# Patient Record
Sex: MALE | Race: WHITE | Employment: FULL TIME | URBAN - METROPOLITAN AREA
[De-identification: names, ages, dates, MRNs, and addresses within clinical notes are randomized per-mention and may not be internally consistent; named-entity substitution may affect disease eponyms.]

---

## 2017-05-05 ENCOUNTER — EMPLOYEE WELLNESS (OUTPATIENT)
Dept: OTHER | Age: 47
End: 2017-05-05

## 2017-05-05 LAB
CHOLESTEROL, TOTAL: 152 MG/DL (ref 0–199)
GLUCOSE BLD-MCNC: 94 MG/DL (ref 74–109)
HDLC SERPL-MCNC: 37 MG/DL (ref 40–59)
LDL CHOLESTEROL CALCULATED: 86 MG/DL (ref 0–129)
TRIGL SERPL-MCNC: 144 MG/DL (ref 0–200)

## 2017-10-16 RX ORDER — LEVOTHYROXINE SODIUM 0.15 MG/1
150 TABLET ORAL DAILY
Qty: 30 TABLET | Refills: 1 | Status: SHIPPED | OUTPATIENT
Start: 2017-10-16 | End: 2017-12-18 | Stop reason: SDUPTHER

## 2017-10-16 NOTE — TELEPHONE ENCOUNTER
Pt is calling because he needs to request a rx refill for his Levothyroxine 150 mcg (pending).  He isn't due for his physical until December but in the meantime, needs his Levothyroxine filled    Preferred pharmacy, [facility]

## 2017-12-18 NOTE — TELEPHONE ENCOUNTER
From: Ted Golden  Sent: 12/18/2017 11:53 AM EST  Subject: Medication Renewal Request    Ted Golden would like a refill of the following medications:  levothyroxine (LEVOTHROID) 150 MCG tablet Alice Tavarez MD]    Preferred pharmacy: [address] [place] [address] [phone] - F [phone]    Comment:  I need a refill until I can schedule my physical later this month.

## 2017-12-20 ENCOUNTER — OFFICE VISIT (OUTPATIENT)
Dept: FAMILY MEDICINE CLINIC | Age: 47
End: 2017-12-20

## 2017-12-20 VITALS
DIASTOLIC BLOOD PRESSURE: 82 MMHG | SYSTOLIC BLOOD PRESSURE: 124 MMHG | TEMPERATURE: 97.3 F | WEIGHT: 195 LBS | HEIGHT: 73 IN | HEART RATE: 73 BPM | BODY MASS INDEX: 25.84 KG/M2 | RESPIRATION RATE: 20 BRPM

## 2017-12-20 DIAGNOSIS — Z00.00 ANNUAL PHYSICAL EXAM: Primary | ICD-10-CM

## 2017-12-20 DIAGNOSIS — Z12.11 COLON CANCER SCREENING: ICD-10-CM

## 2017-12-20 DIAGNOSIS — N52.9 ERECTILE DYSFUNCTION, UNSPECIFIED ERECTILE DYSFUNCTION TYPE: ICD-10-CM

## 2017-12-20 DIAGNOSIS — E03.9 ACQUIRED HYPOTHYROIDISM: ICD-10-CM

## 2017-12-20 DIAGNOSIS — Z23 NEED FOR TDAP VACCINATION: ICD-10-CM

## 2017-12-20 DIAGNOSIS — N40.0 BENIGN NON-NODULAR PROSTATIC HYPERPLASIA WITHOUT LOWER URINARY TRACT SYMPTOMS: ICD-10-CM

## 2017-12-20 DIAGNOSIS — R10.12 ABDOMINAL PAIN, LEFT UPPER QUADRANT: ICD-10-CM

## 2017-12-20 DIAGNOSIS — Z00.00 ANNUAL PHYSICAL EXAM: ICD-10-CM

## 2017-12-20 DIAGNOSIS — R53.81 MALAISE AND FATIGUE: ICD-10-CM

## 2017-12-20 DIAGNOSIS — R53.83 MALAISE AND FATIGUE: ICD-10-CM

## 2017-12-20 LAB
ALBUMIN SERPL-MCNC: 4.5 G/DL (ref 3.9–4.9)
ALP BLD-CCNC: 55 U/L (ref 35–104)
ALT SERPL-CCNC: 19 U/L (ref 0–41)
ANION GAP SERPL CALCULATED.3IONS-SCNC: 16 MEQ/L (ref 7–13)
AST SERPL-CCNC: 21 U/L (ref 0–40)
BASOPHILS ABSOLUTE: 0 K/UL (ref 0–0.2)
BASOPHILS RELATIVE PERCENT: 0.5 %
BILIRUB SERPL-MCNC: 0.4 MG/DL (ref 0–1.2)
BUN BLDV-MCNC: 13 MG/DL (ref 6–20)
CALCIUM SERPL-MCNC: 9.2 MG/DL (ref 8.6–10.2)
CHLORIDE BLD-SCNC: 105 MEQ/L (ref 98–107)
CHOLESTEROL, TOTAL: 156 MG/DL (ref 0–199)
CO2: 25 MEQ/L (ref 22–29)
CREAT SERPL-MCNC: 0.94 MG/DL (ref 0.7–1.2)
EOSINOPHILS ABSOLUTE: 0.2 K/UL (ref 0–0.7)
EOSINOPHILS RELATIVE PERCENT: 3.3 %
GFR AFRICAN AMERICAN: >60
GFR NON-AFRICAN AMERICAN: >60
GLOBULIN: 2.5 G/DL (ref 2.3–3.5)
GLUCOSE BLD-MCNC: 92 MG/DL (ref 74–109)
HCT VFR BLD CALC: 42.9 % (ref 42–52)
HDLC SERPL-MCNC: 40 MG/DL (ref 40–59)
HEMOGLOBIN: 14.6 G/DL (ref 14–18)
LDL CHOLESTEROL CALCULATED: 90 MG/DL (ref 0–129)
LYMPHOCYTES ABSOLUTE: 1.2 K/UL (ref 1–4.8)
LYMPHOCYTES RELATIVE PERCENT: 19.8 %
MCH RBC QN AUTO: 31.4 PG (ref 27–31.3)
MCHC RBC AUTO-ENTMCNC: 34 % (ref 33–37)
MCV RBC AUTO: 92.3 FL (ref 80–100)
MONOCYTES ABSOLUTE: 0.6 K/UL (ref 0.2–0.8)
MONOCYTES RELATIVE PERCENT: 9.9 %
NEUTROPHILS ABSOLUTE: 3.9 K/UL (ref 1.4–6.5)
NEUTROPHILS RELATIVE PERCENT: 66.5 %
PDW BLD-RTO: 14.3 % (ref 11.5–14.5)
PLATELET # BLD: 214 K/UL (ref 130–400)
POTASSIUM SERPL-SCNC: 4.3 MEQ/L (ref 3.5–5.1)
RBC # BLD: 4.65 M/UL (ref 4.7–6.1)
SODIUM BLD-SCNC: 146 MEQ/L (ref 132–144)
T4 FREE: 1.19 NG/DL (ref 0.93–1.7)
TOTAL PROTEIN: 7 G/DL (ref 6.4–8.1)
TRIGL SERPL-MCNC: 132 MG/DL (ref 0–200)
WBC # BLD: 5.9 K/UL (ref 4.8–10.8)

## 2017-12-20 PROCEDURE — 90715 TDAP VACCINE 7 YRS/> IM: CPT | Performed by: FAMILY MEDICINE

## 2017-12-20 PROCEDURE — 90471 IMMUNIZATION ADMIN: CPT | Performed by: FAMILY MEDICINE

## 2017-12-20 PROCEDURE — 99396 PREV VISIT EST AGE 40-64: CPT | Performed by: FAMILY MEDICINE

## 2017-12-20 ASSESSMENT — ENCOUNTER SYMPTOMS
SINUS PRESSURE: 0
EYE ITCHING: 0
CONSTIPATION: 0
EYE DISCHARGE: 0
DIARRHEA: 0
SORE THROAT: 0
ABDOMINAL PAIN: 0
COUGH: 0
SHORTNESS OF BREATH: 0

## 2017-12-20 ASSESSMENT — PATIENT HEALTH QUESTIONNAIRE - PHQ9
SUM OF ALL RESPONSES TO PHQ9 QUESTIONS 1 & 2: 0
2. FEELING DOWN, DEPRESSED OR HOPELESS: 0
SUM OF ALL RESPONSES TO PHQ QUESTIONS 1-9: 0
1. LITTLE INTEREST OR PLEASURE IN DOING THINGS: 0

## 2017-12-20 NOTE — PROGRESS NOTES
Subjective  Manuel Dust, 50 y.o. male presents today with:  Chief Complaint   Patient presents with    Annual Exam           HPI    Patient in for annual exam.  So complaints of the left lower quadrant pain. No other questions and or concerns for today's visit      Review of Systems   Constitutional: Negative for appetite change, fatigue and fever. HENT: Negative for congestion, ear pain, sinus pressure and sore throat. Eyes: Negative for discharge and itching. Respiratory: Negative for cough and shortness of breath. Cardiovascular: Negative for chest pain and palpitations. Gastrointestinal: Negative for abdominal pain, constipation and diarrhea. Endocrine: Negative for polydipsia. Genitourinary: Negative for difficulty urinating. Musculoskeletal: Negative for gait problem. Skin: Negative. Neurological: Negative for dizziness. Hematological: Negative. Psychiatric/Behavioral: Negative.           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     Social History Main Topics    Smoking status: Former Smoker     Packs/day: 0.30     Years: 5.00     Types: Cigarettes     Quit date: 1/1/2003    Smokeless tobacco: Never Used    Alcohol use No    Drug use: No    Sexual activity: Yes     Partners: Female     Other Topics Concern    Not on file     Social History Narrative    No narrative on file     Family History   Problem Relation Age of Onset    Diabetes Mother     Cancer Mother       cervical cancer    Anemia Mother     High Blood Pressure Mother     High Blood Pressure Father     Heart Disease Mother     Kidney Disease Mother     Elevated Lipids Mother     Arthritis Mother     Asthma Mother     Diabetes Father     Liver Disease Father      Cirrhosis/ Hep C    Mental Retardation Maternal Uncle      Allergies   Allergen Reactions    Codeine Nausea And Vomiting     Current Outpatient Prescriptions   Medication Sig Dispense Refill    levothyroxine (LEVOTHROID) 150 MCG tablet Take 1 tablet by mouth daily 30 tablet 1    acetaminophen (TYLENOL) 325 MG tablet Take 325 mg by mouth every 6 hours as needed. OTC          No current facility-administered medications for this visit. PMH, Surgical Hx, Family Hx, and Social Hx reviewed and updated. Health Maintenance reviewed. Objective    Vitals:    12/20/17 0840   BP: 124/82   Pulse: 73   Resp: 20   Temp: 97.3 °F (36.3 °C)   TempSrc: Temporal   Weight: 195 lb (88.5 kg)   Height: 6' 1\" (1.854 m)       Physical Exam   Constitutional: He is oriented to person, place, and time. He appears well-developed and well-nourished. HENT:   Head: Normocephalic and atraumatic. Right Ear: External ear normal.   Left Ear: External ear normal.   Mouth/Throat: Oropharynx is clear and moist.   Eyes: Conjunctivae and EOM are normal. Pupils are equal, round, and reactive to light. Neck: Normal range of motion. Neck supple. No JVD present. No thyromegaly present. Cardiovascular: Normal rate, regular rhythm, normal heart sounds and intact distal pulses. Exam reveals no gallop and no friction rub. No murmur heard. Pulmonary/Chest: Effort normal and breath sounds normal. No respiratory distress. Abdominal: Bowel sounds are normal. He exhibits no mass. There is no tenderness. Musculoskeletal: Normal range of motion. Neurological: He is alert and oriented to person, place, and time. Skin: Skin is warm and dry. Psychiatric: He has a normal mood and affect.  His behavior is normal.     History infrarenal exam left lower quadrant pain that comes and goes times it's extremely painful. No back pain he has not had a colonoscopy at recommend that we do that CAT scan was normal dietary changes have not really made a difference will refer him to Dr. Norm Carias. HEENT exam is benign no thyromegaly carotid bruits she is in some difficulty was up close reading recommend eye exam May need bifocals. Neck is supple lungs are clear cardiaccracklesorretractions. Cardiacexamregularrateandrhythm. Abdomenissoftissomemilddiscomfortleftlowerquadrantondeeppalpationnopalpablemass. UKXigtgqecuu[id number]v.Neurologicallyhe'sintactwithoutfocaldeficitsnoanxietyordepression. Assessment & Plan   1. Annual physical exam  CBC Auto Differential    Comprehensive Metabolic Panel    Lipid Panel   2. Colon cancer screening  Ambulatory referral to Gastroenterology   3. Benign non-nodular prostatic hyperplasia without lower urinary tract symptoms     4. Acquired hypothyroidism  T4, Free   5. Need for Tdap vaccination  Tdap (age 10y-63y) IM (Adacel)   10. Erectile dysfunction, unspecified erectile dysfunction type     7. Abdominal pain, left upper quadrant     8.  Malaise and fatigue  Testosterone male     Orders Placed This Encounter   Procedures    Tdap (age 10y-63y) IM (Adacel)    CBC Auto Differential     Standing Status:   Future     Number of Occurrences:   1     Standing Expiration Date:   12/20/2018    Comprehensive Metabolic Panel     Standing Status:   Future     Number of Occurrences:   1     Standing Expiration Date:   12/20/2018    T4, Free     Standing Status:   Future     Number of Occurrences:   1     Standing Expiration Date:   12/20/2018    Lipid Panel     Standing Status:   Future     Number of Occurrences:   1     Standing Expiration Date:   12/20/2018     Order Specific Question:   Is Patient Fasting?/# of Hours     Answer:   n/a   [facility] Testosterone male

## 2017-12-22 LAB — TESTOSTERONE TOTAL-MALE: 598 NG/DL (ref 300–890)

## 2017-12-27 RX ORDER — LEVOTHYROXINE SODIUM 0.15 MG/1
150 TABLET ORAL DAILY
Qty: 30 TABLET | Refills: 1 | Status: SHIPPED | OUTPATIENT
Start: 2017-12-27 | End: 2018-01-04 | Stop reason: SDUPTHER

## 2018-01-04 DIAGNOSIS — E03.9 ACQUIRED HYPOTHYROIDISM: Primary | ICD-10-CM

## 2018-01-04 RX ORDER — LEVOTHYROXINE SODIUM 0.15 MG/1
150 TABLET ORAL DAILY
Qty: 30 TABLET | Refills: 1 | Status: CANCELLED | OUTPATIENT
Start: 2018-01-04

## 2018-01-04 RX ORDER — LEVOTHYROXINE SODIUM 0.15 MG/1
150 TABLET ORAL DAILY
Qty: 90 TABLET | Refills: 2 | Status: SHIPPED | OUTPATIENT
Start: 2018-01-04 | End: 2018-09-09 | Stop reason: SDUPTHER

## 2018-01-04 NOTE — TELEPHONE ENCOUNTER
PATIENT IS COMPLETELY OUT OF HIS MEDICATION. THIS WAS SENT TO [facility] INSTEAD OF HIS [facility]. CAN YOU PLEASE SEND TO HIS MAIL AWAY.

## 2018-02-09 ENCOUNTER — OFFICE VISIT (OUTPATIENT)
Dept: FAMILY MEDICINE CLINIC | Age: 48
End: 2018-02-09
Payer: COMMERCIAL

## 2018-02-09 VITALS
DIASTOLIC BLOOD PRESSURE: 78 MMHG | SYSTOLIC BLOOD PRESSURE: 122 MMHG | BODY MASS INDEX: 24.94 KG/M2 | OXYGEN SATURATION: 98 % | WEIGHT: 189 LBS | TEMPERATURE: 98.2 F | HEART RATE: 75 BPM

## 2018-02-09 DIAGNOSIS — J01.00 ACUTE MAXILLARY SINUSITIS, RECURRENCE NOT SPECIFIED: Primary | ICD-10-CM

## 2018-02-09 PROCEDURE — 99213 OFFICE O/P EST LOW 20 MIN: CPT | Performed by: NURSE PRACTITIONER

## 2018-02-09 RX ORDER — AMOXICILLIN AND CLAVULANATE POTASSIUM 875; 125 MG/1; MG/1
1 TABLET, FILM COATED ORAL 2 TIMES DAILY
Qty: 14 TABLET | Refills: 0 | Status: SHIPPED | OUTPATIENT
Start: 2018-02-09 | End: 2018-02-16

## 2018-02-09 ASSESSMENT — ENCOUNTER SYMPTOMS
CONSTIPATION: 0
VOMITING: 0
SINUS PRESSURE: 1
EYE DISCHARGE: 0
RHINORRHEA: 1
DIARRHEA: 0
SINUS PAIN: 1
SINUS COMPLAINT: 1
NAUSEA: 0
SHORTNESS OF BREATH: 0
COUGH: 1
SORE THROAT: 1

## 2018-02-09 NOTE — PROGRESS NOTES
two hours in between. Rest  Cool mist humidifier  Increase fluids especially water. Warm salt water gargles: 1 tsp salt to 1 cup warm water   Warm or cool beverages/soups to help soothe throat: brothy soups, warm decaffeinated tea with honey, popsicles, sherbet  Tylenol or Ibuprofen for pain/fever  Saline nasal spray for rinses may use up to 3 x per day  Gently expel nasal secretions frequently  Take Mucinex plain with plenty of water. Cough up and expel mucus. Frequent hand washing    Return in about 2 weeks (around 2/23/2018) for follow up with PCP. Reviewed with the patient: current clinical status, medications, activities and diet. Side effects, adverse effects of the medication prescribed today, as well as treatment plan/ rationale and result expectations have been discussed with the patient who expresses understanding and desires to proceed. Close follow up to evaluate treatment results and for coordination of care. I have reviewed the patient's medical history in detail and updated the computerized patient record.     Dwayne Arellano, CNP

## 2018-02-09 NOTE — PATIENT INSTRUCTIONS
Take antibiotic as ordered  May take probiotic or eat yogurt while on antibiotic and for 3 days after completion of antibiotic. Do not take antibiotic and probiotic (or yogurt) together wait at least two hours in between. Rest  Cool mist humidifier  Increase fluids especially water. Warm salt water gargles: 1 tsp salt to 1 cup warm water   Warm or cool beverages/soups to help soothe throat: brothy soups, warm decaffeinated tea with honey, popsicles, sherbet  Tylenol or Ibuprofen for pain/fever  Saline nasal spray for rinses may use up to 3 x per day  Gently expel nasal secretions frequently  Take Mucinex plain with plenty of water. Cough up and expel mucus. Frequent hand washing      Patient Education        Sinusitis: Care Instructions  Your Care Instructions    Sinusitis is an infection of the lining of the sinus cavities in your head. Sinusitis often follows a cold. It causes pain and pressure in your head and face. In most cases, sinusitis gets better on its own in 1 to 2 weeks. But some mild symptoms may last for several weeks. Sometimes antibiotics are needed. Follow-up care is a key part of your treatment and safety. Be sure to make and go to all appointments, and call your doctor if you are having problems. It's also a good idea to know your test results and keep a list of the medicines you take. How can you care for yourself at home? · Take an over-the-counter pain medicine, such as acetaminophen (Tylenol), ibuprofen (Advil, Motrin), or naproxen (Aleve). Read and follow all instructions on the label. · If the doctor prescribed antibiotics, take them as directed. Do not stop taking them just because you feel better. You need to take the full course of antibiotics. · Be careful when taking over-the-counter cold or flu medicines and Tylenol at the same time. Many of these medicines have acetaminophen, which is Tylenol.  Read the labels to make sure that you are not taking more than the recommended

## 2018-02-11 ASSESSMENT — ENCOUNTER SYMPTOMS: WHEEZING: 0

## 2018-03-20 VITALS — WEIGHT: 203 LBS | BODY MASS INDEX: 27.53 KG/M2

## 2018-06-20 ENCOUNTER — OUTSIDE SERVICES (OUTPATIENT)
Dept: GASTROENTEROLOGY | Age: 48
End: 2018-06-20
Payer: COMMERCIAL

## 2018-06-20 DIAGNOSIS — Z12.11 ENCOUNTER FOR SCREENING COLONOSCOPY: Primary | ICD-10-CM

## 2018-06-20 PROCEDURE — 45378 DIAGNOSTIC COLONOSCOPY: CPT | Performed by: INTERNAL MEDICINE

## 2018-09-10 RX ORDER — LEVOTHYROXINE SODIUM 150 MCG
150 TABLET ORAL DAILY
Qty: 90 TABLET | Refills: 0 | Status: SHIPPED | OUTPATIENT
Start: 2018-09-10 | End: 2019-03-29

## 2019-03-29 ENCOUNTER — OFFICE VISIT (OUTPATIENT)
Dept: FAMILY MEDICINE CLINIC | Age: 49
End: 2019-03-29
Payer: COMMERCIAL

## 2019-03-29 VITALS
DIASTOLIC BLOOD PRESSURE: 78 MMHG | HEART RATE: 70 BPM | RESPIRATION RATE: 12 BRPM | TEMPERATURE: 97.7 F | SYSTOLIC BLOOD PRESSURE: 122 MMHG | OXYGEN SATURATION: 98 %

## 2019-03-29 DIAGNOSIS — E03.9 ACQUIRED HYPOTHYROIDISM: Primary | Chronic | ICD-10-CM

## 2019-03-29 DIAGNOSIS — Z13.220 SCREENING CHOLESTEROL LEVEL: ICD-10-CM

## 2019-03-29 DIAGNOSIS — N40.0 BENIGN NON-NODULAR PROSTATIC HYPERPLASIA WITHOUT LOWER URINARY TRACT SYMPTOMS: Chronic | ICD-10-CM

## 2019-03-29 DIAGNOSIS — Z00.00 WELL ADULT EXAM: ICD-10-CM

## 2019-03-29 PROBLEM — M54.50 CHRONIC BILATERAL LOW BACK PAIN: Chronic | Status: ACTIVE | Noted: 2019-03-29

## 2019-03-29 PROBLEM — G89.29 CHRONIC BILATERAL LOW BACK PAIN: Chronic | Status: ACTIVE | Noted: 2019-03-29

## 2019-03-29 PROCEDURE — 99203 OFFICE O/P NEW LOW 30 MIN: CPT | Performed by: FAMILY MEDICINE

## 2019-03-29 RX ORDER — LEVOTHYROXINE SODIUM 137 UG/1
137 TABLET ORAL DAILY
Qty: 14 TABLET | Refills: 0 | Status: SHIPPED | OUTPATIENT
Start: 2019-03-29 | End: 2019-04-01 | Stop reason: SDUPTHER

## 2019-03-29 RX ORDER — LEVOTHYROXINE SODIUM 0.15 MG/1
150 TABLET ORAL DAILY
Qty: 90 TABLET | Refills: 3 | Status: CANCELLED | OUTPATIENT
Start: 2019-03-29

## 2019-03-29 RX ORDER — LEVOTHYROXINE SODIUM 0.15 MG/1
150 TABLET ORAL DAILY
Qty: 30 TABLET | Refills: 0 | Status: CANCELLED | OUTPATIENT
Start: 2019-03-29

## 2019-03-29 SDOH — HEALTH STABILITY: MENTAL HEALTH: HOW OFTEN DO YOU HAVE A DRINK CONTAINING ALCOHOL?: MONTHLY OR LESS

## 2019-03-29 SDOH — HEALTH STABILITY: MENTAL HEALTH: HOW MANY STANDARD DRINKS CONTAINING ALCOHOL DO YOU HAVE ON A TYPICAL DAY?: 1 OR 2

## 2019-03-29 ASSESSMENT — ENCOUNTER SYMPTOMS
SHORTNESS OF BREATH: 0
DIARRHEA: 0
BACK PAIN: 1
VOMITING: 0
CONSTIPATION: 0
COUGH: 0
NAUSEA: 0
ABDOMINAL PAIN: 1
WHEEZING: 0
ANAL BLEEDING: 0
CHEST TIGHTNESS: 0
BLOOD IN STOOL: 0

## 2019-03-29 ASSESSMENT — PATIENT HEALTH QUESTIONNAIRE - PHQ9
SUM OF ALL RESPONSES TO PHQ QUESTIONS 1-9: 0
2. FEELING DOWN, DEPRESSED OR HOPELESS: 0
SUM OF ALL RESPONSES TO PHQ9 QUESTIONS 1 & 2: 0
1. LITTLE INTEREST OR PLEASURE IN DOING THINGS: 0
SUM OF ALL RESPONSES TO PHQ QUESTIONS 1-9: 0

## 2019-04-01 ENCOUNTER — HOSPITAL ENCOUNTER (OUTPATIENT)
Dept: LAB | Age: 49
Discharge: HOME | End: 2019-04-01
Payer: COMMERCIAL

## 2019-04-01 DIAGNOSIS — Z13.220 SCREENING CHOLESTEROL LEVEL: ICD-10-CM

## 2019-04-01 DIAGNOSIS — E03.9 ACQUIRED HYPOTHYROIDISM: Chronic | ICD-10-CM

## 2019-04-01 DIAGNOSIS — Z00.00 WELL ADULT EXAM: ICD-10-CM

## 2019-04-01 LAB
ALBUMIN SERPL-MCNC: 4.5 G/DL (ref 3.5–4.6)
ALP BLD-CCNC: 54 U/L (ref 35–104)
ALT SERPL-CCNC: 15 U/L (ref 0–41)
ANION GAP SERPL CALCULATED.3IONS-SCNC: 13 MEQ/L (ref 9–15)
AST SERPL-CCNC: 18 U/L (ref 0–40)
BASOPHILS ABSOLUTE: 0 K/UL (ref 0–0.2)
BASOPHILS RELATIVE PERCENT: 0.7 %
BILIRUB SERPL-MCNC: 0.5 MG/DL (ref 0.2–0.7)
BUN BLDV-MCNC: 15 MG/DL (ref 6–20)
CALCIUM SERPL-MCNC: 9.1 MG/DL (ref 8.5–9.9)
CHLORIDE BLD-SCNC: 107 MEQ/L (ref 95–107)
CHOLESTEROL, TOTAL: 143 MG/DL (ref 0–199)
CO2: 25 MEQ/L (ref 20–31)
CREAT SERPL-MCNC: 0.94 MG/DL (ref 0.7–1.2)
EOSINOPHILS ABSOLUTE: 0.2 K/UL (ref 0–0.7)
EOSINOPHILS RELATIVE PERCENT: 3.3 %
GFR AFRICAN AMERICAN: >60
GFR NON-AFRICAN AMERICAN: >60
GLOBULIN: 2.8 G/DL (ref 2.3–3.5)
GLUCOSE BLD-MCNC: 95 MG/DL (ref 70–99)
HCT VFR BLD CALC: 41.5 % (ref 42–52)
HDLC SERPL-MCNC: 35 MG/DL (ref 40–59)
HEMOGLOBIN: 14.4 G/DL (ref 14–18)
LDL CHOLESTEROL CALCULATED: 87 MG/DL (ref 0–129)
LYMPHOCYTES ABSOLUTE: 1.2 K/UL (ref 1–4.8)
LYMPHOCYTES RELATIVE PERCENT: 22.8 %
MCH RBC QN AUTO: 31.1 PG (ref 27–31.3)
MCHC RBC AUTO-ENTMCNC: 34.7 % (ref 33–37)
MCV RBC AUTO: 89.7 FL (ref 80–100)
MONOCYTES ABSOLUTE: 0.6 K/UL (ref 0.2–0.8)
MONOCYTES RELATIVE PERCENT: 11.5 %
NEUTROPHILS ABSOLUTE: 3.3 K/UL (ref 1.4–6.5)
NEUTROPHILS RELATIVE PERCENT: 61.7 %
PDW BLD-RTO: 13.7 % (ref 11.5–14.5)
PLATELET # BLD: 219 K/UL (ref 130–400)
POTASSIUM SERPL-SCNC: 4.3 MEQ/L (ref 3.4–4.9)
RBC # BLD: 4.63 M/UL (ref 4.7–6.1)
SODIUM BLD-SCNC: 145 MEQ/L (ref 135–144)
T4 FREE: 1.43 NG/DL (ref 0.84–1.68)
TOTAL PROTEIN: 7.3 G/DL (ref 6.3–8)
TRIGL SERPL-MCNC: 107 MG/DL (ref 0–150)
TSH SERPL DL<=0.05 MIU/L-ACNC: 1.45 UIU/ML (ref 0.44–3.86)
WBC # BLD: 5.3 K/UL (ref 4.8–10.8)

## 2019-04-01 PROCEDURE — 80053 COMPREHEN METABOLIC PANEL: CPT

## 2019-04-01 PROCEDURE — 80061 LIPID PANEL: CPT

## 2019-04-01 PROCEDURE — 84439 ASSAY OF FREE THYROXINE: CPT

## 2019-04-01 PROCEDURE — 85025 COMPLETE CBC W/AUTO DIFF WBC: CPT

## 2019-04-01 PROCEDURE — 84443 ASSAY THYROID STIM HORMONE: CPT

## 2019-04-01 PROCEDURE — 36415 COLL VENOUS BLD VENIPUNCTURE: CPT

## 2019-04-01 RX ORDER — LEVOTHYROXINE SODIUM 137 UG/1
137 TABLET ORAL DAILY
Qty: 90 TABLET | Refills: 3 | Status: SHIPPED | OUTPATIENT
Start: 2019-04-01 | End: 2020-04-08 | Stop reason: SDUPTHER

## 2019-05-10 ENCOUNTER — OFFICE VISIT (OUTPATIENT)
Dept: FAMILY MEDICINE CLINIC | Age: 49
End: 2019-05-10
Payer: COMMERCIAL

## 2019-05-10 VITALS
TEMPERATURE: 97.1 F | WEIGHT: 192.4 LBS | HEIGHT: 71 IN | RESPIRATION RATE: 14 BRPM | HEART RATE: 64 BPM | SYSTOLIC BLOOD PRESSURE: 120 MMHG | BODY MASS INDEX: 26.94 KG/M2 | DIASTOLIC BLOOD PRESSURE: 70 MMHG

## 2019-05-10 DIAGNOSIS — Z00.00 WELL ADULT EXAM: Primary | ICD-10-CM

## 2019-05-10 DIAGNOSIS — Z23 NEED FOR VACCINATION: ICD-10-CM

## 2019-05-10 PROCEDURE — 99396 PREV VISIT EST AGE 40-64: CPT | Performed by: FAMILY MEDICINE

## 2019-05-10 ASSESSMENT — ENCOUNTER SYMPTOMS
VOMITING: 0
CHEST TIGHTNESS: 0
SORE THROAT: 0
PHOTOPHOBIA: 0
SHORTNESS OF BREATH: 0
ABDOMINAL DISTENTION: 0
SINUS PRESSURE: 0
ABDOMINAL PAIN: 0
APNEA: 0
COUGH: 0
BLOOD IN STOOL: 0
CONSTIPATION: 0
DIARRHEA: 0
WHEEZING: 0
NAUSEA: 0
CHOKING: 0
ANAL BLEEDING: 0
EYE DISCHARGE: 0
RHINORRHEA: 0
COLOR CHANGE: 0
EYE PAIN: 0

## 2019-05-10 NOTE — PROGRESS NOTES
Subjective:      Patient ID: Iris Doan is a 44 y.o. male who presents today for:     Chief Complaint   Patient presents with    Annual Exam       HPI     Patient presents for routine well visit.   He denies any acute concerns or complaints    Past Medical History:   Diagnosis Date    Acquired hypothyroidism 12/20/2017    Anxiety 2008    Benign non-nodular prostatic hyperplasia without lower urinary tract symptoms 3/3/2016    Chronic bilateral low back pain 3/29/2019    Leukopenia 7/24/2012     Past Surgical History:   Procedure Laterality Date    CHOLECYSTECTOMY, LAPAROSCOPIC  2010    COLONOSCOPY  06/20/2018    int hemorr, 10y repeat (DR SCHERER)    UPPER GASTROINTESTINAL ENDOSCOPY  2009    gastritis    VASECTOMY  2007     Family History   Problem Relation Age of Onset    Diabetes Mother     Anemia Mother     High Blood Pressure Mother     Kidney Disease Mother     Arthritis Mother     Asthma Mother     Cervical Cancer Mother 43    Heart Failure Mother     High Cholesterol Mother     High Blood Pressure Father     Diabetes Father     Cirrhosis Father         Hep C    Mental Retardation Maternal Uncle     Diabetes type 2  Brother     No Known Problems Maternal Grandmother     No Known Problems Maternal Grandfather     No Known Problems Paternal Grandmother     No Known Problems Paternal Grandfather     No Known Problems Brother     No Known Problems Daughter     No Known Problems Son      Social History     Socioeconomic History    Marital status:      Spouse name: Adele    Number of children: 2    Years of education: Not on file    Highest education level: Not on file   Occupational History    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   Social Needs    Financial resource strain: Not on file    Food insecurity:     Worry: Not on file     Inability: Not on file    Transportation needs:     Medical: Not on file Non-medical: Not on file   Tobacco Use    Smoking status: Former Smoker     Packs/day: 0.30     Years: 5.00     Pack years: 1.50     Types: Cigarettes     Start date:      Last attempt to quit: 2003     Years since quittin.3    Smokeless tobacco: Never Used   Substance and Sexual Activity    Alcohol use: Yes     Frequency: Monthly or less     Drinks per session: 1 or 2     Binge frequency: Never    Drug use: No    Sexual activity: Yes     Partners: Female     Comment: monogamous   Lifestyle    Physical activity:     Days per week: Not on file     Minutes per session: Not on file    Stress: Not on file   Relationships    Social connections:     Talks on phone: Not on file     Gets together: Not on file     Attends Mandaeism service: Not on file     Active member of club or organization: Not on file     Attends meetings of clubs or organizations: Not on file     Relationship status: Not on file    Intimate partner violence:     Fear of current or ex partner: Not on file     Emotionally abused: Not on file     Physically abused: Not on file     Forced sexual activity: Not on file   Other Topics Concern    Not on file   Social History Narrative    Lives with wife and children        1 dog        Hobbies: reading, outdoor work     Current Outpatient Medications on File Prior to Visit   Medication Sig Dispense Refill    levothyroxine (SYNTHROID) 137 MCG tablet Take 1 tablet by mouth daily 90 tablet 3    acetaminophen (TYLENOL) 325 MG tablet Take 325 mg by mouth every 6 hours as needed. OTC          No current facility-administered medications on file prior to visit. Allergies:  Codeine    Review of Systems   Constitutional: Negative for appetite change, chills, diaphoresis, fatigue, fever and unexpected weight change. HENT: Negative for congestion, ear pain, postnasal drip, rhinorrhea, sinus pressure and sore throat.     Eyes: Negative for photophobia, pain, discharge and visual disturbance. Respiratory: Negative for apnea, cough, choking, chest tightness, shortness of breath and wheezing. Cardiovascular: Negative for chest pain, palpitations and leg swelling. No orthopnea, No PND   Gastrointestinal: Negative for abdominal distention, abdominal pain, anal bleeding, blood in stool, constipation, diarrhea, nausea and vomiting. No heartburn, No melena   Endocrine: Negative for cold intolerance, heat intolerance, polydipsia, polyphagia and polyuria. Genitourinary: Negative for dysuria, flank pain, frequency, hematuria and urgency. Musculoskeletal: Negative for gait problem and myalgias. Skin: Negative for color change and rash. Neurological: Negative for dizziness, tremors, syncope, weakness, light-headedness, numbness and headaches. Hematological: Negative for adenopathy. Psychiatric/Behavioral: Negative for dysphoric mood and sleep disturbance. The patient is not nervous/anxious. Objective:     /70 (Site: Left Upper Arm, Position: Sitting, Cuff Size: Small Adult)   Pulse 64   Temp 97.1 °F (36.2 °C) (Temporal)   Resp 14   Ht 5' 11.25\" (1.81 m) Comment: checked at OV today  Wt 192 lb 6.4 oz (87.3 kg)   BMI 26.65 kg/m²     Physical Exam   Constitutional: He is oriented to person, place, and time. He appears well-developed and well-nourished. HENT:   Head: Normocephalic and atraumatic. Right Ear: Tympanic membrane, external ear and ear canal normal.   Left Ear: Tympanic membrane, external ear and ear canal normal.   Nose: Nose normal.   Mouth/Throat: Oropharynx is clear and moist and mucous membranes are normal. No oropharyngeal exudate. Eyes: Pupils are equal, round, and reactive to light. Conjunctivae and EOM are normal. Right eye exhibits no discharge. Left eye exhibits no discharge. Neck: Normal range of motion. Neck supple. Carotid bruit is not present. No thyromegaly present.    Cardiovascular: Normal rate, regular rhythm, S1 normal, S2 normal, normal heart sounds and intact distal pulses. Exam reveals no gallop and no friction rub. No murmur heard. Pulmonary/Chest: Effort normal and breath sounds normal. No accessory muscle usage. No respiratory distress. He has no wheezes. He has no rhonchi. He has no rales. He exhibits no tenderness. Abdominal: Soft. Bowel sounds are normal. He exhibits no distension and no mass. There is no tenderness. There is no rebound and no guarding. Musculoskeletal: Normal range of motion. He exhibits no edema, tenderness or deformity. Lymphadenopathy:     He has no cervical adenopathy. Right: No supraclavicular adenopathy present. Left: No supraclavicular adenopathy present. Neurological: He is alert and oriented to person, place, and time. He has normal strength. No cranial nerve deficit or sensory deficit. He exhibits normal muscle tone. Skin: Skin is warm. No rash noted. He is not diaphoretic. No cyanosis. Nails show no clubbing. Psychiatric: He has a normal mood and affect. His behavior is normal.        Ortho Exam (If Applicable)      Assessment & Plan:      1. Well adult exam  49-year-old male with exam as listed above    2. Need for vaccination    - zoster recombinant adjuvanted vaccine [facility]) 50 MCG/0.5ML SUSR injection; Inject 0.5 mLs into the muscle once for 1 dose - dose #2 indicated 2-6 months after dose #1  Dispense: 0.5 mL; Refill: 1      Modified Medications    No medications on file          New Prescriptions    ZOSTER RECOMBINANT ADJUVANTED VACCINE (SHINGRIX) 50 MCG/0.5ML SUSR INJECTION    Inject 0.5 mLs into the muscle once for 1 dose - dose #2 indicated 2-6 months after dose #1        There are no discontinued medications.     Return in about 1 year (around 5/10/2020) for Annual Melvin Lucero MD

## 2019-10-10 ENCOUNTER — OFFICE VISIT (OUTPATIENT)
Dept: FAMILY MEDICINE CLINIC | Age: 49
End: 2019-10-10
Payer: COMMERCIAL

## 2019-10-10 VITALS
TEMPERATURE: 97.1 F | SYSTOLIC BLOOD PRESSURE: 100 MMHG | HEART RATE: 86 BPM | DIASTOLIC BLOOD PRESSURE: 66 MMHG | RESPIRATION RATE: 18 BRPM | HEIGHT: 70 IN | OXYGEN SATURATION: 99 % | WEIGHT: 198.6 LBS | BODY MASS INDEX: 28.43 KG/M2

## 2019-10-10 DIAGNOSIS — R19.7 DIARRHEA, UNSPECIFIED TYPE: ICD-10-CM

## 2019-10-10 DIAGNOSIS — R19.7 DIARRHEA, UNSPECIFIED TYPE: Primary | ICD-10-CM

## 2019-10-10 PROCEDURE — 99213 OFFICE O/P EST LOW 20 MIN: CPT | Performed by: NURSE PRACTITIONER

## 2019-10-10 RX ORDER — METRONIDAZOLE 500 MG/1
500 TABLET ORAL 3 TIMES DAILY
Qty: 30 TABLET | Refills: 0 | Status: SHIPPED | OUTPATIENT
Start: 2019-10-10 | End: 2019-10-20

## 2019-10-10 ASSESSMENT — ENCOUNTER SYMPTOMS
CONSTIPATION: 0
DIARRHEA: 1
ABDOMINAL PAIN: 1
BELCHING: 0
FLATUS: 0
NAUSEA: 1
VOMITING: 1

## 2019-10-11 LAB
C DIFF TOXIN/ANTIGEN: NORMAL
GI BACTERIAL PATHOGENS BY PCR: NORMAL

## 2019-10-29 ENCOUNTER — TELEPHONE (OUTPATIENT)
Dept: FAMILY MEDICINE CLINIC | Age: 49
End: 2019-10-29

## 2019-10-29 ENCOUNTER — HOSPITAL ENCOUNTER (OUTPATIENT)
Dept: LAB | Age: 49
Discharge: HOME | End: 2019-10-29
Payer: COMMERCIAL

## 2019-10-29 DIAGNOSIS — Z01.84 IMMUNITY STATUS TESTING: ICD-10-CM

## 2019-10-29 PROCEDURE — 86787 VARICELLA-ZOSTER ANTIBODY: CPT

## 2019-11-01 LAB — VZV IGG SER QL IA: 1693 IV

## 2019-11-18 ENCOUNTER — HOSPITAL ENCOUNTER (OUTPATIENT)
Dept: LAB | Age: 49
Discharge: HOME | End: 2019-11-18
Payer: COMMERCIAL

## 2019-11-18 DIAGNOSIS — Z01.84 IMMUNITY STATUS TESTING: ICD-10-CM

## 2019-11-18 LAB — RUBELLA ANTIBODY IGG: 363.5 IU/ML

## 2019-11-18 PROCEDURE — 36415 COLL VENOUS BLD VENIPUNCTURE: CPT

## 2019-11-18 PROCEDURE — 86762 RUBELLA ANTIBODY: CPT

## 2019-11-18 PROCEDURE — 86765 RUBEOLA ANTIBODY: CPT

## 2019-11-18 PROCEDURE — 86735 MUMPS ANTIBODY: CPT

## 2019-11-19 LAB
MUV IGG SER QL: 39.1 AU/ML
RUBEOLA (MEASLES) AB IGG: 116 AU/ML

## 2020-04-08 RX ORDER — LEVOTHYROXINE SODIUM 137 UG/1
137 TABLET ORAL DAILY
Qty: 90 TABLET | Refills: 0 | Status: SHIPPED | OUTPATIENT
Start: 2020-04-08 | End: 2020-07-20 | Stop reason: SDUPTHER

## 2020-07-17 ENCOUNTER — HOSPITAL ENCOUNTER (OUTPATIENT)
Dept: LAB | Age: 50
Discharge: HOME | End: 2020-07-17
Payer: COMMERCIAL

## 2020-07-17 LAB
T4 FREE: 1.57 NG/DL (ref 0.84–1.68)
TSH SERPL DL<=0.05 MIU/L-ACNC: 3.02 UIU/ML (ref 0.44–3.86)

## 2020-07-17 PROCEDURE — 84439 ASSAY OF FREE THYROXINE: CPT

## 2020-07-17 PROCEDURE — 84443 ASSAY THYROID STIM HORMONE: CPT

## 2020-07-17 PROCEDURE — 36415 COLL VENOUS BLD VENIPUNCTURE: CPT

## 2020-07-20 RX ORDER — LEVOTHYROXINE SODIUM 137 UG/1
137 TABLET ORAL DAILY
Qty: 90 TABLET | Refills: 1 | Status: SHIPPED | OUTPATIENT
Start: 2020-07-20 | End: 2021-01-21 | Stop reason: SDUPTHER

## 2020-07-31 ENCOUNTER — EMPLOYEE WELLNESS (OUTPATIENT)
Dept: OTHER | Age: 50
End: 2020-07-31

## 2020-07-31 LAB
CHOLESTEROL, TOTAL: 147 MG/DL (ref 0–199)
GLUCOSE BLD-MCNC: 82 MG/DL (ref 70–99)
HDLC SERPL-MCNC: 34 MG/DL (ref 40–59)
LDL CHOLESTEROL CALCULATED: 83 MG/DL (ref 0–129)
TRIGL SERPL-MCNC: 152 MG/DL (ref 0–150)

## 2020-08-25 ENCOUNTER — OFFICE VISIT (OUTPATIENT)
Dept: FAMILY MEDICINE CLINIC | Age: 50
End: 2020-08-25
Payer: COMMERCIAL

## 2020-08-25 ENCOUNTER — HOSPITAL ENCOUNTER (OUTPATIENT)
Dept: LAB | Age: 50
Discharge: HOME | End: 2020-08-25
Payer: COMMERCIAL

## 2020-08-25 VITALS
OXYGEN SATURATION: 98 % | TEMPERATURE: 98 F | HEART RATE: 78 BPM | DIASTOLIC BLOOD PRESSURE: 84 MMHG | HEIGHT: 70 IN | WEIGHT: 203.4 LBS | SYSTOLIC BLOOD PRESSURE: 132 MMHG | BODY MASS INDEX: 29.12 KG/M2

## 2020-08-25 PROBLEM — E78.1 HYPERTRIGLYCERIDEMIA: Status: ACTIVE | Noted: 2020-08-25

## 2020-08-25 LAB
ALBUMIN SERPL-MCNC: 4.3 G/DL (ref 3.5–4.6)
ALP BLD-CCNC: 61 U/L (ref 35–104)
ALT SERPL-CCNC: 15 U/L (ref 0–41)
ANION GAP SERPL CALCULATED.3IONS-SCNC: 11 MEQ/L (ref 9–15)
AST SERPL-CCNC: 20 U/L (ref 0–40)
BASOPHILS ABSOLUTE: 0 K/UL (ref 0–0.2)
BASOPHILS RELATIVE PERCENT: 0.4 %
BILIRUB SERPL-MCNC: 0.7 MG/DL (ref 0.2–0.7)
BUN BLDV-MCNC: 14 MG/DL (ref 6–20)
CALCIUM SERPL-MCNC: 8.6 MG/DL (ref 8.5–9.9)
CHLORIDE BLD-SCNC: 105 MEQ/L (ref 95–107)
CO2: 26 MEQ/L (ref 20–31)
CREAT SERPL-MCNC: 0.99 MG/DL (ref 0.7–1.2)
EOSINOPHILS ABSOLUTE: 0.3 K/UL (ref 0–0.7)
EOSINOPHILS RELATIVE PERCENT: 4.8 %
GFR AFRICAN AMERICAN: >60
GFR NON-AFRICAN AMERICAN: >60
GLOBULIN: 2.8 G/DL (ref 2.3–3.5)
GLUCOSE BLD-MCNC: 91 MG/DL (ref 70–99)
HCT VFR BLD CALC: 41.8 % (ref 42–52)
HEMOGLOBIN: 14.2 G/DL (ref 14–18)
LYMPHOCYTES ABSOLUTE: 1.1 K/UL (ref 1–4.8)
LYMPHOCYTES RELATIVE PERCENT: 18.4 %
MCH RBC QN AUTO: 31 PG (ref 27–31.3)
MCHC RBC AUTO-ENTMCNC: 34 % (ref 33–37)
MCV RBC AUTO: 91.3 FL (ref 80–100)
MONOCYTES ABSOLUTE: 0.7 K/UL (ref 0.2–0.8)
MONOCYTES RELATIVE PERCENT: 11.6 %
NEUTROPHILS ABSOLUTE: 3.9 K/UL (ref 1.4–6.5)
NEUTROPHILS RELATIVE PERCENT: 64.8 %
PDW BLD-RTO: 13.8 % (ref 11.5–14.5)
PLATELET # BLD: 243 K/UL (ref 130–400)
POTASSIUM SERPL-SCNC: 4.4 MEQ/L (ref 3.4–4.9)
RBC # BLD: 4.58 M/UL (ref 4.7–6.1)
SODIUM BLD-SCNC: 142 MEQ/L (ref 135–144)
TOTAL PROTEIN: 7.1 G/DL (ref 6.3–8)
WBC # BLD: 5.9 K/UL (ref 4.8–10.8)

## 2020-08-25 PROCEDURE — 85025 COMPLETE CBC W/AUTO DIFF WBC: CPT

## 2020-08-25 PROCEDURE — 36415 COLL VENOUS BLD VENIPUNCTURE: CPT

## 2020-08-25 PROCEDURE — 99396 PREV VISIT EST AGE 40-64: CPT | Performed by: FAMILY MEDICINE

## 2020-08-25 PROCEDURE — 80053 COMPREHEN METABOLIC PANEL: CPT

## 2020-08-25 ASSESSMENT — ENCOUNTER SYMPTOMS
ANAL BLEEDING: 0
DIARRHEA: 0
APNEA: 0
CHOKING: 0
COLOR CHANGE: 0
SORE THROAT: 0
ABDOMINAL PAIN: 0
SINUS PRESSURE: 0
RHINORRHEA: 0
EYE DISCHARGE: 0
ABDOMINAL DISTENTION: 0
EYE PAIN: 0
WHEEZING: 0
SHORTNESS OF BREATH: 0
BLOOD IN STOOL: 0
PHOTOPHOBIA: 0
NAUSEA: 0
CONSTIPATION: 0
COUGH: 0
CHEST TIGHTNESS: 0
VOMITING: 0

## 2020-08-25 ASSESSMENT — PATIENT HEALTH QUESTIONNAIRE - PHQ9
1. LITTLE INTEREST OR PLEASURE IN DOING THINGS: 0
2. FEELING DOWN, DEPRESSED OR HOPELESS: 0
SUM OF ALL RESPONSES TO PHQ QUESTIONS 1-9: 0
SUM OF ALL RESPONSES TO PHQ QUESTIONS 1-9: 0
SUM OF ALL RESPONSES TO PHQ9 QUESTIONS 1 & 2: 0

## 2020-08-25 NOTE — PROGRESS NOTES
Subjective:      Patient ID: Elena Colvin is a 44 y.o. male who presents today for:     Chief Complaint   Patient presents with    Annual Exam       HPI     Patient presents for routine well visit    Past Medical History:   Diagnosis Date    Acquired hypothyroidism 12/20/2017    Anxiety 2008    Benign non-nodular prostatic hyperplasia without lower urinary tract symptoms 3/3/2016    Chronic bilateral low back pain 3/29/2019    Hypertriglyceridemia 8/25/2020    Leukopenia 7/24/2012     Past Surgical History:   Procedure Laterality Date    CHOLECYSTECTOMY, LAPAROSCOPIC  2010    COLONOSCOPY  06/20/2018    int hemorr, 10y repeat (DR Philip Arce)    UPPER GASTROINTESTINAL ENDOSCOPY  2009    gastritis    VASECTOMY  2007     Family History   Problem Relation Age of Onset    Diabetes Mother     Anemia Mother     High Blood Pressure Mother     Kidney Disease Mother     Arthritis Mother     Asthma Mother     Cervical Cancer Mother 43    Heart Failure Mother     High Cholesterol Mother     High Blood Pressure Father     Diabetes Father     Cirrhosis Father         Hep C    Mental Retardation Maternal Uncle     Diabetes type 2  Brother     No Known Problems Maternal Grandmother     No Known Problems Maternal Grandfather     No Known Problems Paternal Grandmother     No Known Problems Paternal Grandfather     No Known Problems Brother     No Known Problems Daughter     No Known Problems Son      Social History     Socioeconomic History    Marital status:      Spouse name: Adele    Number of children: 2    Years of education: Not on file    Highest education level: Not on file   Occupational History    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   Social Needs    Financial resource strain: Not on file    Food insecurity     Worry: Not on file     Inability: Not on file    Transportation needs     Medical: Not on file     Non-medical: Not on file   Tobacco Use    Smoking status: Former Smoker     Packs/day: 0.30     Years: 5.00     Pack years: 1.50     Types: Cigarettes     Start date:      Last attempt to quit: 2003     Years since quittin.6    Smokeless tobacco: Never Used   Substance and Sexual Activity    Alcohol use: Yes     Frequency: Monthly or less     Drinks per session: 1 or 2     Binge frequency: Never    Drug use: No    Sexual activity: Yes     Partners: Female     Comment: monogamous   Lifestyle    Physical activity     Days per week: Not on file     Minutes per session: Not on file    Stress: Not on file   Relationships    Social connections     Talks on phone: Not on file     Gets together: Not on file     Attends Catholic service: Not on file     Active member of club or organization: Not on file     Attends meetings of clubs or organizations: Not on file     Relationship status: Not on file    Intimate partner violence     Fear of current or ex partner: Not on file     Emotionally abused: Not on file     Physically abused: Not on file     Forced sexual activity: Not on file   Other Topics Concern    Not on file   Social History Narrative    Lives with wife and children        1 dog        Hobbies: reading, outdoor work     Current Outpatient Medications on File Prior to Visit   Medication Sig Dispense Refill    levothyroxine (SYNTHROID) 137 MCG tablet Take 1 tablet by mouth daily 90 tablet 1    acetaminophen (TYLENOL) 325 MG tablet Take 325 mg by mouth every 6 hours as needed. OTC          No current facility-administered medications on file prior to visit. Allergies:  Codeine    Review of Systems   Constitutional: Negative for appetite change, chills, diaphoresis, fatigue, fever and unexpected weight change. HENT: Negative for congestion, ear pain, postnasal drip, rhinorrhea, sinus pressure and sore throat. Eyes: Negative for photophobia, pain, discharge and visual disturbance.    Respiratory: Negative for apnea, cough, choking, chest tightness, shortness of breath and wheezing. Cardiovascular: Negative for chest pain, palpitations and leg swelling. No orthopnea, No PND   Gastrointestinal: Negative for abdominal distention, abdominal pain, anal bleeding, blood in stool, constipation, diarrhea, nausea and vomiting. No heartburn, No melena   Endocrine: Negative for cold intolerance, heat intolerance, polydipsia, polyphagia and polyuria. Genitourinary: Negative for dysuria, flank pain, frequency, hematuria and urgency. Musculoskeletal: Negative for gait problem and myalgias. Skin: Negative for color change and rash. Neurological: Negative for syncope, weakness, numbness and headaches. Hematological: Negative for adenopathy. Psychiatric/Behavioral: Negative for dysphoric mood and sleep disturbance. The patient is not nervous/anxious. Objective:     /84 (Site: Right Upper Arm, Position: Sitting, Cuff Size: Medium Adult)   Pulse 78   Temp 98 °F (36.7 °C) (Temporal)   Ht 5' 10\" (1.778 m)   Wt 203 lb 6.4 oz (92.3 kg)   SpO2 98%   BMI 29.18 kg/m²     Physical Exam  Vitals signs reviewed. Constitutional:       Appearance: He is well-developed. He is not diaphoretic. HENT:      Head: Normocephalic and atraumatic. Right Ear: Tympanic membrane, ear canal and external ear normal.      Left Ear: Tympanic membrane, ear canal and external ear normal.      Nose: Nose normal.      Mouth/Throat:      Pharynx: No oropharyngeal exudate. Eyes:      General:         Right eye: No discharge. Left eye: No discharge. Conjunctiva/sclera: Conjunctivae normal.      Pupils: Pupils are equal, round, and reactive to light. Neck:      Musculoskeletal: Normal range of motion and neck supple. Thyroid: No thyromegaly. Vascular: No carotid bruit. Cardiovascular:      Rate and Rhythm: Normal rate and regular rhythm.       Heart sounds: Normal heart sounds, S1 MCG/0.5ML SUSR injection REORDER       Return in about 1 year (around 8/25/2021).     Sugey Domingo MD

## 2020-10-19 VITALS — BODY MASS INDEX: 28.55 KG/M2 | WEIGHT: 199 LBS

## 2021-01-13 ENCOUNTER — VIRTUAL VISIT (OUTPATIENT)
Dept: FAMILY MEDICINE CLINIC | Age: 51
End: 2021-01-13
Payer: COMMERCIAL

## 2021-01-13 DIAGNOSIS — J98.8 RESPIRATORY INFECTION: Primary | ICD-10-CM

## 2021-01-13 PROCEDURE — 99213 OFFICE O/P EST LOW 20 MIN: CPT | Performed by: FAMILY MEDICINE

## 2021-01-13 RX ORDER — AZITHROMYCIN 250 MG/1
250 TABLET, FILM COATED ORAL SEE ADMIN INSTRUCTIONS
Qty: 6 TABLET | Refills: 0 | Status: SHIPPED | OUTPATIENT
Start: 2021-01-13 | End: 2021-01-18

## 2021-01-13 RX ORDER — AMOXICILLIN AND CLAVULANATE POTASSIUM 875; 125 MG/1; MG/1
1 TABLET, FILM COATED ORAL 2 TIMES DAILY
Qty: 14 TABLET | Refills: 0 | Status: SHIPPED | OUTPATIENT
Start: 2021-01-13 | End: 2021-01-13

## 2021-01-13 ASSESSMENT — ENCOUNTER SYMPTOMS
SHORTNESS OF BREATH: 0
SORE THROAT: 0
ABDOMINAL PAIN: 0
WHEEZING: 0
COUGH: 1
CHEST TIGHTNESS: 1
DIARRHEA: 0
RHINORRHEA: 0
CONSTIPATION: 0

## 2021-01-13 NOTE — PROGRESS NOTES
2021    TELEHEALTH EVALUATION -- Audio/Visual (During QFVEV-09 public health emergency)    Due to [place] 19 outbreak, patient's office visit was converted to a virtual visit. Patient was contacted and agreed to proceed with a virtual visit via [email or web address] me  The risks and benefits of converting to a virtual visit were discussed in light of the current infectious disease epidemic. Patient also understood that insurance coverage and co-pays are up to their individual insurance plans. Chief Complaint   Patient presents with    Positive For Covid-19     2021, congestion and chest tightness is getting worse. HPI:    Allanrey Ehsan (:  1970) has requested an audio/video evaluation for the following concern(s):        COVID: works in nursing at [employer]; vaccinated for [place]  recently; a few days later having fatigue, sweats, rigors, fever (102), HA, chest congestion, loss of taste/smell. He talked to employee health and tested for COVID as pos  (symptoms on the ). Still getting occassional fevers controlled with tylenol. Feels in some way getting worse with cough and chest congestion. Review of Systems   Constitutional: Positive for chills, diaphoresis, fatigue and fever. HENT: Positive for congestion. Negative for rhinorrhea and sore throat. Respiratory: Positive for cough and chest tightness. Negative for shortness of breath and wheezing. Gastrointestinal: Negative for abdominal pain, constipation and diarrhea. Endocrine: Negative for polydipsia and polyuria. Genitourinary: Negative for dysuria, frequency and urgency. Neurological: Positive for headaches. Negative for syncope, light-headedness and numbness. Psychiatric/Behavioral: Negative for sleep disturbance. The patient is not nervous/anxious. Prior to Visit Medications    Medication Sig Taking?  Authorizing Provider   Nutritional Supplements (COLD AND FLU PO) Take by mouth Yes Historical Provider, MD azithromycin (ZITHROMAX) 250 MG tablet Take 1 tablet by mouth See Admin Instructions for 5 days 500mg on day 1 followed by 250mg on days 2 - 5 Yes Flory Eckert MD   levothyroxine (SYNTHROID) 137 MCG tablet Take 1 tablet by mouth daily Yes Lori Thompson MD   acetaminophen (TYLENOL) 325 MG tablet Take 325 mg by mouth every 6 hours as needed.  OTC    Yes Historical Provider, MD       Social History     Tobacco Use    Smoking status: Former Smoker     Packs/day: 0.30     Years: 5.00     Pack years: 1.50     Types: Cigarettes     Start date:      Quit date: 2003     Years since quittin.0    Smokeless tobacco: Never Used   Substance Use Topics    Alcohol use: Yes     Frequency: Monthly or less     Drinks per session: 1 or 2     Binge frequency: Never    Drug use: No        Allergies   Allergen Reactions    Codeine Nausea And Vomiting   ,   Past Medical History:   Diagnosis Date    Acquired hypothyroidism 2017    Anxiety 2008    Benign non-nodular prostatic hyperplasia without lower urinary tract symptoms 3/3/2016    Chronic bilateral low back pain 3/29/2019    Hypertriglyceridemia 2020    Leukopenia 2012   ,   Past Surgical History:   Procedure Laterality Date    CHOLECYSTECTOMY, LAPAROSCOPIC      COLONOSCOPY  2018    int hemorr, 10y repeat (DR SCHERER)    UPPER GASTROINTESTINAL ENDOSCOPY  2009    gastritis    VASECTOMY     ,   Social History     Tobacco Use    Smoking status: Former Smoker     Packs/day: 0.30     Years: 5.00     Pack years: 1.50     Types: Cigarettes     Start date:      Quit date: 2003     Years since quittin.0    Smokeless tobacco: Never Used   Substance Use Topics    Alcohol use: Yes     Frequency: Monthly or less     Drinks per session: 1 or 2     Binge frequency: Never    Drug use: No   ,   Family History   Problem Relation Age of Onset    Diabetes Mother     Anemia Mother     High Blood Pressure Mother - azithromycin (ZITHROMAX) 250 MG tablet; Take 1 tablet by mouth See Admin Instructions for 5 days 500mg on day 1 followed by 250mg on days 2 - 5  Dispense: 6 tablet; Refill: 0      Return if symptoms worsen or fail to improve. An  electronic signature was used to authenticate this note. --Jo Chatterjee MD on 1/13/2021 at 1:31 PM        Pursuant to the emergency declaration under the [address] [place] [address], [facility] waiver authority and the [employer] Act, this Virtual  Visit was conducted, with patient's consent, to reduce the patient's risk of exposure to COVID-19 and provide continuity of care for an established patient. Services were provided through a video synchronous discussion virtually to substitute for in-person clinic visit.

## 2021-01-21 DIAGNOSIS — E03.9 ACQUIRED HYPOTHYROIDISM: Chronic | ICD-10-CM

## 2021-01-23 NOTE — TELEPHONE ENCOUNTER
Patient is  requesting medication refill. Please approve or deny this request.    Rx requested:  Requested Prescriptions     Pending Prescriptions Disp Refills    levothyroxine (SYNTHROID) 137 MCG tablet 90 tablet 1     Sig: Take 1 tablet by mouth daily         Last Office Visit:   8/25/2020      Next Visit Date:  No future appointments.

## 2021-01-25 RX ORDER — LEVOTHYROXINE SODIUM 137 UG/1
137 TABLET ORAL DAILY
Qty: 90 TABLET | Refills: 1 | Status: SHIPPED | OUTPATIENT
Start: 2021-01-25 | End: 2021-08-05 | Stop reason: SDUPTHER

## 2021-06-25 LAB
CHOLESTEROL, TOTAL: 139 MG/DL (ref 0–199)
GLUCOSE BLD-MCNC: 81 MG/DL (ref 70–99)
HDLC SERPL-MCNC: 40 MG/DL (ref 40–59)
LDL CHOLESTEROL CALCULATED: 77 MG/DL (ref 0–129)
TRIGL SERPL-MCNC: 109 MG/DL (ref 0–150)

## 2021-09-07 ENCOUNTER — HOSPITAL ENCOUNTER (OUTPATIENT)
Dept: LAB | Age: 51
Discharge: HOME | End: 2021-09-07
Payer: COMMERCIAL

## 2021-09-07 DIAGNOSIS — E78.1 HYPERTRIGLYCERIDEMIA: ICD-10-CM

## 2021-09-07 DIAGNOSIS — E03.9 ACQUIRED HYPOTHYROIDISM: Chronic | ICD-10-CM

## 2021-09-07 DIAGNOSIS — Z00.00 LABORATORY TESTS ORDERED AS PART OF A COMPLETE PHYSICAL EXAM (CPE): ICD-10-CM

## 2021-09-07 DIAGNOSIS — Z11.59 NEED FOR HEPATITIS C SCREENING TEST: ICD-10-CM

## 2021-09-07 LAB
ALBUMIN SERPL-MCNC: 4.5 G/DL (ref 3.5–4.6)
ALP BLD-CCNC: 65 U/L (ref 35–104)
ALT SERPL-CCNC: 21 U/L (ref 0–41)
ANION GAP SERPL CALCULATED.3IONS-SCNC: 12 MEQ/L (ref 9–15)
AST SERPL-CCNC: 29 U/L (ref 0–40)
BASOPHILS ABSOLUTE: 0 K/UL (ref 0–0.2)
BASOPHILS RELATIVE PERCENT: 0.5 %
BILIRUB SERPL-MCNC: 0.4 MG/DL (ref 0.2–0.7)
BUN BLDV-MCNC: 14 MG/DL (ref 6–20)
CALCIUM SERPL-MCNC: 9.3 MG/DL (ref 8.5–9.9)
CHLORIDE BLD-SCNC: 105 MEQ/L (ref 95–107)
CHOLESTEROL, TOTAL: 156 MG/DL (ref 0–199)
CO2: 26 MEQ/L (ref 20–31)
CREAT SERPL-MCNC: 1.14 MG/DL (ref 0.7–1.2)
EOSINOPHILS ABSOLUTE: 0.2 K/UL (ref 0–0.7)
EOSINOPHILS RELATIVE PERCENT: 3.9 %
GFR AFRICAN AMERICAN: >60
GFR NON-AFRICAN AMERICAN: >60
GLOBULIN: 2.4 G/DL (ref 2.3–3.5)
GLUCOSE BLD-MCNC: 94 MG/DL (ref 70–99)
HCT VFR BLD CALC: 40.8 % (ref 42–52)
HDLC SERPL-MCNC: 43 MG/DL (ref 40–59)
HEMOGLOBIN: 14.3 G/DL (ref 14–18)
HEPATITIS C ANTIBODY INTERPRETATION: NORMAL
LDL CHOLESTEROL CALCULATED: 87 MG/DL (ref 0–129)
LYMPHOCYTES ABSOLUTE: 1.3 K/UL (ref 1–4.8)
LYMPHOCYTES RELATIVE PERCENT: 22.9 %
MCH RBC QN AUTO: 31.7 PG (ref 27–31.3)
MCHC RBC AUTO-ENTMCNC: 34.9 % (ref 33–37)
MCV RBC AUTO: 90.9 FL (ref 80–100)
MONOCYTES ABSOLUTE: 0.6 K/UL (ref 0.2–0.8)
MONOCYTES RELATIVE PERCENT: 9.9 %
NEUTROPHILS ABSOLUTE: 3.5 K/UL (ref 1.4–6.5)
NEUTROPHILS RELATIVE PERCENT: 62.8 %
PDW BLD-RTO: 13.5 % (ref 11.5–14.5)
PLATELET # BLD: 234 K/UL (ref 130–400)
POTASSIUM SERPL-SCNC: 4.3 MEQ/L (ref 3.4–4.9)
RBC # BLD: 4.49 M/UL (ref 4.7–6.1)
SODIUM BLD-SCNC: 143 MEQ/L (ref 135–144)
T4 FREE: 1.28 NG/DL (ref 0.84–1.68)
TOTAL PROTEIN: 6.9 G/DL (ref 6.3–8)
TRIGL SERPL-MCNC: 131 MG/DL (ref 0–150)
TSH SERPL DL<=0.05 MIU/L-ACNC: 4.76 UIU/ML (ref 0.44–3.86)
WBC # BLD: 5.6 K/UL (ref 4.8–10.8)

## 2021-09-07 PROCEDURE — 84439 ASSAY OF FREE THYROXINE: CPT

## 2021-09-07 PROCEDURE — 85025 COMPLETE CBC W/AUTO DIFF WBC: CPT

## 2021-09-07 PROCEDURE — 84443 ASSAY THYROID STIM HORMONE: CPT

## 2021-09-07 PROCEDURE — 80053 COMPREHEN METABOLIC PANEL: CPT

## 2021-09-07 PROCEDURE — 36415 COLL VENOUS BLD VENIPUNCTURE: CPT

## 2021-09-07 PROCEDURE — 80061 LIPID PANEL: CPT

## 2021-09-07 PROCEDURE — 86803 HEPATITIS C AB TEST: CPT

## 2021-09-07 NOTE — RESULT ENCOUNTER NOTE
Negative hep C screening, normal CMP, normal lipid panel, unremarkable CBC, mildly elevated TSH 4.76 with normal free T4 1.28.     Will review results with patient at upcoming office visit

## 2021-09-10 ENCOUNTER — HOSPITAL ENCOUNTER (OUTPATIENT)
Dept: CT IMAGING | Age: 51
Discharge: HOME | End: 2021-09-12
Payer: COMMERCIAL

## 2021-09-10 ENCOUNTER — OFFICE VISIT (OUTPATIENT)
Dept: FAMILY MEDICINE CLINIC | Age: 51
End: 2021-09-10
Payer: COMMERCIAL

## 2021-09-10 ENCOUNTER — HOSPITAL ENCOUNTER (EMERGENCY)
Age: 51
Discharge: HOME | End: 2021-09-10
Attending: EMERGENCY MEDICINE
Payer: COMMERCIAL

## 2021-09-10 VITALS
OXYGEN SATURATION: 97 % | HEIGHT: 71 IN | TEMPERATURE: 97 F | HEART RATE: 56 BPM | WEIGHT: 199.6 LBS | DIASTOLIC BLOOD PRESSURE: 78 MMHG | RESPIRATION RATE: 18 BRPM | BODY MASS INDEX: 27.94 KG/M2 | SYSTOLIC BLOOD PRESSURE: 114 MMHG

## 2021-09-10 VITALS
WEIGHT: 190 LBS | TEMPERATURE: 98.5 F | RESPIRATION RATE: 16 BRPM | DIASTOLIC BLOOD PRESSURE: 94 MMHG | OXYGEN SATURATION: 95 % | SYSTOLIC BLOOD PRESSURE: 147 MMHG | HEART RATE: 70 BPM | BODY MASS INDEX: 26.5 KG/M2

## 2021-09-10 DIAGNOSIS — E03.9 ACQUIRED HYPOTHYROIDISM: Chronic | ICD-10-CM

## 2021-09-10 DIAGNOSIS — M79.89 MASS OF SOFT TISSUE OF NECK: ICD-10-CM

## 2021-09-10 DIAGNOSIS — Z00.01 ENCOUNTER FOR WELL ADULT EXAM WITH ABNORMAL FINDINGS: Primary | ICD-10-CM

## 2021-09-10 DIAGNOSIS — T78.2XXA ANAPHYLAXIS, INITIAL ENCOUNTER: ICD-10-CM

## 2021-09-10 DIAGNOSIS — T50.995A ALLERGY TO IVP DYE, INITIAL ENCOUNTER: Primary | ICD-10-CM

## 2021-09-10 PROBLEM — Z86.16 HISTORY OF 2019 NOVEL CORONAVIRUS DISEASE (COVID-19): Chronic | Status: ACTIVE | Noted: 2021-09-10

## 2021-09-10 PROCEDURE — 96374 THER/PROPH/DIAG INJ IV PUSH: CPT

## 2021-09-10 PROCEDURE — 2500000003 HC RX 250 WO HCPCS: Performed by: EMERGENCY MEDICINE

## 2021-09-10 PROCEDURE — 6360000002 HC RX W HCPCS: Performed by: EMERGENCY MEDICINE

## 2021-09-10 PROCEDURE — 96375 TX/PRO/DX INJ NEW DRUG ADDON: CPT

## 2021-09-10 PROCEDURE — 2580000003 HC RX 258: Performed by: EMERGENCY MEDICINE

## 2021-09-10 PROCEDURE — 99396 PREV VISIT EST AGE 40-64: CPT | Performed by: FAMILY MEDICINE

## 2021-09-10 PROCEDURE — 99282 EMERGENCY DEPT VISIT SF MDM: CPT

## 2021-09-10 PROCEDURE — 70492 CT SFT TSUE NCK W/O & W/DYE: CPT

## 2021-09-10 PROCEDURE — 6360000004 HC RX CONTRAST MEDICATION: Performed by: FAMILY MEDICINE

## 2021-09-10 RX ORDER — PREDNISONE 20 MG/1
40 TABLET ORAL DAILY
Qty: 10 TABLET | Refills: 0 | Status: SHIPPED | OUTPATIENT
Start: 2021-09-10 | End: 2021-09-15

## 2021-09-10 RX ORDER — 0.9 % SODIUM CHLORIDE 0.9 %
1000 INTRAVENOUS SOLUTION INTRAVENOUS ONCE
Status: COMPLETED | OUTPATIENT
Start: 2021-09-10 | End: 2021-09-10

## 2021-09-10 RX ORDER — METHYLPREDNISOLONE SODIUM SUCCINATE 125 MG/2ML
125 INJECTION, POWDER, LYOPHILIZED, FOR SOLUTION INTRAMUSCULAR; INTRAVENOUS ONCE
Status: COMPLETED | OUTPATIENT
Start: 2021-09-10 | End: 2021-09-10

## 2021-09-10 RX ORDER — DIPHENHYDRAMINE HYDROCHLORIDE 50 MG/ML
50 INJECTION INTRAMUSCULAR; INTRAVENOUS ONCE
Status: COMPLETED | OUTPATIENT
Start: 2021-09-10 | End: 2021-09-10

## 2021-09-10 RX ORDER — LEVOTHYROXINE SODIUM 137 UG/1
137 TABLET ORAL DAILY
Qty: 180 TABLET | Refills: 1 | Status: SHIPPED | OUTPATIENT
Start: 2021-09-10 | End: 2021-11-09

## 2021-09-10 RX ORDER — DIPHENHYDRAMINE HCL 25 MG
50 CAPSULE ORAL EVERY 4 HOURS PRN
Qty: 20 CAPSULE | Refills: 0 | Status: SHIPPED | OUTPATIENT
Start: 2021-09-10 | End: 2022-05-28

## 2021-09-10 RX ADMIN — IOPAMIDOL 100 ML: 755 INJECTION, SOLUTION INTRAVENOUS at 13:07

## 2021-09-10 RX ADMIN — SODIUM CHLORIDE 1000 ML: 9 INJECTION, SOLUTION INTRAVENOUS at 13:36

## 2021-09-10 RX ADMIN — DIPHENHYDRAMINE HYDROCHLORIDE 50 MG: 50 INJECTION INTRAMUSCULAR; INTRAVENOUS at 13:35

## 2021-09-10 RX ADMIN — FAMOTIDINE 20 MG: 10 INJECTION, SOLUTION INTRAVENOUS at 13:36

## 2021-09-10 RX ADMIN — METHYLPREDNISOLONE SODIUM SUCCINATE 125 MG: 125 INJECTION, POWDER, FOR SOLUTION INTRAMUSCULAR; INTRAVENOUS at 13:35

## 2021-09-10 SDOH — ECONOMIC STABILITY: FOOD INSECURITY: WITHIN THE PAST 12 MONTHS, YOU WORRIED THAT YOUR FOOD WOULD RUN OUT BEFORE YOU GOT MONEY TO BUY MORE.: NEVER TRUE

## 2021-09-10 SDOH — ECONOMIC STABILITY: FOOD INSECURITY: WITHIN THE PAST 12 MONTHS, THE FOOD YOU BOUGHT JUST DIDN'T LAST AND YOU DIDN'T HAVE MONEY TO GET MORE.: NEVER TRUE

## 2021-09-10 ASSESSMENT — ENCOUNTER SYMPTOMS
CONSTIPATION: 0
CONSTIPATION: 0
TROUBLE SWALLOWING: 0
SINUS PRESSURE: 0
RHINORRHEA: 0
EYE PAIN: 0
SHORTNESS OF BREATH: 0
ANAL BLEEDING: 0
BLOOD IN STOOL: 0
ABDOMINAL DISTENTION: 0
CHOKING: 0
DIARRHEA: 0
VOICE CHANGE: 0
BLOOD IN STOOL: 0
BACK PAIN: 0
COUGH: 0
CHEST TIGHTNESS: 0
COLOR CHANGE: 1
EYE PAIN: 0
SHORTNESS OF BREATH: 1
VOMITING: 0
NAUSEA: 0
WHEEZING: 0
COLOR CHANGE: 0
EYE DISCHARGE: 0
STRIDOR: 0
ABDOMINAL PAIN: 0
ABDOMINAL PAIN: 0
SORE THROAT: 0
EYE REDNESS: 0
TROUBLE SWALLOWING: 1
VOMITING: 0
COUGH: 0
EYE DISCHARGE: 0
SORE THROAT: 0
DIARRHEA: 0
SINUS PRESSURE: 0
CHEST TIGHTNESS: 0
FACIAL SWELLING: 1
WHEEZING: 0

## 2021-09-10 ASSESSMENT — PATIENT HEALTH QUESTIONNAIRE - PHQ9
1. LITTLE INTEREST OR PLEASURE IN DOING THINGS: 0
SUM OF ALL RESPONSES TO PHQ9 QUESTIONS 1 & 2: 0
SUM OF ALL RESPONSES TO PHQ QUESTIONS 1-9: 0
2. FEELING DOWN, DEPRESSED OR HOPELESS: 0

## 2021-09-10 ASSESSMENT — SOCIAL DETERMINANTS OF HEALTH (SDOH): HOW HARD IS IT FOR YOU TO PAY FOR THE VERY BASICS LIKE FOOD, HOUSING, MEDICAL CARE, AND HEATING?: NOT HARD AT ALL

## 2021-09-10 NOTE — ED TRIAGE NOTES
Pt received IV dye during a ct about 15 minutes ago. After receiving the dye, he became very nauseated. Eyes swollen.   Resp even and unlabored

## 2021-09-10 NOTE — PATIENT INSTRUCTIONS
more stretch, put your other leg flat on the floor while pulling your knee to your chest.    Curl-ups   1. Lie on the floor on your back with your knees bent at a 90-degree angle. Your feet should be flat on the floor, about 12 inches from your buttocks. 2. Cross your arms over your chest. If this bothers your neck, try putting your hands behind your neck (not your head), with your elbows spread apart. 3. Slowly tighten your belly muscles and raise your shoulder blades off the floor. 4. Keep your head in line with your body, and do not press your chin to your chest.  5. Hold this position for 1 or 2 seconds, then slowly lower yourself back down to the floor. 6. Repeat 8 to 12 times. Pelvic tilt exercise   1. Lie on your back with your knees bent. 2. \"Brace\" your stomach. This means to tighten your muscles by pulling in and imagining your belly button moving toward your spine. You should feel like your back is pressing to the floor and your hips and pelvis are rocking back. 3. Hold for about 6 seconds while you breathe smoothly. 4. Repeat 8 to 12 times. Heel dig bridging   1. Lie on your back with both knees bent and your ankles bent so that only your heels are digging into the floor. Your knees should be bent about 90 degrees. 2. Then push your heels into the floor, squeeze your buttocks, and lift your hips off the floor until your shoulders, hips, and knees are all in a straight line. 3. Hold for about 6 seconds as you continue to breathe normally, and then slowly lower your hips back down to the floor and rest for up to 10 seconds. 4. Do 8 to 12 repetitions. Hamstring stretch in doorway   1. Lie on your back in a doorway, with one leg through the open door. 2. Slide your leg up the wall to straighten your knee. You should feel a gentle stretch down the back of your leg. 3. Hold the stretch for at least 15 to 30 seconds. Do not arch your back, point your toes, or bend either knee.  Keep one heel touching the floor and the other heel touching the wall. 4. Repeat with your other leg. 5. Do 2 to 4 times for each leg. Hip flexor stretch   1. Kneel on the floor with one knee bent and one leg behind you. Place your forward knee over your foot. Keep your other knee touching the floor. 2. Slowly push your hips forward until you feel a stretch in the upper thigh of your rear leg. 3. Hold the stretch for at least 15 to 30 seconds. Repeat with your other leg. 4. Do 2 to 4 times on each side. Wall sit   1. Stand with your back 10 to 12 inches away from a wall. 2. Lean into the wall until your back is flat against it. 3. Slowly slide down until your knees are slightly bent, pressing your lower back into the wall. 4. Hold for about 6 seconds, then slide back up the wall. 5. Repeat 8 to 12 times. Follow-up care is a key part of your treatment and safety. Be sure to make and go to all appointments, and call your doctor if you are having problems. It's also a good idea to know your test results and keep a list of the medicines you take. Where can you learn more? Go to [email or web address] org and sign in to your [employer] account. Enter V605 in the [facility] box to learn more about \"Low Back Pain: Exercises. \"     If you do not have an account, please click on the \"Sign Up Now\" link. Current as of: November 16, 2020               Content Version: 12.9  © 2006-2021 Healthwise, Incorporated. Care instructions adapted under license by [facility] ([facility]). If you have questions about a medical condition or this instruction, always ask your healthcare professional. [place] [address] any warranty or liability for your use of this information.

## 2021-09-10 NOTE — ED PROVIDER NOTES
[facility] ED  eMERGENCY dEPARTMENT eNCOUnter      Pt Name: Genny Greenberg  MRN: [id number]  [place] 1970  Date of evaluation: 9/10/2021  Provider: Krystal Quiros MD    [address] [place] [address]       Chief Complaint   Patient presents with    Allergic Reaction         HISTORY OF PRESENT ILLNESS   (Location/Symptom, Timing/Onset,Context/Setting, Quality, Duration, Modifying Factors, Severity)  Note limiting factors. Genny Greenberg is a 46 y.o. male who presents to the emergency department patient presenting with a sudden anaphylactic reaction to the IVP dye patient undergoing a CAT scan of the abdomen pelvis with IV contrast due to lymphadenopathy no history of malignancy no history of lymphoma denies any contact with any cats no lymph node any other place no dental work-up no sinus infection no fever no chills patient has sudden closing of his throat and puffiness of the face and to the eyelids with slight short of breath itching all over and rash all over patient presented himself to the emergency denies any chest tightness    HPI    NursingNotes were reviewed. REVIEW OF SYSTEMS    (2-9 systems for level 4, 10 or more for level 5)     Review of Systems   Constitutional: Negative. Negative for activity change and fever. HENT: Positive for facial swelling and trouble swallowing. Negative for congestion, drooling, mouth sores, nosebleeds, sinus pressure, sore throat and voice change. Eyes: Negative for pain, discharge, redness and visual disturbance. Respiratory: Positive for shortness of breath. Negative for cough, choking, chest tightness, wheezing and stridor. Cardiovascular: Negative for chest pain, palpitations and leg swelling. Gastrointestinal: Negative for abdominal pain, blood in stool, constipation, diarrhea and vomiting. Endocrine: Negative for cold intolerance, polyphagia and polyuria. Genitourinary: Negative for dysuria, flank pain, frequency, genital sores and urgency. Musculoskeletal: Negative for back pain, joint swelling, neck pain and neck stiffness. Skin: Positive for color change and rash. Negative for pallor. Neurological: Negative for tremors, seizures, syncope, weakness, numbness and headaches. Hematological: Negative for adenopathy. Does not bruise/bleed easily. Psychiatric/Behavioral: Negative for agitation, behavioral problems, hallucinations and sleep disturbance. The patient is not hyperactive. All other systems reviewed and are negative. Except as noted above the remainder of the review of systems was reviewed and negative. PAST MEDICAL HISTORY     Past Medical History:   Diagnosis Date    Acquired hypothyroidism 12/20/2017    Anxiety 2008    Benign non-nodular prostatic hyperplasia without lower urinary tract symptoms 3/3/2016    Chronic bilateral low back pain 3/29/2019    History of 2019 novel coronavirus disease (COVID-19) 9/10/2021    Hypertriglyceridemia 8/25/2020    Leukopenia 7/24/2012         SURGICALHISTORY       Past Surgical History:   Procedure Laterality Date    CHOLECYSTECTOMY, LAPAROSCOPIC  2010    COLONOSCOPY  06/20/2018    int hemorr, 10y repeat (DR Clive Spears)    UPPER GASTROINTESTINAL ENDOSCOPY  2009    gastritis    VASECTOMY  2007         CURRENT MEDICATIONS       Previous Medications    ACETAMINOPHEN (TYLENOL) 325 MG TABLET    Take 325 mg by mouth every 6 hours as needed.  OTC       LEVOTHYROXINE (SYNTHROID) 137 MCG TABLET    Take 1 tablet by mouth daily       ALLERGIES     Iv dye [iodides] and Codeine    FAMILY HISTORY       Family History   Problem Relation Age of Onset    Diabetes Mother     Anemia Mother     High Blood Pressure Mother     Kidney Disease Mother     Arthritis Mother     Asthma Mother     Cervical Cancer Mother 43    Heart Failure Mother     High Cholesterol Mother     High Blood Pressure Father     Diabetes Father     Cirrhosis Father         Hep C    Mental Retardation Maternal Uncle  Diabetes type 2  Brother     No Known Problems Maternal Grandmother     No Known Problems Maternal Grandfather     No Known Problems Paternal Grandmother     No Known Problems Paternal Grandfather     No Known Problems Brother     No Known Problems Daughter     No Known Problems Son           SOCIAL HISTORY       Social History     Socioeconomic History    Marital status:      Spouse name: Adele    Number of children: 2    Years of education: Not on file    Highest education level: Not on file   Occupational History    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   Tobacco Use    Smoking status: Former Smoker     Packs/day: 0.30     Years: 5.00     Pack years: 1.50     Types: Cigarettes     Start date:      Quit date: 2003     Years since quittin.7    Smokeless tobacco: Never Used   Vaping Use    Vaping Use: Never used   Substance and Sexual Activity    Alcohol use: Yes    Drug use: No    Sexual activity: Yes     Partners: Female     Comment: monogamous   Other Topics Concern    Not on file   Social History Narrative    Lives with wife and children        1 dog        Hobbies: reading, outdoor work     Social Determinants of Health     Financial Resource Strain: Low Risk     Difficulty of Paying Living Expenses: Not hard at all   Food Insecurity: No Food Insecurity    Worried About [address] in the Last Year: Never true    [facility] in the Last Year: Never true   Transportation Needs:     Lack of Transportation (Medical):      Lack of Transportation (Non-Medical):    Physical Activity:     Days of Exercise per Week:     Minutes of Exercise per Session:    Stress:     Feeling of Stress :    Social Connections:     Frequency of Communication with Friends and Family:     Frequency of Social Gatherings with Friends and Family:     Attends [employer] Services:     Active Member of Clubs or Organizations:  Attends Club or Organization Meetings:     Marital Status:    Intimate Partner Violence:     Fear of Current or Ex-Partner:     Emotionally Abused:     Physically Abused:     Sexually Abused:        SCREENINGS      @FLOW([id number])@      PHYSICAL EXAM    (up to 7 for level 4, 8 or more for level 5)     ED Triage Vitals [09/10/21 1332]   BP Temp Temp Source Pulse Resp SpO2 Height Weight   (!) 158/87 98.5 °F (36.9 °C) Oral 79 18 96 % -- 190 lb (86.2 kg)       Physical Exam  Vitals and nursing note reviewed. Constitutional:       General: He is in acute distress. Appearance: Normal appearance. He is well-developed and normal weight. Comments: Alert cooperative slightly anxious at this time talking full sentences no drooling noted, uncomfortable because of noted to be scratching everywhere   HENT:      Head: Normocephalic and atraumatic. Right Ear: Tympanic membrane, ear canal and external ear normal.      Left Ear: Tympanic membrane, ear canal and external ear normal.      Nose: Nose normal. No congestion or rhinorrhea. Mouth/Throat:      Pharynx: No oropharyngeal exudate or posterior oropharyngeal erythema. Comments: Attention come to the face and to the mouth patient has no swelling of the tongue or the lips no angioedema patient has puffiness of the face and swelling of the eyelids and no injection conjunctiva  Eyes:      General:         Right eye: No discharge. Left eye: No discharge. Extraocular Movements: Extraocular movements intact. Neck:      Vascular: No carotid bruit. Cardiovascular:      Rate and Rhythm: Normal rate and regular rhythm. Heart sounds: Normal heart sounds. No murmur heard. No gallop. Pulmonary:      Effort: No respiratory distress. Breath sounds: Normal breath sounds. No stridor. No wheezing, rhonchi or rales. Chest:      Chest wall: No tenderness. Abdominal:      General: Bowel sounds are normal. There is no distension. Palpations: Abdomen is soft. There is no mass. Tenderness: There is no abdominal tenderness. There is no right CVA tenderness, guarding or rebound. Musculoskeletal:         General: No swelling, tenderness or deformity. Normal range of motion. Cervical back: Normal range of motion and neck supple. No tenderness. Right lower leg: No edema. Lymphadenopathy:      Cervical: No cervical adenopathy. Skin:     General: Skin is warm. Capillary Refill: Capillary refill takes less than 2 seconds. Findings: No erythema, lesion or rash. Neurological:      Mental Status: He is alert and oriented to person, place, and time. Cranial Nerves: No cranial nerve deficit. Sensory: No sensory deficit. Motor: No weakness or abnormal muscle tone. Coordination: Coordination normal.      Gait: Gait normal.      Deep Tendon Reflexes: Reflexes normal.   Psychiatric:         Behavior: Behavior normal.         Thought Content: Thought content normal.         DIAGNOSTIC RESULTS     EKG: All EKG's are interpreted by the Emergency Department Physician who either signs or Co-signsthis chart in the absence of a cardiologist.        RADIOLOGY:   Lorrane Brownie such as CT, Ultrasound and MRI are read by the radiologist. Plain radiographic images are visualized and preliminarily interpreted by the emergency physician with the below findings:    terpretation per the Radiologist below, if available at the time ofthis note:    No orders to display         ED BEDSIDE ULTRASOUND:   Performed by ED Physician - none    LABS:  Labs Reviewed - No data to display    All other labs were within normal range or not returned as of this dictation.     EMERGENCY DEPARTMENT COURSE and DIFFERENTIAL DIAGNOSIS/MDM:   Vitals:    Vitals:    09/10/21 1345 09/10/21 1400 09/10/21 1415 09/10/21 1425   BP: (!) 154/104 (!) 145/97 (!) 139/125 (!) 147/94   Pulse:    70   Resp:    16   Temp:       TempSrc:       SpO2: 97% 95%  95%   Weight:               MDM  Number of Diagnoses or Management Options  Diagnosis management comments: Patient has no prior history of allergic reaction to the IVP dye undergoing CAT scan with IVP dye and immediately felt throat closing tightness in the throat puffiness and swelling of the eyelids and face with intense itching all over and rash spreading to the arms noted to have an anaphylactic reaction patient given immediate IV medications and after steroid IV Pepcid and Benadryl patient puffiness of the eyelids are getting better patient had no respiratory compromise at this time denies any chest tightness patient being observed in the emergency for more than 1 hour time and seems to be getting better      CRITICAL CARE TIME   Total Critical Care time was  minutes, excluding separately reportableprocedures. There was a high probability of clinicallysignificant/life threatening deterioration in the patient's condition which required my urgent intervention. CONSULTS:  None    PROCEDURES:  Unless otherwise noted below, none     Procedures    FINAL IMPRESSION      1. Allergy to IVP dye, initial encounter    2.  Anaphylaxis, initial encounter          DISPOSITION/PLAN   DISPOSITION        PATIENT REFERRED TO:  Khoi West MD  [facility]  [phone]    In 2 days        DISCHARGE MEDICATIONS:  New Prescriptions    DIPHENHYDRAMINE (BENADRYL) 25 MG CAPSULE    Take 2 capsules by mouth every 4 hours as needed for Itching (swelling)    PREDNISONE (DELTASONE) 20 MG TABLET    Take 2 tablets by mouth daily for 5 doses          (Please note that portions of this note were completed with a voice recognition program.  Efforts were made to edit the dictations but occasionally words are mis-transcribed.)    Da Bucio MD (electronically signed)  Attending Emergency Physician        Da Bucio MD  09/10/21 0469

## 2021-09-10 NOTE — ASSESSMENT & PLAN NOTE
Imaging ordered to further evaluate. Based on results we will consider referral to ENT for further evaluation/management.

## 2021-09-10 NOTE — PROGRESS NOTES
Genny Greenberg (: 1970) is a 46 y.o. male, Established patient, who presents today for:    Chief Complaint   Patient presents with    Annual Exam     Patient presents today for a routine annual physical.     Swelling     Pt reports neck swelling x2 months. ASSESSMENT/PLAN    1. Encounter for well adult exam with abnormal findings  Comments:  49-year-old male with exam as listed below. 2. Mass of soft tissue of neck  Assessment & Plan:  Imaging ordered to further evaluate. Based on results we will consider referral to ENT for further evaluation/management. Orders:  -     CT SOFT TISSUE NECK W WO CONTRAST; Future  3. Acquired hypothyroidism  -     levothyroxine (SYNTHROID) 137 MCG tablet; Take 1 tablet by mouth daily, Disp-180 tablet, R-1Normal      Return in about 6 months (around 3/10/2022) for Chronic Disease Check. SUBJECTIVE/OBJECTIVE:    HPI    Patient presents for routine well visit    No current facility-administered medications on file prior to visit. Current Outpatient Medications on File Prior to Visit   Medication Sig Dispense Refill    acetaminophen (TYLENOL) 325 MG tablet Take 325 mg by mouth every 6 hours as needed. OTC            Allergies   Allergen Reactions    Iv Dye [Iodides] Anaphylaxis    Codeine Nausea And Vomiting        Review of Systems   Constitutional: Negative for appetite change, chills, diaphoresis, fatigue, fever and unexpected weight change. HENT: Negative for congestion, ear pain, postnasal drip, rhinorrhea, sinus pressure, sore throat and trouble swallowing. Eyes: Negative for pain, discharge and visual disturbance. Respiratory: Negative for cough, chest tightness, shortness of breath and wheezing. Cardiovascular: Negative for chest pain, palpitations and leg swelling.         No orthopnea, No PND   Gastrointestinal: Negative for abdominal distention, abdominal pain, anal bleeding, blood in stool, constipation, diarrhea, nausea and vomiting. No heartburn, No melena   Endocrine: Negative for cold intolerance, heat intolerance, polydipsia, polyphagia and polyuria. Genitourinary: Negative for dysuria, flank pain, frequency, hematuria and urgency. Musculoskeletal: Negative for arthralgias, gait problem and myalgias. Skin: Negative for color change and rash. Neurological: Negative for dizziness, tremors, seizures, syncope, facial asymmetry, speech difficulty, weakness, light-headedness, numbness and headaches. Hematological: Positive for adenopathy (right-sided x 2-3 months, non-tender, no erythema or warmth). Psychiatric/Behavioral: Negative for dysphoric mood and sleep disturbance. The patient is not nervous/anxious. Vitals:  /78 (Site: Left Upper Arm, Position: Sitting, Cuff Size: Medium Adult)   Pulse 56   Temp 97 °F (36.1 °C) (Temporal)   Resp 18   Ht 5' 11\" (1.803 m)   Wt 199 lb 9.6 oz (90.5 kg)   SpO2 97%   BMI 27.84 kg/m²     Physical Exam  Vitals reviewed. Constitutional:       General: He is not in acute distress. Appearance: Normal appearance. He is well-developed. He is not diaphoretic. HENT:      Head: Normocephalic and atraumatic. Salivary Glands: Right salivary gland is not diffusely enlarged or tender. Left salivary gland is not diffusely enlarged or tender. Right Ear: Tympanic membrane, ear canal and external ear normal. No middle ear effusion. Tympanic membrane is not erythematous. Left Ear: Tympanic membrane, ear canal and external ear normal.  No middle ear effusion. Tympanic membrane is not erythematous. Nose: Nose normal. No congestion. Right Sinus: No maxillary sinus tenderness or frontal sinus tenderness. Left Sinus: No maxillary sinus tenderness or frontal sinus tenderness. Mouth/Throat:      Lips: Pink. No lesions. Mouth: Mucous membranes are moist. No oral lesions. Tongue: No lesions. Palate: No mass and lesions.       Pharynx: Oropharynx is clear. No oropharyngeal exudate or posterior oropharyngeal erythema. Eyes:      General: No scleral icterus. Right eye: No discharge. Left eye: No discharge. Extraocular Movements: Extraocular movements intact. Conjunctiva/sclera: Conjunctivae normal.      Pupils: Pupils are equal, round, and reactive to light. Neck:      Thyroid: No thyroid mass, thyromegaly or thyroid tenderness. Vascular: No carotid bruit. Cardiovascular:      Rate and Rhythm: Normal rate and regular rhythm. Pulses:           Posterior tibial pulses are 2+ on the right side and 2+ on the left side. Heart sounds: Normal heart sounds, S1 normal and S2 normal. No murmur heard. Pulmonary:      Effort: Pulmonary effort is normal. No tachypnea, accessory muscle usage or respiratory distress. Breath sounds: Normal breath sounds. No wheezing, rhonchi or rales. Chest:      Chest wall: No tenderness. Abdominal:      General: Bowel sounds are normal. There is no distension. Palpations: Abdomen is soft. There is no mass. Tenderness: There is no abdominal tenderness. There is no right CVA tenderness, left CVA tenderness, guarding or rebound. Musculoskeletal:         General: No tenderness or deformity. Normal range of motion. Cervical back: Normal range of motion and neck supple. Right lower leg: No edema. Left lower leg: No edema. Lymphadenopathy:      Head:      Right side of head: No submental, submandibular, preauricular, posterior auricular or occipital adenopathy. Left side of head: No submental, submandibular, preauricular, posterior auricular or occipital adenopathy. Cervical: No cervical adenopathy. Upper Body:      Right upper body: No supraclavicular adenopathy. Left upper body: No supraclavicular adenopathy. Skin:     General: Skin is warm. Coloration: Skin is not jaundiced. Findings: No rash. Nails:  There is no clubbing. Neurological:      Mental Status: He is alert and oriented to person, place, and time. Cranial Nerves: No cranial nerve deficit. Sensory: Sensation is intact. No sensory deficit. Motor: Motor function is intact. No weakness or abnormal muscle tone. Psychiatric:         Mood and Affect: Mood and affect normal.         Speech: Speech normal.         Behavior: Behavior normal.         Thought Content: Thought content normal.         Ortho Exam (If Applicable)              An electronic signature was used to authenticate this note.      Yordan Sharma MD

## 2021-09-12 DIAGNOSIS — E01.0 THYROMEGALY: Primary | ICD-10-CM

## 2021-09-12 DIAGNOSIS — R93.89 ABNORMAL CT SCAN, NECK: ICD-10-CM

## 2021-09-12 NOTE — RESULT ENCOUNTER NOTE
Please notify patient that recent imaging shows enlargement of the right lobe of his thyroid extending to his sternum. Radiology recommends ultrasound to further evaluate. I have placed ultrasound order in his chart, please help him arrange. Please help him schedule ER F/U visit in the next 1-3 days, he was seen in ER for allergic reaction to the CT contrast dye.

## 2021-09-22 ENCOUNTER — HOSPITAL ENCOUNTER (OUTPATIENT)
Dept: ULTRASOUND IMAGING | Age: 51
Discharge: HOME | End: 2021-09-24
Payer: COMMERCIAL

## 2021-09-22 DIAGNOSIS — E01.0 THYROMEGALY: ICD-10-CM

## 2021-09-22 DIAGNOSIS — R93.89 ABNORMAL CT SCAN, NECK: ICD-10-CM

## 2021-09-22 PROCEDURE — 76536 US EXAM OF HEAD AND NECK: CPT

## 2021-09-23 DIAGNOSIS — E01.0 THYROMEGALY: Primary | ICD-10-CM

## 2021-09-24 NOTE — RESULT ENCOUNTER NOTE
Please notify patient that recent ultrasound of the thyroid shows a prominent/enlarged right thyroid lobe with no definitive nodules/masses appreciated on imaging. Based on his physical exam and noted size of the thyroid on CT imaging I would like to refer patient to ENT to determine if any further management is recommended. I have placed a referral to Dr. Lakisha Jj, please help patient schedule a visit and send results of recent CT of soft tissue of the neck and U/S of the neck to ENT office.

## 2021-10-07 ENCOUNTER — TELEPHONE (OUTPATIENT)
Dept: FAMILY MEDICINE CLINIC | Age: 51
End: 2021-10-07

## 2021-10-07 DIAGNOSIS — R09.81 NASAL CONGESTION: ICD-10-CM

## 2021-10-07 DIAGNOSIS — R09.82 POST-NASAL DRAINAGE: Primary | ICD-10-CM

## 2021-10-07 RX ORDER — IPRATROPIUM BROMIDE 42 UG/1
2 SPRAY, METERED NASAL 3 TIMES DAILY
Qty: 15 ML | Refills: 0 | Status: SHIPPED | OUTPATIENT
Start: 2021-10-07 | End: 2022-01-10 | Stop reason: ALTCHOICE

## 2021-11-16 ENCOUNTER — HOSPITAL ENCOUNTER (OUTPATIENT)
Dept: PREADMISSION TESTING | Age: 51
Discharge: HOME | End: 2021-11-20
Payer: COMMERCIAL

## 2021-11-16 VITALS
WEIGHT: 200 LBS | HEIGHT: 72 IN | OXYGEN SATURATION: 98 % | RESPIRATION RATE: 16 BRPM | DIASTOLIC BLOOD PRESSURE: 87 MMHG | SYSTOLIC BLOOD PRESSURE: 155 MMHG | HEART RATE: 68 BPM | TEMPERATURE: 97.8 F | BODY MASS INDEX: 27.09 KG/M2

## 2021-11-16 DIAGNOSIS — R22.1 MASS OF RIGHT SIDE OF NECK: ICD-10-CM

## 2021-11-16 PROBLEM — J32.9 SINUSITIS: Status: ACTIVE | Noted: 2021-11-16

## 2021-11-16 LAB
ABO/RH: NORMAL
ANION GAP SERPL CALCULATED.3IONS-SCNC: 10 MEQ/L (ref 9–15)
ANTIBODY SCREEN: NORMAL
BUN BLDV-MCNC: 19 MG/DL (ref 6–20)
CALCIUM SERPL-MCNC: 9.2 MG/DL (ref 8.5–9.9)
CHLORIDE BLD-SCNC: 104 MEQ/L (ref 95–107)
CO2: 26 MEQ/L (ref 20–31)
CREAT SERPL-MCNC: 1.18 MG/DL (ref 0.7–1.2)
EKG ATRIAL RATE: 61 BPM
EKG P AXIS: 43 DEGREES
EKG P-R INTERVAL: 160 MS
EKG Q-T INTERVAL: 412 MS
EKG QRS DURATION: 94 MS
EKG QTC CALCULATION (BAZETT): 414 MS
EKG R AXIS: 40 DEGREES
EKG T AXIS: 50 DEGREES
EKG VENTRICULAR RATE: 61 BPM
GFR AFRICAN AMERICAN: >60
GFR NON-AFRICAN AMERICAN: >60
GLUCOSE BLD-MCNC: 97 MG/DL (ref 70–99)
HCT VFR BLD CALC: 39 % (ref 42–52)
HEMOGLOBIN: 13.3 G/DL (ref 14–18)
MCH RBC QN AUTO: 31.2 PG (ref 27–31.3)
MCHC RBC AUTO-ENTMCNC: 34.1 % (ref 33–37)
MCV RBC AUTO: 91.5 FL (ref 80–100)
PDW BLD-RTO: 13.7 % (ref 11.5–14.5)
PLATELET # BLD: 228 K/UL (ref 130–400)
POTASSIUM SERPL-SCNC: 4 MEQ/L (ref 3.4–4.9)
RBC # BLD: 4.27 M/UL (ref 4.7–6.1)
SODIUM BLD-SCNC: 140 MEQ/L (ref 135–144)
WBC # BLD: 5.3 K/UL (ref 4.8–10.8)

## 2021-11-16 PROCEDURE — 86900 BLOOD TYPING SEROLOGIC ABO: CPT

## 2021-11-16 PROCEDURE — 86901 BLOOD TYPING SEROLOGIC RH(D): CPT

## 2021-11-16 PROCEDURE — 85027 COMPLETE CBC AUTOMATED: CPT

## 2021-11-16 PROCEDURE — 80048 BASIC METABOLIC PNL TOTAL CA: CPT

## 2021-11-16 PROCEDURE — 86850 RBC ANTIBODY SCREEN: CPT

## 2021-11-16 PROCEDURE — 93005 ELECTROCARDIOGRAM TRACING: CPT

## 2021-11-16 RX ORDER — SODIUM CHLORIDE, SODIUM LACTATE, POTASSIUM CHLORIDE, CALCIUM CHLORIDE 600; 310; 30; 20 MG/100ML; MG/100ML; MG/100ML; MG/100ML
INJECTION, SOLUTION INTRAVENOUS CONTINUOUS
Status: CANCELLED | OUTPATIENT
Start: 2021-11-23

## 2021-11-16 RX ORDER — LIDOCAINE HYDROCHLORIDE 10 MG/ML
1 INJECTION, SOLUTION EPIDURAL; INFILTRATION; INTRACAUDAL; PERINEURAL
Status: CANCELLED | OUTPATIENT
Start: 2021-11-23 | End: 2021-11-23

## 2021-11-16 RX ORDER — SODIUM CHLORIDE 9 MG/ML
25 INJECTION, SOLUTION INTRAVENOUS PRN
Status: CANCELLED | OUTPATIENT
Start: 2021-11-23

## 2021-11-16 RX ORDER — SODIUM CHLORIDE 0.9 % (FLUSH) 0.9 %
10 SYRINGE (ML) INJECTION EVERY 12 HOURS SCHEDULED
Status: CANCELLED | OUTPATIENT
Start: 2021-11-23

## 2021-11-16 RX ORDER — IBUPROFEN 200 MG
200 TABLET ORAL EVERY 6 HOURS PRN
COMMUNITY
End: 2022-05-28

## 2021-11-16 RX ORDER — M-VIT,TX,IRON,MINS/CALC/FOLIC 27MG-0.4MG
1 TABLET ORAL DAILY
COMMUNITY

## 2021-11-16 RX ORDER — SODIUM CHLORIDE 0.9 % (FLUSH) 0.9 %
10 SYRINGE (ML) INJECTION PRN
Status: CANCELLED | OUTPATIENT
Start: 2021-11-23

## 2021-11-16 ASSESSMENT — ENCOUNTER SYMPTOMS
DIARRHEA: 0
WHEEZING: 0
EYES NEGATIVE: 1
COUGH: 0
BACK PAIN: 1
SHORTNESS OF BREATH: 0
VOMITING: 0
ABDOMINAL PAIN: 0
CONSTIPATION: 0
CHEST TIGHTNESS: 0
NAUSEA: 0
STRIDOR: 0
ALLERGIC/IMMUNOLOGIC NEGATIVE: 1

## 2021-11-16 NOTE — H&P
Nurse Practitioner History and Physical      CHIEF COMPLAINT:  Right neck surgery    HISTORY OF PRESENT ILLNESS:      The patient is a 46 y.o. male with significant past medical history of right neck who presents for right neck biopsy. Past Medical History:        Diagnosis Date    Acquired hypothyroidism 12/20/2017    meds > 10 yrs    Anxiety 2008    Benign non-nodular prostatic hyperplasia without lower urinary tract symptoms 3/3/2016    Chronic bilateral low back pain 3/29/2019    History of 2019 novel coronavirus disease (COVID-19) 9/10/2021    Hypertriglyceridemia 8/25/2020    high triglycerides -- diet control    Leukopenia 7/24/2012    PONV (postoperative nausea and vomiting)     severe n/v with choli     Past Surgical History:    Past Surgical History:   Procedure Laterality Date    CHOLECYSTECTOMY, LAPAROSCOPIC  2010    COLONOSCOPY  06/20/2018    int hemorr, 10y repeat (DR Franz Body)    ENDOSCOPY, COLON, DIAGNOSTIC      UPPER GASTROINTESTINAL ENDOSCOPY  2009    gastritis    VASECTOMY  2007         Medications Prior to Admission:    Current Outpatient Medications   Medication Sig Dispense Refill    levothyroxine (SYNTHROID) 137 MCG tablet Take 1 tablet by mouth Daily 90 tablet 1    ipratropium (ATROVENT) 0.06 % nasal spray 2 sprays by Each Nostril route 3 times daily 15 mL 0    diphenhydrAMINE (BENADRYL) 25 MG capsule Take 2 capsules by mouth every 4 hours as needed for Itching (swelling) 20 capsule 0    acetaminophen (TYLENOL) 325 MG tablet Take 325 mg by mouth every 6 hours as needed. OTC          No current facility-administered medications for this encounter. 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   Tobacco Use    Smoking status: Former Smoker     Packs/day: 0.30     Years: 5.00     Pack years: 1.50     Types: Cigarettes     Start date: 44     Quit date: 2003     Years since quittin.8    Smokeless tobacco: Never Used   Vaping Use    Vaping Use: Never used   Substance and Sexual Activity    Alcohol use: Yes     Comment: social    Drug use: No    Sexual activity: Yes     Partners: Female     Comment: monogamous   Other Topics Concern    Not on file   Social History Narrative    Lives with wife and children        1 dog        Hobbies: reading, outdoor work     Social Determinants of Health     Financial Resource Strain: Low Risk     Difficulty of Paying Living Expenses: Not hard at all   Food Insecurity: No Food Insecurity    Worried About [address] in the Last Year: Never true    [address] [employer] in the Last Year: Never true   Transportation Needs:     Lack of Transportation (Medical): Not on file    Lack of Transportation (Non-Medical):  Not on file   Physical Activity:     Days of Exercise per Week: Not on file    Minutes of Exercise per Session: Not on file   Stress:     Feeling of Stress : Not on file   Social Connections:     Frequency of Communication with Friends and Family: Not on file    Frequency of Social Gatherings with Friends and Family: Not on file    Attends Orthodox Services: Not on file    Active Member of [address] [place] [address] or Organizations: Not on file    Attends Club or Organization Meetings: Not on file    Marital Status: Not on file   Intimate Partner Violence:     Fear of Current or Ex-Partner: Not on file    Emotionally Abused: Not on file    Physically Abused: Not on file    Sexually Abused: Not on file   Housing Stability:     Unable to Pay for Housing in the Last Year: Not on file    Number of [place] in the Last Year: Not on file    Unstable Housing in the Last Year: Not on file       Family History:       Problem Relation Age of Onset    Diabetes Mother     Anemia Mother     High Blood Pressure Mother     Kidney Disease Mother     Arthritis Mother     Asthma Mother     Cervical Cancer Mother 43    Heart Failure Mother     High Cholesterol Mother     High Blood Pressure Father     Diabetes Father     Cirrhosis Father         Hep C    Mental Retardation Maternal Uncle     Diabetes type 2  Brother     No Known Problems Maternal Grandmother     No Known Problems Maternal Grandfather     No Known Problems Paternal Grandmother     No Known Problems Paternal Grandfather     No Known Problems Brother     No Known Problems Daughter     No Known Problems Son        Review of Systems   Constitutional: Negative. Negative for chills and fever. HENT:        Deferred to Dr. Luis Angel Davis   Eyes: Negative. Negative for visual disturbance. Respiratory: Negative for cough, chest tightness, shortness of breath, wheezing and stridor. Cardiovascular: Negative for chest pain and palpitations. Gastrointestinal: Negative for abdominal pain, constipation, diarrhea, nausea and vomiting. Endocrine:        Hypothyroid   Genitourinary: Negative for dysuria and frequency. Hx BPH   Musculoskeletal: Positive for back pain (chronic low back pain). Negative for myalgias and neck pain. Skin: Negative. Allergic/Immunologic: Negative. Neurological: Negative. Negative for seizures and headaches. Hematological: Negative. Psychiatric/Behavioral: Negative. Vitals: There were no vitals taken for this visit. Physical Exam  Constitutional:       Appearance: He is well-developed. HENT:      Head:      Comments: Deferred to Dr. Luis Angel Davis. Ears:      Comments: Deferred to Dr. Luis Angel Davis. Nose:      Comments: Deferred to Dr. Luis Angel Davis. Mouth/Throat:      Comments: Deferred to Dr. Luis Angel Davis. Eyes:      General: No scleral icterus. Extraocular Movements: Extraocular movements intact. Conjunctiva/sclera: Conjunctivae normal.      Pupils: Pupils are equal, round, and reactive to light. Neck:      Thyroid: No thyromegaly. Trachea: No tracheal deviation. Comments: Deferred to Dr. Shahriar Weller. Cardiovascular:      Rate and Rhythm: Normal rate and regular rhythm. Heart sounds: Normal heart sounds. Pulmonary:      Effort: Pulmonary effort is normal. No respiratory distress. Breath sounds: Normal breath sounds. No wheezing or rales. Abdominal:      General: Bowel sounds are normal. There is no distension. Palpations: Abdomen is soft. There is no mass. Tenderness: There is no abdominal tenderness. Genitourinary:     Comments: Deferred. Musculoskeletal:         General: No tenderness. Normal range of motion. Right lower leg: No edema. Left lower leg: No edema. Skin:     General: Skin is warm. Findings: No erythema or rash. Neurological:      Mental Status: He is alert and oriented to person, place, and time. Gait: Gait normal.   Psychiatric:         Mood and Affect: Mood normal.         Behavior: Behavior normal.         Thought Content: Thought content normal.         Judgment: Judgment normal.         [unfilled]    Assessment:  Patient Active Problem List   Diagnosis    Benign non-nodular prostatic hyperplasia without lower urinary tract symptoms    Acquired hypothyroidism    Chronic bilateral low back pain    Hypertriglyceridemia    History of 2019 novel coronavirus disease (COVID-19)    Mass of right side of neck    Sinusitis         Plan:  Scheduled for right neck biopsy.     YUE Mantilla - CNP  11/16/2021  8:29 AM

## 2021-11-22 ENCOUNTER — ANESTHESIA EVENT (OUTPATIENT)
Dept: OPERATING ROOM | Age: 51
End: 2021-11-22
Payer: COMMERCIAL

## 2021-11-23 ENCOUNTER — ANESTHESIA (OUTPATIENT)
Dept: OPERATING ROOM | Age: 51
End: 2021-11-23
Payer: COMMERCIAL

## 2021-11-23 ENCOUNTER — HOSPITAL ENCOUNTER (OUTPATIENT)
Age: 51
Setting detail: OUTPATIENT SURGERY
Discharge: HOME | End: 2021-11-23
Attending: OTOLARYNGOLOGY | Admitting: OTOLARYNGOLOGY
Payer: COMMERCIAL

## 2021-11-23 VITALS — DIASTOLIC BLOOD PRESSURE: 59 MMHG | TEMPERATURE: 99.1 F | OXYGEN SATURATION: 98 % | SYSTOLIC BLOOD PRESSURE: 102 MMHG

## 2021-11-23 VITALS
RESPIRATION RATE: 16 BRPM | OXYGEN SATURATION: 98 % | SYSTOLIC BLOOD PRESSURE: 140 MMHG | WEIGHT: 185 LBS | TEMPERATURE: 97 F | HEART RATE: 72 BPM | BODY MASS INDEX: 25.9 KG/M2 | HEIGHT: 71 IN | DIASTOLIC BLOOD PRESSURE: 84 MMHG

## 2021-11-23 DIAGNOSIS — R22.1 MASS OF RIGHT SIDE OF NECK: Primary | ICD-10-CM

## 2021-11-23 PROCEDURE — 3600000012 HC SURGERY LEVEL 2 ADDTL 15MIN: Performed by: OTOLARYNGOLOGY

## 2021-11-23 PROCEDURE — 2500000003 HC RX 250 WO HCPCS: Performed by: NURSE ANESTHETIST, CERTIFIED REGISTERED

## 2021-11-23 PROCEDURE — 6360000002 HC RX W HCPCS: Performed by: STUDENT IN AN ORGANIZED HEALTH CARE EDUCATION/TRAINING PROGRAM

## 2021-11-23 PROCEDURE — 3600000002 HC SURGERY LEVEL 2 BASE: Performed by: OTOLARYNGOLOGY

## 2021-11-23 PROCEDURE — 3700000000 HC ANESTHESIA ATTENDED CARE: Performed by: OTOLARYNGOLOGY

## 2021-11-23 PROCEDURE — 7100000000 HC PACU RECOVERY - FIRST 15 MIN: Performed by: OTOLARYNGOLOGY

## 2021-11-23 PROCEDURE — 6370000000 HC RX 637 (ALT 250 FOR IP): Performed by: STUDENT IN AN ORGANIZED HEALTH CARE EDUCATION/TRAINING PROGRAM

## 2021-11-23 PROCEDURE — 6360000002 HC RX W HCPCS: Performed by: NURSE ANESTHETIST, CERTIFIED REGISTERED

## 2021-11-23 PROCEDURE — 6360000002 HC RX W HCPCS: Performed by: OTOLARYNGOLOGY

## 2021-11-23 PROCEDURE — 88341 IMHCHEM/IMCYTCHM EA ADD ANTB: CPT

## 2021-11-23 PROCEDURE — 2580000003 HC RX 258: Performed by: STUDENT IN AN ORGANIZED HEALTH CARE EDUCATION/TRAINING PROGRAM

## 2021-11-23 PROCEDURE — 2580000003 HC RX 258: Performed by: OTOLARYNGOLOGY

## 2021-11-23 PROCEDURE — 7100000010 HC PHASE II RECOVERY - FIRST 15 MIN: Performed by: OTOLARYNGOLOGY

## 2021-11-23 PROCEDURE — 7100000011 HC PHASE II RECOVERY - ADDTL 15 MIN: Performed by: OTOLARYNGOLOGY

## 2021-11-23 PROCEDURE — 2580000003 HC RX 258: Performed by: NURSE ANESTHETIST, CERTIFIED REGISTERED

## 2021-11-23 PROCEDURE — 2709999900 HC NON-CHARGEABLE SUPPLY: Performed by: OTOLARYNGOLOGY

## 2021-11-23 PROCEDURE — 3700000001 HC ADD 15 MINUTES (ANESTHESIA): Performed by: OTOLARYNGOLOGY

## 2021-11-23 PROCEDURE — 7100000001 HC PACU RECOVERY - ADDTL 15 MIN: Performed by: OTOLARYNGOLOGY

## 2021-11-23 PROCEDURE — 6370000000 HC RX 637 (ALT 250 FOR IP): Performed by: OTOLARYNGOLOGY

## 2021-11-23 PROCEDURE — 2500000003 HC RX 250 WO HCPCS: Performed by: OTOLARYNGOLOGY

## 2021-11-23 PROCEDURE — 88342 IMHCHEM/IMCYTCHM 1ST ANTB: CPT

## 2021-11-23 PROCEDURE — 88305 TISSUE EXAM BY PATHOLOGIST: CPT

## 2021-11-23 RX ORDER — SODIUM CHLORIDE 9 MG/ML
25 INJECTION, SOLUTION INTRAVENOUS PRN
Status: DISCONTINUED | OUTPATIENT
Start: 2021-11-23 | End: 2021-11-23 | Stop reason: HOSPADM

## 2021-11-23 RX ORDER — LIDOCAINE HYDROCHLORIDE 10 MG/ML
1 INJECTION, SOLUTION EPIDURAL; INFILTRATION; INTRACAUDAL; PERINEURAL
Status: DISCONTINUED | OUTPATIENT
Start: 2021-11-23 | End: 2021-11-23 | Stop reason: HOSPADM

## 2021-11-23 RX ORDER — FENTANYL CITRATE 50 UG/ML
50 INJECTION, SOLUTION INTRAMUSCULAR; INTRAVENOUS EVERY 10 MIN PRN
Status: DISCONTINUED | OUTPATIENT
Start: 2021-11-23 | End: 2021-11-23 | Stop reason: HOSPADM

## 2021-11-23 RX ORDER — PROPOFOL 10 MG/ML
INJECTION, EMULSION INTRAVENOUS PRN
Status: DISCONTINUED | OUTPATIENT
Start: 2021-11-23 | End: 2021-11-23 | Stop reason: SDUPTHER

## 2021-11-23 RX ORDER — ROCURONIUM BROMIDE 10 MG/ML
INJECTION, SOLUTION INTRAVENOUS PRN
Status: DISCONTINUED | OUTPATIENT
Start: 2021-11-23 | End: 2021-11-23 | Stop reason: SDUPTHER

## 2021-11-23 RX ORDER — SODIUM CHLORIDE 0.9 % (FLUSH) 0.9 %
10 SYRINGE (ML) INJECTION PRN
Status: DISCONTINUED | OUTPATIENT
Start: 2021-11-23 | End: 2021-11-23 | Stop reason: HOSPADM

## 2021-11-23 RX ORDER — DEXAMETHASONE SODIUM PHOSPHATE 10 MG/ML
INJECTION INTRAMUSCULAR; INTRAVENOUS PRN
Status: DISCONTINUED | OUTPATIENT
Start: 2021-11-23 | End: 2021-11-23 | Stop reason: SDUPTHER

## 2021-11-23 RX ORDER — SODIUM CHLORIDE 0.9 % (FLUSH) 0.9 %
10 SYRINGE (ML) INJECTION EVERY 12 HOURS SCHEDULED
Status: DISCONTINUED | OUTPATIENT
Start: 2021-11-23 | End: 2021-11-23 | Stop reason: HOSPADM

## 2021-11-23 RX ORDER — LIDOCAINE HYDROCHLORIDE 20 MG/ML
INJECTION, SOLUTION INTRAVENOUS PRN
Status: DISCONTINUED | OUTPATIENT
Start: 2021-11-23 | End: 2021-11-23 | Stop reason: SDUPTHER

## 2021-11-23 RX ORDER — SODIUM CHLORIDE 0.9 % (FLUSH) 0.9 %
5-40 SYRINGE (ML) INJECTION PRN
Status: DISCONTINUED | OUTPATIENT
Start: 2021-11-23 | End: 2021-11-23 | Stop reason: HOSPADM

## 2021-11-23 RX ORDER — SODIUM CHLORIDE, SODIUM LACTATE, POTASSIUM CHLORIDE, CALCIUM CHLORIDE 600; 310; 30; 20 MG/100ML; MG/100ML; MG/100ML; MG/100ML
INJECTION, SOLUTION INTRAVENOUS CONTINUOUS
Status: DISCONTINUED | OUTPATIENT
Start: 2021-11-23 | End: 2021-11-23 | Stop reason: HOSPADM

## 2021-11-23 RX ORDER — METOCLOPRAMIDE HYDROCHLORIDE 5 MG/ML
10 INJECTION INTRAMUSCULAR; INTRAVENOUS
Status: COMPLETED | OUTPATIENT
Start: 2021-11-23 | End: 2021-11-23

## 2021-11-23 RX ORDER — SODIUM CHLORIDE, SODIUM LACTATE, POTASSIUM CHLORIDE, CALCIUM CHLORIDE 600; 310; 30; 20 MG/100ML; MG/100ML; MG/100ML; MG/100ML
INJECTION, SOLUTION INTRAVENOUS CONTINUOUS PRN
Status: DISCONTINUED | OUTPATIENT
Start: 2021-11-23 | End: 2021-11-23 | Stop reason: SDUPTHER

## 2021-11-23 RX ORDER — LIDOCAINE HYDROCHLORIDE AND EPINEPHRINE 10; 10 MG/ML; UG/ML
INJECTION, SOLUTION INFILTRATION; PERINEURAL PRN
Status: DISCONTINUED | OUTPATIENT
Start: 2021-11-23 | End: 2021-11-23 | Stop reason: ALTCHOICE

## 2021-11-23 RX ORDER — MEPERIDINE HYDROCHLORIDE 25 MG/ML
12.5 INJECTION INTRAMUSCULAR; INTRAVENOUS; SUBCUTANEOUS EVERY 5 MIN PRN
Status: DISCONTINUED | OUTPATIENT
Start: 2021-11-23 | End: 2021-11-23 | Stop reason: HOSPADM

## 2021-11-23 RX ORDER — ONDANSETRON 2 MG/ML
INJECTION INTRAMUSCULAR; INTRAVENOUS PRN
Status: DISCONTINUED | OUTPATIENT
Start: 2021-11-23 | End: 2021-11-23 | Stop reason: SDUPTHER

## 2021-11-23 RX ORDER — FENTANYL CITRATE 50 UG/ML
INJECTION, SOLUTION INTRAMUSCULAR; INTRAVENOUS PRN
Status: DISCONTINUED | OUTPATIENT
Start: 2021-11-23 | End: 2021-11-23 | Stop reason: SDUPTHER

## 2021-11-23 RX ORDER — MIDAZOLAM HYDROCHLORIDE 1 MG/ML
INJECTION INTRAMUSCULAR; INTRAVENOUS PRN
Status: DISCONTINUED | OUTPATIENT
Start: 2021-11-23 | End: 2021-11-23 | Stop reason: SDUPTHER

## 2021-11-23 RX ORDER — DIPHENHYDRAMINE HYDROCHLORIDE 50 MG/ML
12.5 INJECTION INTRAMUSCULAR; INTRAVENOUS
Status: DISCONTINUED | OUTPATIENT
Start: 2021-11-23 | End: 2021-11-23 | Stop reason: HOSPADM

## 2021-11-23 RX ORDER — SCOLOPAMINE TRANSDERMAL SYSTEM 1 MG/1
1 PATCH, EXTENDED RELEASE TRANSDERMAL
Status: DISCONTINUED | OUTPATIENT
Start: 2021-11-23 | End: 2021-11-23 | Stop reason: HOSPADM

## 2021-11-23 RX ORDER — HYDROCODONE BITARTRATE AND ACETAMINOPHEN 5; 325 MG/1; MG/1
1 TABLET ORAL EVERY 4 HOURS PRN
Status: DISCONTINUED | OUTPATIENT
Start: 2021-11-23 | End: 2021-11-23 | Stop reason: HOSPADM

## 2021-11-23 RX ORDER — MAGNESIUM HYDROXIDE 1200 MG/15ML
LIQUID ORAL CONTINUOUS PRN
Status: COMPLETED | OUTPATIENT
Start: 2021-11-23 | End: 2021-11-23

## 2021-11-23 RX ORDER — ONDANSETRON 2 MG/ML
4 INJECTION INTRAMUSCULAR; INTRAVENOUS
Status: COMPLETED | OUTPATIENT
Start: 2021-11-23 | End: 2021-11-23

## 2021-11-23 RX ORDER — HYDROCODONE BITARTRATE AND ACETAMINOPHEN 5; 325 MG/1; MG/1
1 TABLET ORAL EVERY 4 HOURS PRN
Qty: 30 TABLET | Refills: 0 | Status: SHIPPED | OUTPATIENT
Start: 2021-11-23 | End: 2021-11-30

## 2021-11-23 RX ORDER — HYDROCODONE BITARTRATE AND ACETAMINOPHEN 5; 325 MG/1; MG/1
2 TABLET ORAL PRN
Status: COMPLETED | OUTPATIENT
Start: 2021-11-23 | End: 2021-11-23

## 2021-11-23 RX ORDER — WOUND DRESSING ADHESIVE - LIQUID
LIQUID MISCELLANEOUS PRN
Status: DISCONTINUED | OUTPATIENT
Start: 2021-11-23 | End: 2021-11-23 | Stop reason: ALTCHOICE

## 2021-11-23 RX ORDER — SODIUM CHLORIDE 0.9 % (FLUSH) 0.9 %
5-40 SYRINGE (ML) INJECTION EVERY 12 HOURS SCHEDULED
Status: DISCONTINUED | OUTPATIENT
Start: 2021-11-23 | End: 2021-11-23 | Stop reason: HOSPADM

## 2021-11-23 RX ORDER — HYDROCODONE BITARTRATE AND ACETAMINOPHEN 5; 325 MG/1; MG/1
1 TABLET ORAL PRN
Status: COMPLETED | OUTPATIENT
Start: 2021-11-23 | End: 2021-11-23

## 2021-11-23 RX ADMIN — ROCURONIUM BROMIDE 50 MG: 10 INJECTION INTRAVENOUS at 10:26

## 2021-11-23 RX ADMIN — CEFAZOLIN SODIUM 2000 MG: 10 INJECTION, POWDER, FOR SOLUTION INTRAVENOUS at 10:31

## 2021-11-23 RX ADMIN — FENTANYL CITRATE 50 MCG: 50 INJECTION, SOLUTION INTRAMUSCULAR; INTRAVENOUS at 12:05

## 2021-11-23 RX ADMIN — PHENYLEPHRINE HYDROCHLORIDE 50 MCG: 10 INJECTION INTRAVENOUS at 11:32

## 2021-11-23 RX ADMIN — FENTANYL CITRATE 25 MCG: 50 INJECTION, SOLUTION INTRAMUSCULAR; INTRAVENOUS at 12:00

## 2021-11-23 RX ADMIN — PHENYLEPHRINE HYDROCHLORIDE 100 MCG: 10 INJECTION INTRAVENOUS at 11:40

## 2021-11-23 RX ADMIN — LIDOCAINE HYDROCHLORIDE 60 MG: 20 INJECTION, SOLUTION INTRAVENOUS at 10:26

## 2021-11-23 RX ADMIN — SODIUM CHLORIDE, POTASSIUM CHLORIDE, SODIUM LACTATE AND CALCIUM CHLORIDE: 600; 310; 30; 20 INJECTION, SOLUTION INTRAVENOUS at 12:59

## 2021-11-23 RX ADMIN — SUGAMMADEX 200 MG: 100 INJECTION, SOLUTION INTRAVENOUS at 11:53

## 2021-11-23 RX ADMIN — SODIUM CHLORIDE, POTASSIUM CHLORIDE, SODIUM LACTATE AND CALCIUM CHLORIDE: 600; 310; 30; 20 INJECTION, SOLUTION INTRAVENOUS at 11:38

## 2021-11-23 RX ADMIN — MIDAZOLAM HYDROCHLORIDE 2 MG: 2 INJECTION, SOLUTION INTRAMUSCULAR; INTRAVENOUS at 10:18

## 2021-11-23 RX ADMIN — PHENYLEPHRINE HYDROCHLORIDE 100 MCG: 10 INJECTION INTRAVENOUS at 11:46

## 2021-11-23 RX ADMIN — FENTANYL CITRATE 25 MCG: 50 INJECTION, SOLUTION INTRAMUSCULAR; INTRAVENOUS at 11:55

## 2021-11-23 RX ADMIN — ONDANSETRON 4 MG: 2 INJECTION INTRAMUSCULAR; INTRAVENOUS at 12:48

## 2021-11-23 RX ADMIN — FENTANYL CITRATE 50 MCG: 50 INJECTION, SOLUTION INTRAMUSCULAR; INTRAVENOUS at 10:34

## 2021-11-23 RX ADMIN — METOCLOPRAMIDE 10 MG: 5 INJECTION, SOLUTION INTRAMUSCULAR; INTRAVENOUS at 13:25

## 2021-11-23 RX ADMIN — PHENYLEPHRINE HYDROCHLORIDE 50 MCG: 10 INJECTION INTRAVENOUS at 11:34

## 2021-11-23 RX ADMIN — HYDROCODONE BITARTRATE AND ACETAMINOPHEN 1 TABLET: 5; 325 TABLET ORAL at 13:34

## 2021-11-23 RX ADMIN — ONDANSETRON 4 MG: 2 INJECTION INTRAMUSCULAR; INTRAVENOUS at 11:47

## 2021-11-23 RX ADMIN — DEXAMETHASONE SODIUM PHOSPHATE 10 MG: 10 INJECTION INTRAMUSCULAR; INTRAVENOUS at 10:33

## 2021-11-23 RX ADMIN — PROPOFOL 200 MG: 10 INJECTION, EMULSION INTRAVENOUS at 10:26

## 2021-11-23 RX ADMIN — FENTANYL CITRATE 50 MCG: 50 INJECTION, SOLUTION INTRAMUSCULAR; INTRAVENOUS at 10:26

## 2021-11-23 RX ADMIN — SODIUM CHLORIDE, POTASSIUM CHLORIDE, SODIUM LACTATE AND CALCIUM CHLORIDE: 600; 310; 30; 20 INJECTION, SOLUTION INTRAVENOUS at 10:21

## 2021-11-23 ASSESSMENT — PULMONARY FUNCTION TESTS
PIF_VALUE: 20
PIF_VALUE: 19
PIF_VALUE: 21
PIF_VALUE: 19
PIF_VALUE: 15
PIF_VALUE: 2
PIF_VALUE: 19
PIF_VALUE: 20
PIF_VALUE: 19
PIF_VALUE: 2
PIF_VALUE: 20
PIF_VALUE: 18
PIF_VALUE: 19
PIF_VALUE: 17
PIF_VALUE: 3
PIF_VALUE: 20
PIF_VALUE: 19
PIF_VALUE: 15
PIF_VALUE: 19
PIF_VALUE: 19
PIF_VALUE: 18
PIF_VALUE: 20
PIF_VALUE: 25
PIF_VALUE: 15
PIF_VALUE: 19
PIF_VALUE: 16
PIF_VALUE: 16
PIF_VALUE: 3
PIF_VALUE: 19
PIF_VALUE: 20
PIF_VALUE: 20
PIF_VALUE: 15
PIF_VALUE: 19
PIF_VALUE: 20
PIF_VALUE: 22
PIF_VALUE: 18
PIF_VALUE: 20
PIF_VALUE: 1
PIF_VALUE: 20
PIF_VALUE: 18
PIF_VALUE: 19
PIF_VALUE: 17
PIF_VALUE: 3
PIF_VALUE: 18
PIF_VALUE: 23
PIF_VALUE: 19
PIF_VALUE: 20
PIF_VALUE: 20
PIF_VALUE: 19
PIF_VALUE: 20
PIF_VALUE: 20
PIF_VALUE: 19
PIF_VALUE: 20
PIF_VALUE: 19
PIF_VALUE: 19
PIF_VALUE: 22
PIF_VALUE: 20
PIF_VALUE: 4
PIF_VALUE: 20
PIF_VALUE: 17
PIF_VALUE: 18
PIF_VALUE: 19
PIF_VALUE: 20
PIF_VALUE: 19
PIF_VALUE: 19
PIF_VALUE: 20
PIF_VALUE: 20
PIF_VALUE: 12
PIF_VALUE: 20
PIF_VALUE: 0
PIF_VALUE: 19
PIF_VALUE: 0
PIF_VALUE: 19
PIF_VALUE: 18
PIF_VALUE: 20
PIF_VALUE: 20
PIF_VALUE: 23
PIF_VALUE: 20
PIF_VALUE: 20
PIF_VALUE: 18
PIF_VALUE: 20
PIF_VALUE: 20
PIF_VALUE: 19
PIF_VALUE: 20
PIF_VALUE: 17
PIF_VALUE: 19
PIF_VALUE: 18
PIF_VALUE: 20
PIF_VALUE: 19
PIF_VALUE: 20
PIF_VALUE: 19
PIF_VALUE: 20
PIF_VALUE: 8
PIF_VALUE: 19

## 2021-11-23 ASSESSMENT — PAIN SCALES - GENERAL
PAINLEVEL_OUTOF10: 4
PAINLEVEL_OUTOF10: 6

## 2021-11-23 ASSESSMENT — PAIN DESCRIPTION - PAIN TYPE: TYPE: SURGICAL PAIN

## 2021-11-23 ASSESSMENT — PAIN - FUNCTIONAL ASSESSMENT: PAIN_FUNCTIONAL_ASSESSMENT: 0-10

## 2021-11-23 ASSESSMENT — PAIN DESCRIPTION - DESCRIPTORS: DESCRIPTORS: SORE

## 2021-11-23 NOTE — ANESTHESIA PRE PROCEDURE
Department of Anesthesiology  Preprocedure Note       Name:  Erlinda Bruce   Age:  46 y.o.  :  1970                                          MRN:  [id number]         Date:  2021      Surgeon: William Christianson): Iam Cotto MD    Procedure: Procedure(s):  RIGHT NECK BIOPSY. 2 HOURS    Medications prior to admission:   Prior to Admission medications    Medication Sig Start Date End Date Taking? Authorizing Provider   Multiple Vitamins-Minerals (THERAPEUTIC MULTIVITAMIN-MINERALS) tablet Take 1 tablet by mouth daily   Yes Historical Provider, MD   levothyroxine (SYNTHROID) 137 MCG tablet Take 1 tablet by mouth Daily 21  Yes Emily Arzola MD   ibuprofen (ADVIL;MOTRIN) 200 MG tablet Take 200 mg by mouth every 6 hours as needed for Pain    Historical Provider, MD   ipratropium (ATROVENT) 0.06 % nasal spray 2 sprays by Each Nostril route 3 times daily 10/7/21   Emily Arzola MD   diphenhydrAMINE (BENADRYL) 25 MG capsule Take 2 capsules by mouth every 4 hours as needed for Itching (swelling) 9/10/21   Hiren Raman MD   acetaminophen (TYLENOL) 325 MG tablet Take 325 mg by mouth every 6 hours as needed. OTC       Historical Provider, MD       Current medications:    Current Facility-Administered Medications   Medication Dose Route Frequency Provider Last Rate Last Admin    0.9 % sodium chloride infusion  25 mL IntraVENous PRN Karthikeyan Gee, APRN - CNP        lactated ringers infusion   IntraVENous Continuous Karthikeyan Gee, APRN - CNP        lidocaine PF 1 % injection 1 mL  1 mL IntraDERmal Once PRN Karthikeyan Gee, APRN - CNP        sodium chloride flush 0.9 % injection 10 mL  10 mL IntraVENous 2 times per day Karthikeyan Gee, APRN - CNP        sodium chloride flush 0.9 % injection 10 mL  10 mL IntraVENous PRN Karthikeyan Gee, APRN - CNP           Allergies:     Allergies   Allergen Reactions    Iv Dye [Iodides] Anaphylaxis    Codeine Nausea And Vomiting       Problem List: Patient Active Problem List   Diagnosis Code    Benign non-nodular prostatic hyperplasia without lower urinary tract symptoms N40.0    Acquired hypothyroidism E03.9    Chronic bilateral low back pain M54.50, G89.29    Hypertriglyceridemia E78.1    History of 2019 novel coronavirus disease (COVID-19) Z86.16    Mass of right side of neck R22.1    Sinusitis J32.9       Past Medical History:        Diagnosis Date    Acquired hypothyroidism 2017    meds > 10 yrs    Anxiety     Benign non-nodular prostatic hyperplasia without lower urinary tract symptoms 3/3/2016    Chronic bilateral low back pain 3/29/2019    History of 2019 novel coronavirus disease (COVID-19) 9/10/2021    Hypertriglyceridemia 2020    high triglycerides -- diet control    Leukopenia 2012    PONV (postoperative nausea and vomiting)     severe n/v with choli       Past Surgical History:        Procedure Laterality Date    CHOLECYSTECTOMY, LAPAROSCOPIC      COLONOSCOPY  2018    int hemorr, 10y repeat (DR Karina Duval)    ENDOSCOPY, COLON, DIAGNOSTIC      UPPER GASTROINTESTINAL ENDOSCOPY      gastritis    VASECTOMY         Social History:    Social History     Tobacco Use    Smoking status: Former Smoker     Packs/day: 0.30     Years: 5.00     Pack years: 1.50     Types: Cigarettes     Start date:      Quit date: 2003     Years since quittin.9    Smokeless tobacco: Never Used   Substance Use Topics    Alcohol use: Yes     Comment: social                                Counseling given: Not Answered      Vital Signs (Current):   Vitals:    21 0804   BP: (!) 148/75   Pulse: 75   Resp: 16   Temp: 97.1 °F (36.2 °C)   TempSrc: Temporal   SpO2: 99%   Weight: 185 lb (83.9 kg)   Height: 5' 11\" (1.803 m)                                              BP Readings from Last 3 Encounters:   21 (!) 148/75   21 (!) 155/87   09/10/21 (!) 147/94       NPO Status: Time of last liquid consumption: 2300                        Time of last solid consumption: 2300                        Date of last liquid consumption: 11/22/21                        Date of last solid food consumption: 11/22/21    BMI:   Wt Readings from Last 3 Encounters:   11/23/21 185 lb (83.9 kg)   11/16/21 200 lb (90.7 kg)   09/10/21 190 lb (86.2 kg)     Body mass index is 25.8 kg/m². CBC:   Lab Results   Component Value Date    WBC 5.3 11/16/2021    RBC 4.27 11/16/2021    RBC 4.48 05/01/2012    HGB 13.3 11/16/2021    HCT 39.0 11/16/2021    MCV 91.5 11/16/2021    RDW 13.7 11/16/2021     11/16/2021       CMP:   Lab Results   Component Value Date     11/16/2021    K 4.0 11/16/2021     11/16/2021    CO2 26 11/16/2021    BUN 19 11/16/2021    CREATININE 1.18 11/16/2021    GFRAA >60.0 11/16/2021    LABGLOM >60.0 11/16/2021    GLUCOSE 97 11/16/2021    GLUCOSE 93 03/19/2012    PROT 6.9 09/07/2021    CALCIUM 9.2 11/16/2021    BILITOT 0.4 09/07/2021    ALKPHOS 65 09/07/2021    AST 29 09/07/2021    ALT 21 09/07/2021       POC Tests: No results for input(s): POCGLU, POCNA, POCK, POCCL, POCBUN, POCHEMO, POCHCT in the last 72 hours. Coags: No results found for: PROTIME, INR, APTT    HCG (If Applicable): No results found for: PREGTESTUR, PREGSERUM, HCG, HCGQUANT     ABGs: No results found for: PHART, PO2ART, CLZ9VZR, UIR8OER, BEART, N7UGCOHV     Type & Screen (If Applicable):  No results found for: LABABO, LABRH    Drug/Infectious Status (If Applicable):  No results found for: HIV, HEPCAB    COVID-19 Screening (If Applicable): No results found for: COVID19        Anesthesia Evaluation  Patient summary reviewed and Nursing notes reviewed   history of anesthetic complications: PONV.   Airway: Mallampati: II  TM distance: >3 FB   Neck ROM: full  Mouth opening: > = 3 FB Dental: normal exam         Pulmonary:Negative Pulmonary ROS and normal exam                               Cardiovascular:Negative CV ROS  Exercise tolerance: good (>4 METS),         ECG reviewed               Beta Blocker:  Not on Beta Blocker      ROS comment: Normal sinus rhythm  Normal ECG  No previous ECGs available     Neuro/Psych:   Negative Neuro/Psych ROS              GI/Hepatic/Renal: Neg GI/Hepatic/Renal ROS            Endo/Other: Negative Endo/Other ROS   (+) hypothyroidism::., .          Pt had PAT visit. Abdominal:             Vascular: negative vascular ROS. Other Findings:             Anesthesia Plan      general     ASA 2     (ETT)  Induction: intravenous. MIPS: Postoperative opioids intended and Prophylactic antiemetics administered. Anesthetic plan and risks discussed with patient. Plan discussed with CRNA.     Attending anesthesiologist reviewed and agrees with Esteban Kenny DO   11/23/2021

## 2021-11-23 NOTE — BRIEF OP NOTE
Brief Postoperative Note      Patient: Hortencia Paniagua  YOB: 1970  MRN: [id number]    Date of Procedure: 11/23/2021    Pre-Op Diagnosis: RIGHT NECK MASS    Post-Op Diagnosis: Same       Procedure(s):  REMOVAL OF RIGHT NECK MASS VIA DEEP JUGULAR NODE DISSECTION    Surgeon(s):   Yang Jeter MD    Assistant:  First Assistant: Stephen Chiu    Anesthesia: General    Estimated Blood Loss (mL): Minimal    Complications: None    Specimens:   ID Type Source Tests Collected by Time Destination   A : neck mass Tissue Neck SURGICAL PATHOLOGY Yang Jeter MD 11/23/2021 1133        Implants:  * No implants in log *      Drains: * No LDAs found *    Findings: large jovanny mass removed    Electronically signed by Yang Jeter MD on 11/23/2021 at 11:54 AM

## 2021-11-23 NOTE — OP NOTE
[address]                      [address], [address] [facility]                                OPERATIVE REPORT    PATIENT NAME: Fer Berkowitz                     :        1970  MED REC NO:   [id number]                            ROOM:  ACCOUNT NO:   [de-identified]                           ADMIT DATE: 2021  PROVIDER:     Regina Rangel MD    DATE OF PROCEDURE:  2021    DIAGNOSIS:  Right neck mass. OPERATION:  Deep jugular node dissection with removal of right jovanny  mass conglomerate. SURGEON:  Regina Rangel MD    ANESTHESIA:  General.    INDICATION:  The patient is a 78-year-old male with a prominent mass  involving the right neck. CT scan confirms this as well as an enlarged  goiter, but no distinct thyroid mass per se and subsequent thyroid  ultrasound does not confirm any evidence of mass either. There is no  prominent nodule present within the thyroid. The patient has had  atypical cells noted on fine-needle aspiration and it has failed  aggressive medical management with recommendation for definitive  resection. OPERATIVE PROCEDURE:  The patient was taken to the operating room and  administered general anesthesia. Appropriate prep, drape, and time-out  were performed in the usual manner. A horizontal incision directly  overlying the mass following natural skin crease and roughly two  fingerbreadths above the clavicle was marked out and the skin  infiltrated with lidocaine 1%, epinephrine 1:100,000. Incision was  carried down through skin and platysma. Subplatysmal flaps were  elevated superiorly and inferiorly. As the mass itself was directly  deep to the sternocleidomastoid muscle, rather than dissect far anterior  and then retracting, I actually opted to vertically go through the  sternocleidomastoid muscle preserving the integrity of the muscle  otherwise in its entirety.   Dissection was carried down through the  musculature to identify the deeper fascial level. Meticulous dissection  was performed to circumferentially dissect out a prominent 3+ cm jovanny  mass incorporating 2 or 3 other nodes superimposed. These were  dissected accordingly with clear identification and preservation of the  internal jugular vein with a masses located posterior and lateral to  same. That jovanny mass was taken out as completely and safely as  possible with dramatic improvement in the appearance of this level. I  did not feel any other worrisome lymph node structures at this time. Therefore, after copiously irrigating this wound with saline, any  bleeding was controlled along the way with judicious use of bipolar  cautery. The patient's specimen was sent for permanent section. The  patient had the vertical delineation of the sternocleidomastoid muscle  closed with interrupted Vicryl suture. The deeper fascial level was  also closed with Vicryl. The patient had the superficial fascia closed  with Vicryl. The skin was closed first by buried subcutaneous Vicryl,  closing the platysma in that particular layer. Finally, the patient had  a running subcuticular suture placed with application of Benzoin and  Steri-Strips. He was released and taken to Recovery in stable  condition. Estimated blood loss minimal.  No complications. Chaparro Rivas MD    D: 11/23/2021 13:02:01       T: 11/23/2021 13:04:35     [email or web address]  Job#: [id number]     Doc#: [id number]    CC:   Rach Montes MD

## 2021-11-23 NOTE — ANESTHESIA POSTPROCEDURE EVALUATION
Department of Anesthesiology  Postprocedure Note    Patient: Junior Ocampo  MRN: [id number]  YOB: 1970  Date of evaluation: 11/23/2021  Time:  12:08 PM     Procedure Summary     Date: 11/23/21 Room / Location: [address] [place] [address]    Anesthesia Start: 0709 Anesthesia Stop: 8340    Procedure: REMOVAL OF RIGHT NECK MASS (Right ) Diagnosis: (RIGHT NECK MASS)    Surgeons: Valerie Olsen MD Responsible Provider: Leonel Cortes MD    Anesthesia Type: general ASA Status: 2          Anesthesia Type: general    Rogelio Phase I: Rogelio Score: 10    Rogelio Phase II:      Last vitals: Reviewed and per EMR flowsheets.        Anesthesia Post Evaluation    Patient location during evaluation: PACU  Patient participation: complete - patient participated  Level of consciousness: awake  Pain score: 0  Airway patency: patent  Nausea & Vomiting: no nausea and no vomiting  Complications: no  Cardiovascular status: hemodynamically stable  Respiratory status: acceptable  Hydration status: euvolemic

## 2021-11-23 NOTE — PROGRESS NOTES
Received from pacu into short stay. Right neck incision noted intact with steri strips in place. Pt awake and alert, states pain in right neck is \"5\"  And nausea is \"currently slight better\" and patient is tolerating po  Fluids is small amount.

## 2021-11-23 NOTE — PROGRESS NOTES
Assisted up to bathroom gait steady voided without difficulty, Back to cart, Dr Bhaskar Shelton in to see patient.

## 2021-11-23 NOTE — PROGRESS NOTES
"        Tamy Donny   2017 8:40 AM   Office Visit   MRN: [id number]    Department:  [facility]   Dept Phone:  [phone]    Description:  Female : 2001   Provider:  Apple Fenton M.D.           Reason for Visit     Abdominal Pain fv abd pain      Allergies as of 2017     No Known Allergies      You were diagnosed with     Functional abdominal pain syndrome   [[id number]]         Vital Signs     Blood Pressure Pulse Temperature Respirations Height Weight    108/62 mmHg 78 36.6 °C (97.8 °F) 16 1.689 m (5' 6.5\") 63.05 kg (139 lb)    Body Mass Index Oxygen Saturation Smoking Status             22.10 kg/m2 98% Never Smoker          Basic Information     Date Of Birth Sex Race Ethnicity Preferred Language    2001 Female White Non- English      Your appointments     2017  8:30 AM   Non Provider 1 with [place] ALCANT[place]   [facility] [address] [address] [phone]           You will be receiving a confirmation call a few days before your appointment from our automated call confirmation system.              Problem List              ICD-10-CM Priority Class Noted - Resolved    Dysmenorrhea N94.6   3/8/2015 - Present    Irregular menstrual cycle N92.6   3/8/2015 - Present    Asthma, mild intermittent, well-controlled J45.20   2015 - Present    Vitamin D deficiency disease E55.9   2015 - Present    Vomiting and diarrhea R11.10, R19.7   2015 - Present    Abdominal cramps R10.9   2015 - Present    Eczema L30.9   2016 - Present    Gastroesophageal reflux disease with esophagitis K21.0   2016 - Present    Food allergy Z91.018   2016 - Present    Seasonal allergic rhinitis J30.2   2016 - Present    Functional abdominal pain syndrome R10.9   2016 - Present    Cephalalgia R51   2016 - Present    Diarrhea R19.7   2016 - Present    Primary insomnia F51.01   10/19/2016 - " Discharge instructions reviewed, patient verbalized understanding. Able to dress self. Present    Mood changes (CMS-[facility]) F39   10/19/2016 - Present    Viral upper respiratory tract infection J06.9, B97.89   12/16/2016 - Present    Cough R05   12/21/2016 - Present      Health Maintenance        Date Due Completion Dates    IMM HPV VACCINE (1 of 3 - Female 3 Dose Series) 12/29/2010 ---    IMM MENINGOCOCCAL VACCINE (MCV4) (1 of 1) 12/29/2015 ---    IMM INFLUENZA (1) 9/1/2016 ---    IMM DTaP/Tdap/Td Vaccine (7 - Td) 9/6/2021 9/6/2011, 1/21/2004, 6/1/2001, 7/7/2000, 5/12/2000, 3/3/2000            Current Immunizations     Dtap Vaccine 1/21/2004, 6/1/2001, 7/7/2000, 5/12/2000, 3/3/2000    HIB Vaccine (ACTHIB/HIBERIX) 6/1/2001, 7/7/2000, 5/12/2000, 3/3/2000    Hepatitis A Vaccine, Ped/Adol 8/4/2004, 1/21/2004    Hepatitis B Vaccine Non-Recombivax (Ped/Adol) 7/7/2000, 5/12/2000, 3/3/2000    INFLUENZA VACCINE H1N1 11/10/2009    IPV 1/21/2004, 7/7/2000, 5/12/2000, 3/3/2000    MMR Vaccine 1/21/2004, 1/5/2001    Tdap Vaccine 9/6/2011  2:09 PM    Varicella Vaccine Live 9/6/2011  2:10 PM, 1/5/2001      Below and/or attached are the medications your provider expects you to take. Review all of your home medications and newly ordered medications with your provider and/or pharmacist. Follow medication instructions as directed by your provider and/or pharmacist. Please keep your medication list with you and share with your provider. Update the information when medications are discontinued, doses are changed, or new medications (including over-the-counter products) are added; and carry medication information at all times in the event of emergency situations     Allergies:  No Known Allergies          Medications  Valid as of: January 30, 2017 -  9:06 AM    Generic Name Brand Name Tablet Size Instructions for use    Amitriptyline HCl (Tab) ELAVIL 25 MG Take 1 Tab by mouth every bedtime.        Dicyclomine HCl (Cap) BENTYL 10 MG Take 1 Cap by mouth 4 Times a Day,Before Meals and at Bedtime.        Diphenoxylate-Atropine  (Tab) LOMOTIL 2.5-0.025 MG Take 1 Tab by mouth 4 times a day as needed for Diarrhea.        Doxycycline Hyclate (Tab) VIBRAMYCIN 100 MG Take 1 Tab by mouth 2 times a day.        HydrOXYzine HCl (Tab) ATARAX 25 MG Take 1-2 Tabs by mouth at bedtime as needed for Itching or Anxiety.        Ibuprofen (Tab) MOTRIN 200 MG Take 400 mg by mouth every 6 hours as needed for Mild Pain.        Levonorgestrel-Ethinyl Estrad (Tab) NORDETTE 0.15-30 MG-MCG Take 1 Tab by mouth every day.        Montelukast Sodium (Tab) SINGULAIR 10 MG Take 1 Tab by mouth every day.        Multiple Vitamins-Minerals (Tab) THERAGRAN-M  Take 1 Tab by mouth every day.        Omeprazole (CAPSULE DELAYED RELEASE) PRILOSEC 20 MG Take 1 Cap by mouth every day.        Ondansetron (TABLET DISPERSIBLE) ZOFRAN ODT 4 MG Take 4 mg by mouth every 8 hours as needed for Nausea/Vomiting.        Ondansetron HCl (Tab) ZOFRAN 4 MG Take 1 Tab by mouth every 8 hours as needed for Nausea/Vomiting.        Topiramate (Tab) TOPAMAX 25 MG Take 25mg at night x 7 days, then increase to 25mg twice per day x 7 days, then increase to 25mg in the am and 50mg at night for 7 days, then 50mg twice per day        TraZODone HCl (Tab) DESYREL 50 MG Take 1-2 Tabs by mouth at bedtime as needed for Sleep.        Triamcinolone Acetonide (Cream) KENALOG 0.1 % Apply to eczema twice per day x 2 weeks on then 2 weeks off        .                 Medicines prescribed today were sent to:     [id number]  [place] [address] [address] [facility] [address]    [address] [place] [address]    Phone: [phone] Fax: [phone]    Open 24 Hours?: No    [id number] - [place] - [address]    [address] [place] [address]    Phone: [phone] Fax: [phone]    Open 24 Hours?: No      Medication refill instructions:       If your prescription bottle indicates you have medication refills left, it is not necessary  to call your provider’s office. Please contact your pharmacy and they will refill your medication.    If your prescription bottle indicates you do not have any refills left, you may request refills at any time through one of the following ways: The online [employer] system (except Urgent Care), by calling your provider’s office, or by asking your pharmacy to contact your provider’s office with a refill request. Medication refills are processed only during regular business hours and may not be available until the next business day. Your provider may request additional information or to have a follow-up visit with you prior to refilling your medication.   *Please Note: Medication refills are assigned a new Rx number when refilled electronically. Your pharmacy may indicate that no refills were authorized even though a new prescription for the same medication is available at the pharmacy. Please request the medicine by name with the pharmacy before contacting your provider for a refill.        Referral     A referral request has been sent to our patient care coordination department. Please allow 3-5 business days for us to process this request and contact you either by phone or mail. If you do not hear from us by the 5th business day, please call us at [phone].        Instructions    1. Dr. Fenton would recommend acupuncture.    2. Follow up in 4 months.            MyChart Status: Patient Declined

## 2021-11-23 NOTE — PROGRESS NOTES
Medicated with Norco dfor complaint of pain in \"right neck\", see MAR. Right neck incision intact with steri strips.

## 2021-12-15 ENCOUNTER — HOSPITAL ENCOUNTER (OUTPATIENT)
Dept: CT IMAGING | Age: 51
Discharge: HOME | End: 2021-12-17
Payer: COMMERCIAL

## 2021-12-15 DIAGNOSIS — C73 MALIGNANT NEOPLASM OF THYROID GLAND (HCC): ICD-10-CM

## 2021-12-15 PROCEDURE — 71250 CT THORAX DX C-: CPT

## 2021-12-15 PROCEDURE — 70490 CT SOFT TISSUE NECK W/O DYE: CPT

## 2021-12-16 PROBLEM — J32.9 SINUSITIS: Status: RESOLVED | Noted: 2021-11-16 | Resolved: 2021-12-16

## 2021-12-27 ENCOUNTER — HOSPITAL ENCOUNTER (OUTPATIENT)
Age: 51
Setting detail: SPECIMEN
Discharge: HOME | End: 2021-12-27
Payer: COMMERCIAL

## 2021-12-27 DIAGNOSIS — C73 MALIGNANT NEOPLASM OF THYROID GLAND (HCC): Primary | ICD-10-CM

## 2021-12-27 LAB — SARS-COV-2, NAAT: NOT DETECTED

## 2021-12-27 PROCEDURE — 87635 SARS-COV-2 COVID-19 AMP PRB: CPT

## 2021-12-28 ENCOUNTER — TELEPHONE (OUTPATIENT)
Dept: FAMILY MEDICINE CLINIC | Age: 51
End: 2021-12-28

## 2021-12-28 NOTE — TELEPHONE ENCOUNTER
[employer] Dr Dawit Weber assistant brought me a paper that had information on it that had to get an prior authorization for a thyroidectomy on navinet for an out of network surgery that he is having on 12/28/2021. I did a Navinet on 12/17/2021 and I had to send information along with the prior authorization on [employer] and then it was pending I checked on 12/27/2021 and it was approved with the authorization number [id number] effective on 12/17/2021 I didn't gete the message until 12/27/2021 I was on vacation last week. I faxed it on 12/27/2021 to [facility] [phone] so Mi Arnold can get this done I have scanned in the media this infromation. cp

## 2022-01-10 ENCOUNTER — OFFICE VISIT (OUTPATIENT)
Dept: ENDOCRINOLOGY | Age: 52
End: 2022-01-10
Payer: COMMERCIAL

## 2022-01-10 VITALS
HEART RATE: 63 BPM | HEIGHT: 71 IN | OXYGEN SATURATION: 98 % | BODY MASS INDEX: 27.72 KG/M2 | WEIGHT: 198 LBS | SYSTOLIC BLOOD PRESSURE: 150 MMHG | DIASTOLIC BLOOD PRESSURE: 91 MMHG

## 2022-01-10 DIAGNOSIS — E89.0 POSTOPERATIVE HYPOTHYROIDISM: ICD-10-CM

## 2022-01-10 DIAGNOSIS — C73 PAPILLARY THYROID CARCINOMA (HCC): Primary | ICD-10-CM

## 2022-01-10 PROCEDURE — 99203 OFFICE O/P NEW LOW 30 MIN: CPT | Performed by: INTERNAL MEDICINE

## 2022-01-10 ASSESSMENT — ENCOUNTER SYMPTOMS
RESPIRATORY NEGATIVE: 1
SWOLLEN GLANDS: 0
VOICE CHANGE: 1

## 2022-01-10 NOTE — PROGRESS NOTES
1/10/2022    Assessment:       Diagnosis Orders   1. Acquired hypothyroidism  TSH with Reflex    T4, Free    Anti-Thyroglobulin Antibody    Thyroglobulin   2. Papillary thyroid carcinoma (HCC)  NM THYROID IMAGING W UPTAKE AND FLOW    NM THYROID METASTASES SCAN WHOLE BODY   3.  Postoperative hypothyroidism           PLAN:     Orders Placed This Encounter   Procedures    NM THYROID IMAGING W UPTAKE AND FLOW     Standing Status:   Future     Standing Expiration Date:   1/10/2023    NM THYROID METASTASES SCAN WHOLE BODY     Standing Status:   Future     Standing Expiration Date:   1/10/2023    TSH with Reflex     Standing Status:   Future     Standing Expiration Date:   1/10/2023    T4, Free     Standing Status:   Future     Standing Expiration Date:   1/10/2023    Anti-Thyroglobulin Antibody     Standing Status:   Future     Standing Expiration Date:   1/10/2023    Thyroglobulin     Standing Status:   Future     Standing Expiration Date:   1/10/2023     We will schedule patient for thyroid and whole-body scan and eventually ablation with high-dose iodine-131 200 mCi  Explained procedures processes to patient  After radioactive iodine ablation patient was started on Synthroid 137 mcg daily  We will repeat thyroglobulin level thyroid function tests in 6 to 8 weeks after patient starts on thyroid replacement  More than 50% of 40 minutes spent patient education counseling        Subjective:     Chief Complaint   Patient presents with    New Patient    Hypothyroidism     cancer     Vitals:    01/10/22 1500 01/10/22 1503   BP: (!) 150/91 (!) 150/91   Pulse: 63    SpO2: 98%    Weight: 198 lb (89.8 kg)    Height: 5' 11\" (1.803 m)      Wt Readings from Last 3 Encounters:   01/10/22 198 lb (89.8 kg)   11/23/21 185 lb (83.9 kg)   11/16/21 200 lb (90.7 kg)     BP Readings from Last 3 Encounters:   01/10/22 (!) 150/91   11/23/21 (!) 102/59   11/23/21 (!) 140/84     Patient referred here after elective total thyroidectomy for papillary thyroid carcinoma with metastatic disease to lymph node and skeletal muscle and also to lungs    Lymph node biopsy showed papillary thyroid carcinoma reviewed initially by Dr. Ayleen Rasheed and then referred to [facility] where he had elective total thyroidectomy with lymph node dissection  Patient has had hoarseness of his voice due to recurrent laryngeal nerve trauma  Called the patient thyroglobulin level was 0.9 a week ago done at [facility] we do not have those to review  Patient was started on Synthroid but he is off of it since beginning of the year does complain of fatigue  Denies any shortness of breath chest pains  Patient did have COVID infection a year ago    Reviewed path report papillary thyroid carcinoma with focal tall  T-cell variant aggressive features with multiple lymph nodes total of 10 positive largest lymph node 2.8 cm    Patient also incidentally has allergic reaction to IVP dye  PATHOLOGIC STAGE CLASSIFICATION (pTNM, AJCC 8th Edition)    pT Category:        pT4a   pN Category:      pN1b     Other  This is a new (Hypothyroidism) problem. The current episode started 1 to 4 weeks ago. The problem occurs constantly. The problem has been waxing and waning. Associated symptoms include fatigue. Pertinent negatives include no anorexia, neck pain or swollen glands. Exacerbated by: Total thyroidectomy papillary thyroid carcinoma. Treatments tried: Synthroid         Comparison: No prior       History: M79.89 Mass of soft tissue of neck ICD10               Technique: CT SOFT TISSUE NECK W WO CONTRAST               Findings: This is study initially show motion artifact. The patient developed emesis after receiving intravenous contrast. The follow-up images were performed approximately 7 minutes after the contrast was administered.       Mucoperiosteal thickening is seen in the right and left maxillary sinuses. The airway is patent.  The submandibular glands are fairly symmetrical as well as the parotid glands.       In the right jugulodigastric region a lymph node is seen that measures 1.7 x 1.4 cm. Cm. This lymph node is at the level of the hyoid bone. Superiorly there are small lymph nodes seen bilaterally that measure less than 1 cm on short axis. The examination    is somewhat limited due to the delay. The visualized upper lung field shows no pneumothorax or consolidation.       The right lobe of the thyroid gland is larger than the left lobe and extends substernal. The right lobe of the thyroid gland measures approximately 5.1 cm anterior posterior. The trachea is slightly deviated to the left.           Impression   Impression:       1. The right lobe of the thyroid gland appears enlarged and extends substernal. Recommend ultrasound of the thyroid gland for further evaluation.       2. An enlarged lymph node is seen in the right jugulodigastric region that may be reactive. The examination is limited due to delay in obtaining the images due to contrast reaction of the patient. The patient subsequently went to the emergency room and was treated for contrast allergic reaction   3. Chronic sinus disease.           All CT scans at this facility use dose modulation, iterative reconstruction, and/or weight based dosing when appropriate to reduce radiation dose to as low as reasonably achievable. COMPARISON: No prior studies available for comparison.       HISTORY: Thyroid cancer               TECHNIQUE: CT CHEST WO CONTRAST  Axial CT images of the thorax were obtained with intravenous contrast. 2-D reformatted axial, sagittal and coronal images were obtained. 3-D MIPS images were also performed.           FINDINGS:       No consolidating pneumonia, pneumothorax or pleural effusion is seen.  Multiple metastatic nodules are visualized.       Examples of nodules seen in the right lun mm right upper lobe posterior and medial, series 3 image 15       11 mm, right upper lobe, posterior medial, series 3 image 17       14 mm, right upper lobe anteriorly, series 3 image 19       10 mm, right hilum, right upper lobe series 3, image 23       9.6 mm, Right hilar, posterior right upper lobe, series 3 image 25       8 mm right lower lobe, posterior medial, series 3, image 36       8.1 mm, right lower lobe posterior and medial, series 3 image 41       18 mm, right lower lobe posterior medial, series 3 image 44       There are multiple additional small nodules throughout the right lung that measure 2 to 5 mm       Examples of nodules seen in the left lun mm, left lower lobe, series 3 image 37       10 mm,  left lower lobe lower lobe, series 3 image 38   6.5 mm, left lower lobe, series 3, image 40       Multiple small nodules measuring less than 5 mm are seen also.       In the right paratracheal region there is asymmetric soft tissue seen on the right that measures 2 cm. In no pathologically enlarged lymph nodes are seen. There is no evidence for aneurysm.       In the visualized upper abdomen there is evidence of cholecystectomy. A 14 mm hypodensity is seen in the posterior aspect of the left kidney. Degenerative changes are seen in the spine at multiple levels.           Impression   .       1. Multiple nodules are seen throughout both lungs. Most consistent with metastatic disease.       2. There is no evidence for consolidating pneumonia, pleural effusion or pneumothorax.       3. No aneurysm or dissection is seen.    4. Asymmetric soft tissue is seen in the visualized portion of the neck on the right side of the trachea.       All CT scans at this facility use dose modulation, iterative reconstruction, and/or weight based dosing when appropriate to reduce radiation dose to as low as reasonably achievable. EXAMINATION: CT SOFT TISSUE NECK WO CONTRAST        DATE AND TIME:12/15/2021 11:14 AM       CLINICAL HISTORY: Acute neck pain.  C73 Malignant neoplasm of thyroid gland ([facility] [address] ICD10     COMPARISON: Neck CT from September 10, 2021 and thyroid ultrasound from September 22, 2021       TECHNIQUE: Sequential axial CT images were obtained from the skull base to the thoracic inlet. No IV contrast.  3-D sagittal and coronal reconstructions have been performed on a dedicated workstation.       All CT scans at this facility use dose modulation, iterative reconstruction, and/or weight based dosing when appropriate to reduce radiation dose to as low as reasonably achievable.       FINDINGS       This study is limited due to lack of IV contrast and motion artifact.       The visualized intracranial regions are within normal limits. The orbits are unremarkable. There are no intra or extraconal lesions, the globes are intact. The paranasal sinuses are well aerated. There is mucoperiosteal thickening of the floor of the    left maxillary sinus and a 10 mm mucous retention cyst versus polyp in the floor of the right maxillary sinus.         The nasal cavities are within normal limits. There is narrowing of the airway at the level of the soft palate which is likely due to soft tissue swelling. Evaluation is limited without IV contrast.       There is motion artifact level of the hypopharynx, I:. The epiglottis is not distinct which may be secondary to the motion versus swelling. The vallecula are also not distinct. The piriform sinuses appear intact.        The with retraction of the right vocal cord which may account for the patient's symptoms. Cannot comment areas of enhancement in the surrounding laryngeal recesses. The subglottic airway is patent.       There are shotty cervical lymph nodes. The largest level 2A lymph node on the right measures 8 mm in transverse diameter.  The largest level 2A lymph node on the left measures 7 mm in transverse diameter.                        The great vessels of the neck are grossly within normal limits on noncontrast study.       The parotid and submandibular glands are normal in size and attenuation without focal lesions.  The thyroid gland is asymmetric, the right-sided lobe is larger than left consistent with the finding seen on the recent carotid ultrasound.       There is a 1.3 centimeter nodule in the right upper lung, please refer to chest CT results.    There are no acute osseous changes.           Impression   The study is limited due to lack of IV contrast and motion artifact.       There is asymmetry, retraction, likely paralysis of the right vocal cord which most likely correlates with the patient's symptoms.       Evaluation of the surrounding soft tissues is limited without IV contrast. There is shotty lymphadenopathy. No large areas of enhancement identified.       There is soft tissue swelling of the soft palate and at the level of the vallecula and epiglottis which may be secondary to inflammation or post XRT changes or other etiologies.       Please refer to chest CT report for further discussion of the pulmonary nodules.  PET/CT correlation for metabolic activity versus tissue diagnosis might be considered.                 Resulting Agency [address]             Narrative  Performed by: [place] [id number] Ordering Provider: Radhika Southfield                                            Accession #: [id number]             Pathologist:                   Tarun Martinez ASA, MD, PhD   Date of Procedure:    12/28/2021   Date Received:          12/28/2021   Date Reported           1/6/2022   Submitting Physician:   Yusef Aj MD   Location:                    [facility]  Other External #                                                              FINAL DIAGNOSIS   A.  RIGHT INFERIOR CONSTRICTOR:        INFILTRATING THYROID CARCINOMA: SKELETAL MUSCLE (RIGHT INFERIOR CONSTRICTOR)   BIOPSY   B.  RIGHT NECK  LEVEL 2:     METASTATIC CLASSICAL PAPILLARY THYROID CARCINOMA 1.0 AND 1.1 CM: LYMPH NODES, 2   OF 17 (RIGHT NECK, LEVEL 2) DISSECTION SPECIMEN       C.  RIGHT NECK LEVELS 3 AND 4:   NO PATHOLOGICAL DIAGNOSIS: LYMPH NODES, 2, FIBROADIPOSE TISSUE AND SKELETAL   MUSCLE FRAGMENT (RIGHT NECK. LEVELS 3 AND 4) DISSECTION SPECIMEN   D.  THYROID, RIGHT HEMITHYROIDECTOMY:     HIGH GRADE CLASSICAL PAPILLARY THYROID CARCINOMA WITH FOCAL TALL CELL CHANGE,        EXTRATHYROIDAL EXTENSION AND MARGIN INVOLVEMENT, 5.7 CM: THYROID   METASTATIC PAPILLARY THYROID CARCINOMA, LARGEST 0.9 CM: LYMPH NODES, 5   NO PATHOLOGICAL DIAGNOSIS: THYMUS         - RIGHT HEMITHYROIDECTOMY SPECIMEN   E.  LEFT HEMITHYROID:     FOCAL CHRONIC LYMPHOCYTIC THYROIDITIS: THYROID, LEFT COMPLETION THYROIDECTOMY   SPECIMEN   F.  CENTRAL NECK DISSECTION:     METASTATIC PAPILLARY THYROID CARCINOMA. LARGEST 2.3 CM WITH EXTRANODAL   EXTENSION: LYMPH NODES, 2 OF 4 (CENTRAL NECK) DISSECTION SPECIMEN   G.  ADDITIONAL LEVEL 4:     METASTATIC HIGH GRADE PAPILLARY THYROID CARCINOMA, 2.8 CM WITH EXTRANODAL   EXTENSION: LYMPH NODE (LEVEL 4) BIOPSY   Note   COMMENT   The skeletal muscle in specimen A has focal infiltration by tumor confirmed by   stains for PAX8 and TTF1.  This aggressive tumor exhibits a number of adverse   features including tumor necrosis and mitoses with a Ki67 labeling index that   reaches 15.8%, as well as lymphatic and perineural invasion, extrathyroidal   extension and resection margin involvement.                                                                                                                                             CANCER SUMMARY REPORT        SPECIMEN   Procedure:       Total thyroidectomy with cental and right neck dissection   specimens   TUMOR   Tumor Focality:      Unifocal      Tumor Characteristics      Tumor Site:      Right lobe      Tumor Size:      Greatest Dimension (Centimeters): 5.7 cm            Additional Dimension (Centimeters): 4.2 cm            Additional Dimension (Centimeters): 2.5 cm      Histologic Type:      Papillary carcinoma, classic (usual, conventional)      Tumor Necrosis:      Present      Angioinvasion (vascular invasion):      Not identified      Lymphatic Invasion:      Present      Perineural Invasion:      Present      Extrathyroidal Extension:      Present, microscopic strap muscle invasion   only, with no clinical / macroscopic evidence of invasion      Margin Status:      Carcinoma present at margin       Margin(s) Involved by Carcinoma:         multiple   REGIONAL LYMPH NODES   Regional Lymph Node Status:        Tumor present in regional lymph node(s)      Number of Lymph Nodes with Tumor:           10      Soni Level(s) Involved:          Level VI            Right Lateral Level II            Right Lateral Level IV      Size of Largest Metastatic Deposit:           2.8 cm      Extranodal Extension (JIM):          Present    Number of Lymph Nodes Examined:         29     Soni Level(s) Examined:         Level VI           Right Lateral Level II           Right Lateral Level III           Right Lateral Level IV   PATHOLOGIC STAGE CLASSIFICATION (pTNM, AJCC 8th Edition)    pT Category:        pT4a   pN Category:      pN1b   ADDITIONAL FINDINGS   Additional Findings:      None identified   ADDITIONAL TESTING   Representative Blocks:      Normal Block: E2        Tumor Block: D15                                                                                                                                                                                                                                                                                                                                            Electronically Signed Out By Maria D Sadler ASA, MD, PhD/SLA   By the signature on this report, the individual or group listed as making the   Final Interpretation/Diagnosis certifies that they have reviewed this case.          Intraoperative Consultation:   A: RIGHT INFERIOR CONSTRICTOR   Frozen Section 1:   Date Ordered: 12/28/2021 00:00     Date Received: 12/28/2021 11:26     Date Called: 12/28/2021 11:40   Intraoperative Diagnosis:   Minute atypical glandular proliferation suspicious for carcinoma   Intraoperative Consult Pathologist(s):   PRATIK Freedman III, D.OAsia (P)   [facility]/12/28/2021       Clinical History:   Thyroid cancer   Specimens Submitted As:   A: RIGHT INFERIOR CONSTRICTOR   B: RIGHT NECK  LEVEL 2   C: RIGHT NECK LEVELS 3 AND 4   D: RIGHT HEMITHYROID   E: LEFT HEMITHYROID   F: CENTRAL NECK DISSECTION   G: ADDITIONAL LEVEL 4   Gross Description:   A:  Received fresh, labeled with the patient's name and hospital number and   \"right inferior constrictor\", is a fragment of tan-white soft tissue that   measures 0.4 x 0.4 x 0.4 cm.  The specimen is frozen for intraoperative   consultation and submitted in 1 cassette. [facility]   B: Received in formalin, labeled with the patient's name and hospital number   and \"right neck level 2\", is a piece of fibroadipose tissue that measures 4.9 x   3.2  x 1.8 cm and contains multiple lymph nodes that range in size from 0.2 cm   to 2.2 cm.  The cut surface of the largest nodes appears grossly unremarkable.    There is no evidence of neoplasm within the soft tissue.  Representative   sections consisting of all possible identified lymph nodes are submitted in 8   cassettes.     B          1     multiple lymph nodes, in toto             2     multiple lymph nodes, in toto             3     multiple lymph nodes, in toto             4-8     one lymph node EACH, bisected   CJN       C: Received in formalin, labeled with the patient's name and hospital number   and \"right neck levels 3 and 4\", are multiple unoriented pieces of yellow-tan,   lobulated fibrofatty soft tissue that measure 6.0 x 4.2 x 1.6 cm in aggregate,   and a piece of possible muscle that measures 2.3 x 1.5 x 0.8 cm.  There is one   possible lymph node that measures 1.0 cm in greatest dimension and has a   grossly unremarkable cut surface.  There is no evidence of neoplasm within the soft tissue.  The specimen is entirely submitted in 9 cassettes.     C          1     possible muscle             2     one lymph node, in toto             3-9     remaining soft tissue   CJN   D: Received in formalin, labeled with the patient's name and hospital number   and \"right hemithyroid\" is a hemithyroidectomy that weighs 23.6 gm. The lobe   measures 5.7 x 4.2 x 2.5 cm, and the isthmus measures 2.3 x 1.0 x 1.0 cm.  The   surface is diffusely ragged with fibrous adhesions and is inked as follows:   anterior blue, posterior black, and isthmic resection margin yellow.  On   section, there is a pink-tan, fleshy, centrally yellow nodule that entirely   replaces the thyroid parenchyma.  The specimen is entirely submitted in 21   cassettes. D                           1                superior lobe                                  2-19         superior to inferior                                  20              inferior lobe                                  21              isthmus resection margin, en   face   CJN   E: Received in formalin, labeled with the patient's name and hospital number   and \"left hemithyroid\", is a hemithyroidectomy that weighs 3.0 gm. The lobe   measures 2.6 x 2.0 x 1.0 cm, and the isthmus measures 1.6 x 1.0 x 0.5 cm.  The   surface is diffusely ragged with fibrous adhesions and is inked as follows:   anterior blue, posterior black, and isthmic resection margin yellow.  The cut   surface is unremarkable.  The specimen is entirely submitted in 5 cassettes.    E                            1                superior lobe                                  2           superior to inferior                                  3              inferior lobe                                  4              isthmus resection margin, en face                                  5         isthmus, from margin to lobe   CJN   F: Received in formalin, labeled with the patient's name and hospital number   and \"E\", is a piece of fibroadipose tissue that measures 3.2 x 2.4 x 1.5 cm and   contains one possible lymph node that measures 2.3 cm in greatest dimention. The cut surface of the lymph node appears grossly involved by neoplasm. Ephriam Coon   is no evidence of neoplasm within the soft tissue.  The specimen is entirely   submitted in 4 cassettes.     F          1-3     one lymph node, entirely             4     remaining soft tissue   CJN   G: Received in formalin, labeled with the patient's name and hospital number   and \"F\", is a possible lymph node that measures 2.8 x 2.7 x 1.7 cm.  The   specimen is serially sectioned and entirely submitted in 5 cassettes. CJN   [facility]/12/28/2021       The assays/tests were performed with appropriate positive and negative controls   which stained appropriately.    [facility]   Department of Pathology   [address]   [facility] [employer] [facility]             Past Medical History:   Diagnosis Date    Acquired hypothyroidism 12/20/2017    meds > 10 yrs    Anxiety 2008    Benign non-nodular prostatic hyperplasia without lower urinary tract symptoms 3/3/2016    Chronic bilateral low back pain 3/29/2019    History of 2019 novel coronavirus disease (COVID-19) 9/10/2021    Hypertriglyceridemia 8/25/2020    high triglycerides -- diet control    Leukopenia 7/24/2012    PONV (postoperative nausea and vomiting)     severe n/v with choli     Past Surgical History:   Procedure Laterality Date    CHOLECYSTECTOMY, LAPAROSCOPIC  2010    COLONOSCOPY  06/20/2018    int hemorr, 10y repeat (DR Gavi Luna)    NECK SURGERY Right 11/23/2021    Deep jugular node dissection with removal of right jovanny mass conglomerate (DR CERVANTES)    NECK SURGERY Right 11/23/2021    REMOVAL OF RIGHT NECK MASS performed by Rosa Maria Shipley MD at [address] [place] [address]  2009    gastritis    VASECTOMY  2007     Social History     Socioeconomic History    Marital status:      Spouse name: Hira Lopez Number of children: 2    Years of education: Not on file    Highest education level: Not on file   Occupational History    Occupation: nursing director     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   Tobacco Use    Smoking status: Former Smoker     Packs/day: 0.30     Years: 5.00     Pack years: 1.50     Types: Cigarettes     Start date:      Quit date: 2003     Years since quittin.0    Smokeless tobacco: Never Used   Vaping Use    Vaping Use: Never used   Substance and Sexual Activity    Alcohol use: Yes     Comment: social    Drug use: No    Sexual activity: Yes     Partners: Female     Comment: monogamous   Other Topics Concern    Not on file   Social History Narrative    Lives with wife and children        1 dog        Hobbies: reading, outdoor work     Social Determinants of Health     Financial Resource Strain: Low Risk     Difficulty of Paying Living Expenses: Not hard at all   Food Insecurity: No Food Insecurity    Worried About [address] in the Last Year: Never true    [address] in the Last Year: Never true   Transportation Needs:     Lack of Transportation (Medical): Not on file    Lack of Transportation (Non-Medical):  Not on file   Physical Activity:     Days of Exercise per Week: Not on file    Minutes of Exercise per Session: Not on file   Stress:     Feeling of Stress : Not on file   Social Connections:     Frequency of Communication with Friends and Family: Not on file    Frequency of Social Gatherings with Friends and Family: Not on file    Attends Tenri[employer] Services: Not on file    Active Member of Clubs or Organizations: Not on file    Attends Club or Organization Meetings: Not on file    Marital Status: Not on file   Intimate Partner Violence:     Fear of Current or Ex-Partner: Not on file    Emotionally Abused: Not on file    Physically Abused: Not on file    Sexually Abused: Not on file Housing Stability:     Unable to Pay for Housing in the Last Year: Not on file    Number of Places Lived in the Last Year: Not on file    Unstable Housing in the Last Year: Not on file     Family History   Problem Relation Age of Onset    Diabetes Mother    Atkins Anemia Mother     High Blood Pressure Mother     Kidney Disease Mother     Arthritis Mother     Asthma Mother     Cervical Cancer Mother 43    Heart Failure Mother     High Cholesterol Mother     High Blood Pressure Father     Diabetes Father     Cirrhosis Father         Hep C    Mental Retardation Maternal Uncle     Diabetes type 2  Brother     No Known Problems Maternal Grandmother     No Known Problems Maternal Grandfather     No Known Problems Paternal Grandmother     No Known Problems Paternal Grandfather     No Known Problems Brother     No Known Problems Daughter     No Known Problems Son      Allergies   Allergen Reactions    Iv Dye [Iodides] Anaphylaxis    Codeine Nausea And Vomiting       Current Outpatient Medications:     Multiple Vitamins-Minerals (THERAPEUTIC MULTIVITAMIN-MINERALS) tablet, Take 1 tablet by mouth daily, Disp: , Rfl:     ibuprofen (ADVIL;MOTRIN) 200 MG tablet, Take 200 mg by mouth every 6 hours as needed for Pain, Disp: , Rfl:     levothyroxine (SYNTHROID) 137 MCG tablet, Take 1 tablet by mouth Daily, Disp: 90 tablet, Rfl: 1    diphenhydrAMINE (BENADRYL) 25 MG capsule, Take 2 capsules by mouth every 4 hours as needed for Itching (swelling), Disp: 20 capsule, Rfl: 0    acetaminophen (TYLENOL) 325 MG tablet, Take 325 mg by mouth every 6 hours as needed.  OTC  , Disp: , Rfl:   Lab Results   Component Value Date     01/02/2022    K 3.7 01/02/2022     01/02/2022    CO2 26 11/16/2021    BUN 19 11/16/2021    CREATININE 1.03 01/02/2022    GLUCOSE 113 (H) 01/02/2022    CALCIUM 11.0 (H) 01/02/2022    PROT 7.4 01/02/2022    LABALBU 4.7 01/02/2022    BILITOT 0.9 01/02/2022    ALKPHOS 53 01/02/2022 AST 21 01/02/2022    ALT 18 01/02/2022    LABGLOM >60 01/02/2022    GFRAA >60 01/02/2022    GLOB 2.4 09/07/2021     Lab Results   Component Value Date    WBC 8.7 01/02/2022    HGB 14.2 01/02/2022    HCT 41.0 01/02/2022    MCV 88 01/02/2022     01/02/2022     No results found for: LABA1C  Lab Results   Component Value Date    HDL 43 09/07/2021    HDL 40 06/25/2021    HDL 34 (L) 07/31/2020    LDLCALC 87 09/07/2021    LDLCALC 77 06/25/2021    LDLCALC 83 07/31/2020    CHOL 156 09/07/2021    CHOL 139 06/25/2021    CHOL 147 07/31/2020    TRIG 131 09/07/2021    TRIG 109 06/25/2021    TRIG 152 (H) 07/31/2020     Lab Results   Component Value Date    TESTM 598 12/20/2017     Lab Results   Component Value Date    TSH 4.760 (H) 09/07/2021    TSH 3.020 07/17/2020    TSH 1.450 04/01/2019    T4FREE 1.28 09/07/2021    T4FREE 1.57 07/17/2020    T4FREE 1.43 04/01/2019       Review of Systems   Constitutional: Positive for fatigue. HENT: Positive for voice change. Respiratory: Negative. Cardiovascular: Negative. Gastrointestinal: Negative for anorexia. Endocrine: Positive for cold intolerance. Musculoskeletal: Negative for neck pain. Neurological: Negative. Hematological: Negative. All other systems reviewed and are negative. Objective:   Physical Exam  Vitals reviewed. Constitutional:       General: He is not in acute distress. Appearance: Normal appearance. He is obese. HENT:      Head: Normocephalic and atraumatic. Hair is normal.      Right Ear: External ear normal.      Left Ear: External ear normal.      Nose: Nose normal.   Eyes:      General: No scleral icterus. Right eye: No discharge. Left eye: No discharge. Extraocular Movements: Extraocular movements intact. Conjunctiva/sclera: Conjunctivae normal.   Neck:      Thyroid: No thyroid mass or thyroid tenderness.       Trachea: Trachea normal.     Cardiovascular:      Rate and Rhythm: Normal rate and regular rhythm. Heart sounds: Normal heart sounds. Pulmonary:      Effort: Pulmonary effort is normal.      Breath sounds: Normal breath sounds. No wheezing or rhonchi. Abdominal:      Palpations: Abdomen is soft. Musculoskeletal:         General: Normal range of motion. Cervical back: Normal range of motion and neck supple. Lymphadenopathy:      Cervical: No cervical adenopathy. Skin:     General: Skin is warm and dry. Neurological:      General: No focal deficit present. Mental Status: He is alert and oriented to person, place, and time.    Psychiatric:         Mood and Affect: Mood normal.         Behavior: Behavior normal.

## 2022-01-11 ENCOUNTER — HOSPITAL ENCOUNTER (OUTPATIENT)
Dept: LAB | Age: 52
Discharge: HOME | End: 2022-01-11
Payer: COMMERCIAL

## 2022-01-11 DIAGNOSIS — C73 PAPILLARY THYROID CARCINOMA (HCC): ICD-10-CM

## 2022-01-11 LAB
T4 FREE: 0.22 NG/DL (ref 0.84–1.68)
TSH REFLEX: 83.84 UIU/ML (ref 0.44–3.86)

## 2022-01-11 PROCEDURE — 84439 ASSAY OF FREE THYROXINE: CPT

## 2022-01-11 PROCEDURE — 86800 THYROGLOBULIN ANTIBODY: CPT

## 2022-01-11 PROCEDURE — 36415 COLL VENOUS BLD VENIPUNCTURE: CPT

## 2022-01-11 PROCEDURE — 84432 ASSAY OF THYROGLOBULIN: CPT

## 2022-01-11 PROCEDURE — 84443 ASSAY THYROID STIM HORMONE: CPT

## 2022-01-13 LAB — THYROGLOBULIN ANTIBODY: >4794 IU/ML (ref 0–40)

## 2022-01-14 ENCOUNTER — HOSPITAL ENCOUNTER (OUTPATIENT)
Dept: PET IMAGING | Age: 52
Discharge: HOME | End: 2022-01-16
Payer: COMMERCIAL

## 2022-01-14 DIAGNOSIS — C73 MALIGNANT NEOPLASM OF THYROID GLAND (HCC): ICD-10-CM

## 2022-01-14 LAB — METER GLUCOSE: 105 MG/DL (ref 74–99)

## 2022-01-14 PROCEDURE — 78815 PET IMAGE W/CT SKULL-THIGH: CPT

## 2022-01-14 PROCEDURE — 3430000000 HC RX DIAGNOSTIC RADIOPHARMACEUTICAL: Performed by: RADIOLOGY

## 2022-01-14 PROCEDURE — 82962 GLUCOSE BLOOD TEST: CPT

## 2022-01-14 PROCEDURE — A9552 F18 FDG: HCPCS | Performed by: RADIOLOGY

## 2022-01-14 RX ORDER — FLUDEOXYGLUCOSE F 18 200 MCI/ML
15 INJECTION, SOLUTION INTRAVENOUS
Status: COMPLETED | OUTPATIENT
Start: 2022-01-14 | End: 2022-01-14

## 2022-01-14 RX ADMIN — FLUDEOXYGLUCOSE F 18 15 MILLICURIE: 200 INJECTION, SOLUTION INTRAVENOUS at 13:59

## 2022-01-17 ENCOUNTER — HOSPITAL ENCOUNTER (OUTPATIENT)
Dept: NUCLEAR MEDICINE | Age: 52
Discharge: HOME | End: 2022-01-19
Payer: COMMERCIAL

## 2022-01-17 DIAGNOSIS — C73 PAPILLARY THYROID CARCINOMA (HCC): ICD-10-CM

## 2022-01-17 LAB — THYROGLOBULIN BY LC-MS/MS, SERUM/PLASMA: 1 NG/ML (ref 1.3–31.8)

## 2022-01-17 PROCEDURE — 3430000000 HC RX DIAGNOSTIC RADIOPHARMACEUTICAL: Performed by: INTERNAL MEDICINE

## 2022-01-17 PROCEDURE — 78014 THYROID IMAGING W/BLOOD FLOW: CPT

## 2022-01-17 PROCEDURE — A9516 IODINE I-123 SOD IODIDE MIC: HCPCS | Performed by: INTERNAL MEDICINE

## 2022-01-17 RX ORDER — SODIUM IODIDE I 123 100 UCI/1
200 CAPSULE, GELATIN COATED ORAL ONCE
Status: COMPLETED | OUTPATIENT
Start: 2022-01-17 | End: 2022-01-17

## 2022-01-17 RX ADMIN — SODIUM IODIDE I 123 258 MICRO CURIE: 100 CAPSULE, GELATIN COATED ORAL at 09:13

## 2022-01-18 ENCOUNTER — HOSPITAL ENCOUNTER (OUTPATIENT)
Dept: NUCLEAR MEDICINE | Age: 52
Discharge: HOME | End: 2022-01-20
Payer: COMMERCIAL

## 2022-01-18 DIAGNOSIS — C73 PAPILLARY THYROID CARCINOMA (HCC): ICD-10-CM

## 2022-01-18 PROCEDURE — 78018 THYROID MET IMAGING BODY: CPT

## 2022-01-18 PROCEDURE — A9517 I131 IODIDE CAP, RX: HCPCS | Performed by: INTERNAL MEDICINE

## 2022-01-18 PROCEDURE — 3430000000 HC RX DIAGNOSTIC RADIOPHARMACEUTICAL: Performed by: INTERNAL MEDICINE

## 2022-01-18 RX ADMIN — SODIUM IODIDE I 131 4.8 MILLICURIE: 1 CAPSULE, GELATIN COATED ORAL at 13:25

## 2022-01-20 ENCOUNTER — HOSPITAL ENCOUNTER (OUTPATIENT)
Dept: NUCLEAR MEDICINE | Age: 52
Discharge: HOME | End: 2022-01-22
Payer: COMMERCIAL

## 2022-01-20 DIAGNOSIS — R91.1 LUNG NODULE SEEN ON IMAGING STUDY: Primary | ICD-10-CM

## 2022-01-21 ENCOUNTER — HOSPITAL ENCOUNTER (OUTPATIENT)
Dept: NUCLEAR MEDICINE | Age: 52
Discharge: HOME | End: 2022-01-23

## 2022-01-24 ENCOUNTER — OFFICE VISIT (OUTPATIENT)
Dept: PULMONOLOGY | Age: 52
End: 2022-01-24
Payer: COMMERCIAL

## 2022-01-24 ENCOUNTER — TELEPHONE (OUTPATIENT)
Dept: INTERVENTIONAL RADIOLOGY/VASCULAR | Age: 52
End: 2022-01-24

## 2022-01-24 ENCOUNTER — INITIAL CONSULT (OUTPATIENT)
Dept: INTERVENTIONAL RADIOLOGY/VASCULAR | Age: 52
End: 2022-01-24
Payer: COMMERCIAL

## 2022-01-24 ENCOUNTER — PREP FOR PROCEDURE (OUTPATIENT)
Dept: INTERVENTIONAL RADIOLOGY/VASCULAR | Age: 52
End: 2022-01-24

## 2022-01-24 VITALS
HEART RATE: 83 BPM | HEIGHT: 71 IN | SYSTOLIC BLOOD PRESSURE: 144 MMHG | BODY MASS INDEX: 27.47 KG/M2 | OXYGEN SATURATION: 99 % | DIASTOLIC BLOOD PRESSURE: 84 MMHG | WEIGHT: 196.2 LBS | TEMPERATURE: 97.2 F | RESPIRATION RATE: 16 BRPM

## 2022-01-24 VITALS
SYSTOLIC BLOOD PRESSURE: 144 MMHG | HEART RATE: 83 BPM | WEIGHT: 196 LBS | DIASTOLIC BLOOD PRESSURE: 84 MMHG | BODY MASS INDEX: 27.34 KG/M2

## 2022-01-24 DIAGNOSIS — C73 PAPILLARY THYROID CARCINOMA (HCC): Primary | ICD-10-CM

## 2022-01-24 DIAGNOSIS — C73 PAPILLARY THYROID CARCINOMA (HCC): ICD-10-CM

## 2022-01-24 DIAGNOSIS — R91.1 NODULE OF UPPER LOBE OF RIGHT LUNG: ICD-10-CM

## 2022-01-24 DIAGNOSIS — R93.89 ABNORMAL CT OF THE CHEST: ICD-10-CM

## 2022-01-24 DIAGNOSIS — R94.2 ABNORMAL PET SCAN, LUNG: Primary | ICD-10-CM

## 2022-01-24 DIAGNOSIS — R91.8 LUNG NODULES: Primary | ICD-10-CM

## 2022-01-24 DIAGNOSIS — R94.2 ABNORMAL PET SCAN, LUNG: ICD-10-CM

## 2022-01-24 LAB
INR BLD: 1
PROTHROMBIN TIME: 13 SEC (ref 12.3–14.9)

## 2022-01-24 PROCEDURE — 99204 OFFICE O/P NEW MOD 45 MIN: CPT | Performed by: INTERNAL MEDICINE

## 2022-01-24 PROCEDURE — 99204 OFFICE O/P NEW MOD 45 MIN: CPT | Performed by: NURSE PRACTITIONER

## 2022-01-24 RX ORDER — 0.9 % SODIUM CHLORIDE 0.9 %
250 INTRAVENOUS SOLUTION INTRAVENOUS
Status: CANCELLED | OUTPATIENT
Start: 2022-01-24

## 2022-01-24 RX ORDER — FENTANYL CITRATE 50 UG/ML
50 INJECTION, SOLUTION INTRAMUSCULAR; INTRAVENOUS
Status: CANCELLED | OUTPATIENT
Start: 2022-01-24

## 2022-01-24 RX ORDER — MIDAZOLAM HYDROCHLORIDE 1 MG/ML
0.5 INJECTION INTRAMUSCULAR; INTRAVENOUS
Status: CANCELLED | OUTPATIENT
Start: 2022-01-24

## 2022-01-24 RX ORDER — LIDOCAINE HYDROCHLORIDE 20 MG/ML
10 INJECTION, SOLUTION INFILTRATION; PERINEURAL
Status: CANCELLED | OUTPATIENT
Start: 2022-01-24

## 2022-01-24 RX ORDER — IBUPROFEN 200 MG
TABLET ORAL ONCE
Status: CANCELLED | OUTPATIENT
Start: 2022-01-24 | End: 2022-01-24

## 2022-01-24 ASSESSMENT — ENCOUNTER SYMPTOMS
COUGH: 0
BACK PAIN: 1
WHEEZING: 0
ABDOMINAL PAIN: 0
EYES NEGATIVE: 1
RESPIRATORY NEGATIVE: 1
VOICE CHANGE: 1
VOMITING: 0
SHORTNESS OF BREATH: 0
GASTROINTESTINAL NEGATIVE: 1
ABDOMINAL DISTENTION: 0
SORE THROAT: 0
NAUSEA: 0
DIARRHEA: 0
CONSTIPATION: 0

## 2022-01-24 NOTE — TELEPHONE ENCOUNTER
PATIENT WAS GIVEN THIS INFORMATION PRIOR TO LEAVING THE OFFICE / VIA PHONE CONVERSATION - VOICED UNDERSTANDING  >  YOUR PROCEDURE IS SCHEDULED ON: 01/25/2022 @ 11  >  You will need to arrive at 10 and check in at the Diagnostic Imaging Check In desk.   >  Do not eat or drink after midnight. Sips of water is acceptable ONLY to take medications. >  Make arrangements for transportation, as you should not drive immediately after.   > getting blood work 01/24/22

## 2022-01-24 NOTE — PROGRESS NOTES
Subjective:     Shane Dang is a 46 y.o. 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 Isamar Jones to evaluate patient for possible metastatic disease other than thyroid cancer. Patient is doing good otherwise, denies chest pain, no coughing, no weight loss, he has no prior pulmonary symptoms, he smoked for almost 7 years few cigarettes a day, no significant occupational exposure, he used to have birds as a child and he had a parakeet for 1 year in the past, no family history of lung cancer, no history of sarcoidosis, no skin rash, no joint swelling or pain. Allergies:   Iv dye [iodides], Iodine, and Codeine  Past Medical History:   Diagnosis Date    Acquired hypothyroidism 12/20/2017    meds > 10 yrs    Anxiety 2008    Benign non-nodular prostatic hyperplasia without lower urinary tract symptoms 3/3/2016    Chronic bilateral low back pain 3/29/2019    History of 2019 novel coronavirus disease (COVID-19) 9/10/2021    Hypertriglyceridemia 8/25/2020    high triglycerides -- diet control    Leukopenia 7/24/2012    PONV (postoperative nausea and vomiting)     severe n/v with choli     Past Surgical History:   Procedure Laterality Date    CHOLECYSTECTOMY, LAPAROSCOPIC  2010    COLONOSCOPY  06/20/2018    int hemorr, 10y repeat (DR SCHERER)    NECK SURGERY Right 11/23/2021    Deep jugular node dissection with removal of right j[employer] (DR CERVANTES)    NECK SURGERY Right 11/23/2021    REMOVAL OF RIGHT NECK MASS performed by Katie Obregon MD at [address]  2009    gastritis    VASECTOMY 2007     Family History   Problem Relation Age of Onset    Diabetes Mother     Anemia Mother     High Blood Pressure Mother     Kidney Disease Mother     Arthritis Mother     Asthma Mother     Cervical Cancer Mother 43    Heart Failure Mother     High Cholesterol Mother     High Blood Pressure Father     Diabetes Father     Cirrhosis Father         Hep C    Mental Retardation Maternal Uncle     Diabetes type 2  Brother     No Known Problems Maternal Grandmother     No Known Problems Maternal Grandfather     No Known Problems Paternal Grandmother     No Known Problems Paternal Grandfather     No Known Problems Brother     No Known Problems Daughter     No Known Problems Son      Social History     Socioeconomic History    Marital status:      Spouse name: Adele    Number of children: 2    Years of education: Not on file    Highest education level: Not on file   Occupational History    Occupation: nursing director     Comment: [id number]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 [employer]   Tobacco Use    Smoking status: Former Smoker     Packs/day: 0.30     Years: 5.00     Pack years: 1.50     Types: Cigarettes     Start date:      Quit date: 2003     Years since quittin.0    Smokeless tobacco: Never Used   Vaping Use    Vaping Use: Never used   Substance and Sexual Activity    Alcohol use: Yes     Comment: social    Drug use: No    Sexual activity: Yes     Partners: Female     Comment: monogamous   Other Topics Concern    Not on file   Social History Narrative    Lives with wife and children        1 dog        Hobbies: reading, outdoor work     Social Determinants of Health     Financial Resource Strain: Low Risk     Difficulty of Paying Living Expenses: Not hard at all   Food Insecurity: No Food Insecurity    Worried About [address] in the Last Year: Never true    [address] in the Last Year: Never true   Transportation Needs:     Lack of Transportation (Medical): Not on file    Lack of Transportation (Non-Medical): Not on file   Physical Activity:     Days of Exercise per Week: Not on file    Minutes of Exercise per Session: Not on file   Stress:     Feeling of Stress : Not on file   Social Connections:     Frequency of Communication with Friends and Family: Not on file    Frequency of Social Gatherings with Friends and Family: Not on file    Attends Worship Services: Not on file    Active Member of [address] [place] [address] or Organizations: Not on file    Attends Club or Organization Meetings: Not on file    Marital Status: Not on file   Intimate Partner Violence:     Fear of Current or Ex-Partner: Not on file    Emotionally Abused: Not on file    Physically Abused: Not on file    Sexually Abused: Not on file   Housing Stability:     Unable to Pay for Housing in the Last Year: Not on file    Number of [place] in the Last Year: Not on file    Unstable Housing in the Last Year: Not on file         Review of Systems      ROS: 10 organs review of system is done including general, psychological, ENT, hematological, endocrine, respiratory, cardiovascular, gastrointestinal,musculoskeletal, neurological,  allergy and Immunology is done and is otherwise negative. Current Outpatient Medications   Medication Sig Dispense Refill    ibuprofen (ADVIL;MOTRIN) 200 MG tablet Take 200 mg by mouth every 6 hours as needed for Pain      diphenhydrAMINE (BENADRYL) 25 MG capsule Take 2 capsules by mouth every 4 hours as needed for Itching (swelling) 20 capsule 0    acetaminophen (TYLENOL) 325 MG tablet Take 325 mg by mouth every 6 hours as needed.  OTC         Multiple Vitamins-Minerals (THERAPEUTIC MULTIVITAMIN-MINERALS) tablet Take 1 tablet by mouth daily (Patient not taking: Reported on 1/24/2022)      levothyroxine (SYNTHROID) 137 MCG tablet Take 1 tablet by mouth Daily (Patient not taking: Reported on 1/24/2022) 90 tablet 1     No current facility-administered medications for this visit. Objective:     Vitals:    01/24/22 1327 01/24/22 1335   BP: (!) 148/88 (!) 144/84   Site: Left Upper Arm Right Upper Arm   Position: Sitting Sitting   Cuff Size: Medium Adult Medium Adult   Pulse: 83    Resp: 16    Temp: 97.2 °F (36.2 °C)    TempSrc: Infrared    SpO2: 99%    Weight: 196 lb 3.2 oz (89 kg)    Height: 5' 11\" (1.803 m)          Physical Exam  Constitutional:       Appearance: He is well-developed. HENT:      Head: Normocephalic and atraumatic. Eyes:      General:         Left eye: No discharge. Conjunctiva/sclera: Conjunctivae normal.      Pupils: Pupils are equal, round, and reactive to light. Neck:      Vascular: No JVD. Cardiovascular:      Rate and Rhythm: Normal rate and regular rhythm. Heart sounds: Normal heart sounds. No murmur heard. No friction rub. No gallop. Pulmonary:      Effort: Pulmonary effort is normal. No respiratory distress. Breath sounds: Normal breath sounds. No wheezing or rales. Chest:      Chest wall: No tenderness. Abdominal:      General: Bowel sounds are normal.      Palpations: Abdomen is soft. Musculoskeletal:         General: No tenderness or deformity. Cervical back: Normal range of motion and neck supple. Right lower leg: No edema. Left lower leg: No edema. Skin:     General: Skin is warm and dry. Neurological:      Mental Status: He is alert and oriented to person, place, and time. Psychiatric:         Judgment: Judgment normal.         Imaging studies reviewed by me PET CT scan r January 14, 2021, and CAT scan December 15, 2021 eviewed with the patient, showing multiple lung nodules, PET positive, also PET positive nodule in the office on the right  Lab results reviewed in chart          Assessment and Plan       Diagnosis Orders   1.  Lung nodules  Opal Mcdonald MD, Interventional Radiology, [facility]     · PET positive, differential diagnosis include malignancy, needs to be ruled out, other possibilities is infectious or inflammatory process. 2 of the nodules might be accessible with EBUS, however it might be a better target for CT-guided biopsy. I will refer patient to interventional radiology if not accessible then will plan EBUS if I cannot get either way is then will consider surgery. Patient is agreeable with the plan, I discussed with Denia Booth who will see patient today and schedule him for Dr. Ashwin Falcon This Encounter   Procedures   Zarina Ceja MD, Interventional Radiology, [facility]     Referral Priority:   Routine     Referral Type:   Eval and Treat     Referral Reason:   Specialty Services Required     Requested Specialty:   Radiology     Number of Visits Requested:   1     No orders of the defined types were placed in this encounter. Discussed with patient the importance of exercise and weight control and  overall health and well-being. Reviewed with the patient: current clinical status, medications, activities and diet. Side effects, adverse effects of the medication prescribed today, as well as treatment plan and result expectations have been discussed with the patient who expresses understanding and desires to proceed. Return in about 3 weeks (around 2/14/2022).       Craig Hernández MD

## 2022-01-24 NOTE — PROGRESS NOTES
Medical History:   Diagnosis Date    Acquired hypothyroidism 2017    meds > 10 yrs    Anxiety 2008    Benign non-nodular prostatic hyperplasia without lower urinary tract symptoms 3/3/2016    Chronic bilateral low back pain 3/29/2019    History of 2019 novel coronavirus disease (COVID-19) 9/10/2021    Hypertriglyceridemia 2020    high triglycerides -- diet control    Leukopenia 2012    PONV (postoperative nausea and vomiting)     severe n/v with choli       Social History     Socioeconomic History    Marital status:      Spouse name: Adele    Number of children: 2    Years of education: Not on file    Highest education level: Not on file   Occupational History    Occupation: nursing director     Comment: [id number] coordinator of the [employer]   Tobacco Use    Smoking status: Former Smoker     Packs/day: 0.30     Years: 5.00     Pack years: 1.50     Types: Cigarettes     Start date:      Quit date: 2003     Years since quittin.0    Smokeless tobacco: Never Used   Vaping Use    Vaping Use: Never used   Substance and Sexual Activity    Alcohol use: Yes     Comment: social    Drug use: No    Sexual activity: Yes     Partners: Female     Comment: monogamous   Other Topics Concern    Not on file   Social History Narrative    Lives with wife and children        1 dog        Hobbies: reading, outdoor work     Social Determinants of Health     Financial Resource Strain: Low Risk     Difficulty of Paying Living Expenses: Not hard at all   Food Insecurity: No Food Insecurity    Worried About [facility] in the Last Year: Never true    [facility] in the Last Year: Never true   Transportation Needs:     Lack of Transportation (Medical): Not on file    Lack of Transportation (Non-Medical):  Not on file   Physical Activity:     Days of Exercise per Week: Not on file    Minutes of Exercise per Session: Not on file   Stress:     Feeling of Stress : Not on file   Social Connections:     Frequency of Communication with Friends and Family: Not on file    Frequency of Social Gatherings with Friends and Family: Not on file    Attends Religion Services: Not on file    Active Member of Clubs or Organizations: Not on file    Attends Club or Organization Meetings: Not on file    Marital Status: Not on file   Intimate Partner Violence:     Fear of Current or Ex-Partner: Not on file    Emotionally Abused: Not on file    Physically Abused: Not on file    Sexually Abused: Not on file   Housing Stability:     Unable to Pay for Housing in the Last Year: Not on file    Number of [place] in the Last Year: Not on file    Unstable Housing in the Last Year: Not on file       Allergies   Allergen Reactions    Iv Dye [Iodides] Anaphylaxis    Iodine Other (See Comments)    Codeine Nausea And Vomiting       Current Outpatient Medications on File Prior to Visit   Medication Sig Dispense Refill    Multiple Vitamins-Minerals (THERAPEUTIC MULTIVITAMIN-MINERALS) tablet Take 1 tablet by mouth daily (Patient not taking: Reported on 1/24/2022)      ibuprofen (ADVIL;MOTRIN) 200 MG tablet Take 200 mg by mouth every 6 hours as needed for Pain      levothyroxine (SYNTHROID) 137 MCG tablet Take 1 tablet by mouth Daily (Patient not taking: Reported on 1/24/2022) 90 tablet 1    diphenhydrAMINE (BENADRYL) 25 MG capsule Take 2 capsules by mouth every 4 hours as needed for Itching (swelling) 20 capsule 0    acetaminophen (TYLENOL) 325 MG tablet Take 325 mg by mouth every 6 hours as needed. OTC          No current facility-administered medications on file prior to visit. Review of Systems   Constitutional: Positive for fatigue. Negative for activity change, appetite change, chills and fever. HENT: Positive for voice change (hoarseness/loss of voice). Negative for congestion and sore throat. Eyes: Negative. Respiratory: Negative.   Negative for cough, shortness of breath and wheezing. Cardiovascular: Negative. Negative for chest pain, palpitations and leg swelling. Gastrointestinal: Negative. Negative for abdominal distention, abdominal pain, constipation, diarrhea, nausea and vomiting. Endocrine:        Intermittent night sweats. Genitourinary: Negative. Negative for difficulty urinating, dysuria and hematuria. Musculoskeletal: Positive for back pain (mid and lower back ). Negative for arthralgias. Generalized body aches   Skin: Negative. Neurological: Positive for numbness (right neck). Negative for dizziness, light-headedness and headaches. Hematological: Negative. Does not bruise/bleed easily. Psychiatric/Behavioral: Negative. OBJECTIVE:  BP (!) 144/84   Pulse 83   Wt 196 lb (88.9 kg)   BMI 27.34 kg/m²     Physical Exam  Constitutional:       General: He is not in acute distress. Appearance: Normal appearance. He is not ill-appearing. HENT:      Head: Normocephalic and atraumatic. Nose: No congestion. Cardiovascular:      Rate and Rhythm: Normal rate. Heart sounds: Normal heart sounds. Pulmonary:      Effort: Pulmonary effort is normal.   Abdominal:      General: Bowel sounds are normal. There is no distension. Palpations: Abdomen is soft. Tenderness: There is no abdominal tenderness. Musculoskeletal:         General: Normal range of motion. Cervical back: Normal range of motion. Right lower leg: No edema. Left lower leg: No edema. Skin:     General: Skin is warm and dry. Neurological:      Mental Status: He is alert and oriented to person, place, and time.    Psychiatric:         Mood and Affect: Mood normal.         Behavior: Behavior normal.         Narrative   EXAMINATION:   WHOLE BODY PET/CT       1/14/2022       TECHNIQUE:   Following IV injection of 17.4 mCi of F-18 FDG, PET  tumor imaging was   acquired from the base of the skull to the mid thighs.  Computed tomography was used for purposes of attenuation correction and anatomic localization. Fusion imaging was utilized for interpretation.       Uptake time 60 min.  Glucose level 105 mg/dl.       COMPARISON:   None       HISTORY:   ORDERING SYSTEM PROVIDED HISTORY: Malignant neoplasm of thyroid gland ([address]       Thyroid removed 12/28/2021       FINDINGS:   HEAD/NECK: There is heterogeneous activity in the neck with postsurgical   changes from thyroidectomy and right neck dissection with greatest SUV max of   5.2.  No obvious metabolically active lymphadenopathy.       CHEST: There are multiple metabolically active pulmonary nodules in the lungs   bilaterally (greater than 15 nodules) with a representative right upper lobe   nodule measuring 1.5 cm, SUV max of 29.       No metabolically active axillary, hilar, or mediastinal lymphadenopathy.       ABDOMEN/PELVIS: No metabolic activity associated with an exophytic cystic   lesion at the right kidney.  No metabolically active intraperitoneal mass.    FDG activity localizes to a 1.8 cm right pelvic sidewall nodule with SUV max   of 5.3.  Physiologic activity in the gastrointestinal and genitourinary   systems.       BONES/SOFT TISSUE: No abnormal FDG activity localizes to the bones.  No   aggressive osseous lesion.       INCIDENTAL CT FINDINGS: The gallbladder has been removed.           Impression   1.  Metabolically active bilateral pulmonary nodules compatible with   metastatic thyroid cancer.       2.  Postsurgical changes in the neck from recent thyroidectomy and right neck   dissection.  No obvious residual disease.  Attention on follow-up.       3.  Metabolically active 1.8 cm nodule along the right pelvic sidewall may   also represent metastatic disease, although this is considerably less FDG   avid than the pulmonary nodules.           12/15/2021:   Narrative           COMPARISON: No prior studies available for comparison.       HISTORY: Thyroid cancer             TECHNIQUE: CT CHEST WO CONTRAST  Axial CT images of the thorax were obtained with intravenous contrast. 2-D reformatted axial, sagittal and coronal images were obtained. 3-D MIPS images were also performed.           FINDINGS:       No consolidating pneumonia, pneumothorax or pleural effusion is seen. Multiple metastatic nodules are visualized.       Examples of nodules seen in the right lun mm right upper lobe posterior and medial, series 3 image 15       11 mm, right upper lobe, posterior medial, series 3 image 17       14 mm, right upper lobe anteriorly, series 3 image 19       10 mm, right hilum, right upper lobe series 3, image 23       9.6 mm, Right hilar, posterior right upper lobe, series 3 image 25       8 mm right lower lobe, posterior medial, series 3, image 36       8.1 mm, right lower lobe posterior and medial, series 3 image 41       18 mm, right lower lobe posterior medial, series 3 image 44       There are multiple additional small nodules throughout the right lung that measure 2 to 5 mm       Examples of nodules seen in the left lun mm, left lower lobe, series 3 image 37       10 mm,  left lower lobe lower lobe, series 3 image 38   6.5 mm, left lower lobe, series 3, image 40       Multiple small nodules measuring less than 5 mm are seen also.       In the right paratracheal region there is asymmetric soft tissue seen on the right that measures 2 cm. In no pathologically enlarged lymph nodes are seen. There is no evidence for aneurysm.       In the visualized upper abdomen there is evidence of cholecystectomy. A 14 mm hypodensity is seen in the posterior aspect of the left kidney. Degenerative changes are seen in the spine at multiple levels.           Impression   .       1. Multiple nodules are seen throughout both lungs. Most consistent with metastatic disease.       2. There is no evidence for consolidating pneumonia, pleural effusion or pneumothorax.       3.  No aneurysm or dissection is seen.    4. Asymmetric soft tissue is seen in the visualized portion of the neck on the right side of the trachea.           Both chest CT and PET scans reviewed personally by myself. Noted are several areas of uptake in bilateral lungs with largest noted nodule in RUL. Dr. Gustabo Kang is requesting ct needle biopsy of any of lung nodule for diagnosis if possible. ASSESSMENT AND PLAN:  Chart, medications, lab work reviewed. Diagnosis Orders   1. Abnormal PET scan, lung  Protime-Inr    CT NEEDLE BIOPSY LUNG PERCUTANEOUS    Surgical Pathology   2. Nodule of upper lobe of right lung  Protime-Inr    CT NEEDLE BIOPSY LUNG PERCUTANEOUS    Surgical Pathology   3. Abnormal CT of the chest  Protime-Inr    CT NEEDLE BIOPSY LUNG PERCUTANEOUS    Surgical Pathology   4. Papillary thyroid carcinoma ([facility] [address]  Protime-Inr    CT NEEDLE BIOPSY LUNG PERCUTANEOUS    Surgical Pathology          Plan:     Orders Placed This Encounter   Procedures    CT NEEDLE BIOPSY LUNG PERCUTANEOUS     Standing Status:   Future     Standing Expiration Date:   1/24/2023     Order Specific Question:   Reason for exam:     Answer:   RUL lung nodule    Protime-Inr     Standing Status:   Future     Number of Occurrences:   1     Standing Expiration Date:   1/24/2023     Order Specific Question:   Daily Coumadin Dose? Answer:   . ............ Sade Ramos  Surgical Pathology     Standing Status:   Future     Standing Expiration Date:   1/24/2023     Order Specific Question:   PREVIOUS BIOPSY     Answer:   Yes     Comments:   thyroid cancer     Order Specific Question:   PREOP DIAGNOSIS     Answer:   RUL lung nodule BX     Order Specific Question:   FROZEN SECTION - NO OR YES/SPECIMEN     Answer:   No      No orders of the defined types were placed in this encounter. --  Percutaneous CT-guided needle biopsy of right lung lesion with conscious sedation.   Procedure with risks including infection, bleeding, pain at site, pneumothorax with possible need for chest tube placement, lung collapse discussed with patient as well as possibility of inconclusive results, and with any procedure there is a risk of unforseen complications and/or reactions that could potentially lead to death. Patient wishes to proceed. --  Follow-up with referring Dr. Duarte Calderon pulmonology for pathology results. No further follow up care required in IR after biopsy. --  INR prior to procedure    Thank You Dr. Duarte Calderon for referral of your patient to our practice for care.      Saba Christianson, YUE - CNP

## 2022-01-25 ENCOUNTER — HOSPITAL ENCOUNTER (OUTPATIENT)
Dept: GENERAL RADIOLOGY | Age: 52
Discharge: HOME | End: 2022-01-27
Payer: COMMERCIAL

## 2022-01-25 ENCOUNTER — HOSPITAL ENCOUNTER (OUTPATIENT)
Dept: CT IMAGING | Age: 52
Discharge: HOME | End: 2022-01-27
Payer: COMMERCIAL

## 2022-01-25 VITALS
OXYGEN SATURATION: 97 % | HEART RATE: 72 BPM | RESPIRATION RATE: 6 BRPM | SYSTOLIC BLOOD PRESSURE: 155 MMHG | DIASTOLIC BLOOD PRESSURE: 94 MMHG

## 2022-01-25 DIAGNOSIS — R94.2 ABNORMAL PET SCAN, LUNG: ICD-10-CM

## 2022-01-25 DIAGNOSIS — R93.89 ABNORMAL CT OF THE CHEST: ICD-10-CM

## 2022-01-25 DIAGNOSIS — R91.1 NODULE OF UPPER LOBE OF RIGHT LUNG: ICD-10-CM

## 2022-01-25 DIAGNOSIS — C73 PAPILLARY THYROID CARCINOMA (HCC): ICD-10-CM

## 2022-01-25 PROCEDURE — 88305 TISSUE EXAM BY PATHOLOGIST: CPT

## 2022-01-25 PROCEDURE — 88342 IMHCHEM/IMCYTCHM 1ST ANTB: CPT

## 2022-01-25 PROCEDURE — 6370000000 HC RX 637 (ALT 250 FOR IP): Performed by: NURSE PRACTITIONER

## 2022-01-25 PROCEDURE — 71046 X-RAY EXAM CHEST 2 VIEWS: CPT | Performed by: RADIOLOGY

## 2022-01-25 PROCEDURE — 32408 CORE NDL BX LNG/MED PERQ: CPT

## 2022-01-25 PROCEDURE — 88341 IMHCHEM/IMCYTCHM EA ADD ANTB: CPT

## 2022-01-25 PROCEDURE — 71046 X-RAY EXAM CHEST 2 VIEWS: CPT

## 2022-01-25 PROCEDURE — 6360000002 HC RX W HCPCS: Performed by: NURSE PRACTITIONER

## 2022-01-25 PROCEDURE — 2580000003 HC RX 258: Performed by: NURSE PRACTITIONER

## 2022-01-25 PROCEDURE — 32408 CORE NDL BX LNG/MED PERQ: CPT | Performed by: RADIOLOGY

## 2022-01-25 PROCEDURE — 2500000003 HC RX 250 WO HCPCS: Performed by: NURSE PRACTITIONER

## 2022-01-25 RX ORDER — FENTANYL CITRATE 50 UG/ML
50 INJECTION, SOLUTION INTRAMUSCULAR; INTRAVENOUS
Status: DISCONTINUED | OUTPATIENT
Start: 2022-01-25 | End: 2022-01-28 | Stop reason: HOSPADM

## 2022-01-25 RX ORDER — MIDAZOLAM HYDROCHLORIDE 2 MG/2ML
0.5 INJECTION, SOLUTION INTRAMUSCULAR; INTRAVENOUS
Status: DISCONTINUED | OUTPATIENT
Start: 2022-01-25 | End: 2022-01-28 | Stop reason: HOSPADM

## 2022-01-25 RX ORDER — 0.9 % SODIUM CHLORIDE 0.9 %
250 INTRAVENOUS SOLUTION INTRAVENOUS
Status: DISCONTINUED | OUTPATIENT
Start: 2022-01-25 | End: 2022-01-28 | Stop reason: HOSPADM

## 2022-01-25 RX ORDER — LIDOCAINE HYDROCHLORIDE 20 MG/ML
10 INJECTION, SOLUTION INFILTRATION; PERINEURAL
Status: DISCONTINUED | OUTPATIENT
Start: 2022-01-25 | End: 2022-01-28 | Stop reason: HOSPADM

## 2022-01-25 RX ORDER — BACITRACIN, NEOMYCIN, POLYMYXIN B 400; 3.5; 5 [USP'U]/G; MG/G; [USP'U]/G
OINTMENT TOPICAL ONCE
Status: COMPLETED | OUTPATIENT
Start: 2022-01-25 | End: 2022-01-25

## 2022-01-25 RX ADMIN — FENTANYL CITRATE 50 MCG: 50 INJECTION INTRAMUSCULAR; INTRAVENOUS at 11:10

## 2022-01-25 RX ADMIN — BACITRACIN ZINC, NEOMYCIN SULFATE, POLYMYXIN B SULFATE: 3.5; 5000; 4 OINTMENT TOPICAL at 11:29

## 2022-01-25 RX ADMIN — SODIUM CHLORIDE 250 ML: 9 INJECTION, SOLUTION INTRAVENOUS at 11:03

## 2022-01-25 RX ADMIN — LIDOCAINE HYDROCHLORIDE 7 ML: 20 INJECTION, SOLUTION INFILTRATION; PERINEURAL at 11:18

## 2022-01-25 RX ADMIN — MIDAZOLAM 0.5 MG: 1 INJECTION INTRAMUSCULAR; INTRAVENOUS at 11:11

## 2022-01-25 ASSESSMENT — PAIN SCALES - GENERAL
PAINLEVEL_OUTOF10: 0
PAINLEVEL_OUTOF10: 0

## 2022-01-25 NOTE — PROGRESS NOTES
Pt returned to CT holding via cart. Pt A&Ox4, respirations even and unlabored, skin p/w/d. Pt denies SOB or pain. Right upper chest biopsy site soft, no crepitus, and dressing c/d/i. VSS, SR on monitor. Pt provided PO fluids and breakfast tray and tolereated well. Specimen taken to lab. Pt denies additional needs at this time, call light within reach. Pt wife to bedside. Electronically signed by Charles Higgins RN on 1/25/2022 at 11:57 AM  Pt taken via cart to xray for post right lung biopsy CXR by JESSY-RN. Pt condition stable. .Electronically signed by Charles Higgins RN on 1/25/2022 at 12:27 PM

## 2022-01-25 NOTE — PROGRESS NOTES
Pt ambulated, gait steady, back to CT holding. Pt A&Ox4, respirations even and unlabored, skin p/w/d. Pt c/o right side neck pain, chronic and states took Tylenol last night and no intervention needed at this time. Pt denies taking any blood thinners, INR 1.0. Pt confirms being NPO since MN. Pt changed into hospital gown independently. Pt connected to VS monitor, SR on monitor and VSS. #20 IV started in right AC, GBR, blood patch obtained, flushed well. Pt tolerated all well. Pt verbalizes understanding of procedure and risks discussed in the office with Josue Simmons NP yesterday, consent obtained. Dr. Curtis Coon notified pt ready in CT holding. Electronically signed by Fiona Sanchez RN on 1/25/2022 at 10:43 AM  Dr. Curtis Coon at bedside assessing pt and explaining procedure and risks again. Pt verbalizes understanding and denies questions. Pt to go to CT room 2 for procedure. Electronically signed by Fiona Sanchez RN on 1/25/2022 at 10:49 AM

## 2022-01-25 NOTE — FLOWSHEET NOTE
Patient returned from xray. Discharge instructions already reviewed with patient. Pt has no questions or concerns at this time. Awaiting post xray reading from Dr. Lucina Mar. Electronically signed by Nora Hilliard RN on 1/25/2022 at 12:48 PM    Post xray is good per Dr. Lucian Mar, pt dressing by self at this time. IV removed. Pt to be taken to car by RN. Pt wife to drive patient home.   Electronically signed by Nora Hilliard RN on 1/25/2022 at 12:54 PM

## 2022-01-25 NOTE — SEDATION DOCUMENTATION
SEDATION ADMINISTERED      Pt arrived to CT room 2 via ambulation, gait steady. Pt transferred self onto procedure table and placed in supine position. Pre images obtained via CT. Dr. Indiana De Anda arrived and reviewed images. Timeout complete for percutaneous cat scan guided needle biopsy of right lung lesion with conscious sedation. Conscious sedation medication given, see eMar.     marked site, right upper chest, using CT guidance. Site then prepped with large tinted chloraprep. Using sterile technique, Dr. Indiana De Anda draped site with sterile towels/fenestrated drape. Using CT guidance, Dr. Chang Share site with 2% lidocaine, see eMar.     Using CT guidance, with [employer] Biopsy System 20G x 16cm, Dr. Indiana De Anda obtained specimens. CT imaging used to confirm. First core biopsy obtained and placed into formalin. Second core biopsy obtained and placed into formalin. Dr. Indiana De Anda injected blood patch into biopsy site and then removed Bard system and applied pressure to site. Final CT images obtained. Dr. Indiana De Anda then placed neosporin ointment to biopsy site and covered with large band aid. Site soft, clean and dry with band aid intact. Pt to return to CT holding with CXR in one hour. Electronically signed by William Brunner RN on 1/25/2022 at 11:30 AM          TOTAL SEDATION ADMINISTERED: 50 mcg fentanyl IV                                                                0.5 mg versed IV

## 2022-01-26 ENCOUNTER — POST-OP TELEPHONE (OUTPATIENT)
Dept: INTERVENTIONAL RADIOLOGY/VASCULAR | Age: 52
End: 2022-01-26

## 2022-01-26 NOTE — PROGRESS NOTES
Spoke with pt post lung biopsy on 1/25. States he is feeling good, no issues  With breathing or at biopsy site support offered.

## 2022-02-01 ENCOUNTER — HOSPITAL ENCOUNTER (OUTPATIENT)
Dept: NUCLEAR MEDICINE | Age: 52
Discharge: HOME | End: 2022-02-03
Payer: COMMERCIAL

## 2022-02-01 DIAGNOSIS — C73 PAPILLARY THYROID CARCINOMA (HCC): ICD-10-CM

## 2022-02-01 PROCEDURE — 3430000000 HC RX DIAGNOSTIC RADIOPHARMACEUTICAL: Performed by: INTERNAL MEDICINE

## 2022-02-01 PROCEDURE — 79005 NUCLEAR RX ORAL ADMIN: CPT

## 2022-02-01 PROCEDURE — A9517 I131 IODIDE CAP, RX: HCPCS | Performed by: INTERNAL MEDICINE

## 2022-02-01 RX ADMIN — SODIUM IODIDE I 131 75 MILLICURIE: 1 CAPSULE, GELATIN COATED ORAL at 13:27

## 2022-02-04 ENCOUNTER — TELEPHONE (OUTPATIENT)
Dept: PULMONOLOGY | Age: 52
End: 2022-02-04

## 2022-02-04 DIAGNOSIS — C73 PAPILLARY THYROID CARCINOMA (HCC): Primary | ICD-10-CM

## 2022-02-04 DIAGNOSIS — R91.1 LUNG NODULE SEEN ON IMAGING STUDY: ICD-10-CM

## 2022-02-04 NOTE — TELEPHONE ENCOUNTER
With the patient, reviewed biopsy results with him, he already spoke with Dr. Smión Metzger, with the plan to refer patient to oncology for systemic treatment. Otherwise patient has no pulmonary symptoms, he will follow-up with me as needed.

## 2022-02-21 ENCOUNTER — OFFICE VISIT (OUTPATIENT)
Dept: ENDOCRINOLOGY | Age: 52
End: 2022-02-21
Payer: COMMERCIAL

## 2022-02-21 VITALS
HEART RATE: 86 BPM | HEIGHT: 71 IN | OXYGEN SATURATION: 97 % | SYSTOLIC BLOOD PRESSURE: 137 MMHG | DIASTOLIC BLOOD PRESSURE: 94 MMHG | BODY MASS INDEX: 27.3 KG/M2 | WEIGHT: 195 LBS

## 2022-02-21 DIAGNOSIS — E89.0 POSTOPERATIVE HYPOTHYROIDISM: ICD-10-CM

## 2022-02-21 DIAGNOSIS — C73 PAPILLARY THYROID CARCINOMA (HCC): Primary | ICD-10-CM

## 2022-02-21 DIAGNOSIS — R53.83 FATIGUE, UNSPECIFIED TYPE: ICD-10-CM

## 2022-02-21 PROCEDURE — 99213 OFFICE O/P EST LOW 20 MIN: CPT | Performed by: INTERNAL MEDICINE

## 2022-02-21 ASSESSMENT — ENCOUNTER SYMPTOMS
SWOLLEN GLANDS: 0
VOICE CHANGE: 1
VISUAL CHANGE: 1

## 2022-02-21 NOTE — PROGRESS NOTES
2/21/2022    Assessment:       Diagnosis Orders   1. Papillary thyroid carcinoma ([address]     2. Postoperative hypothyroidism     3.  Fatigue, unspecified type           PLAN:     Continue current dose of levothyroxine 137 mcg daily repeat thyroid function test thyroglobulin thyroglobulin antibody  Also get cortisol testosterone level  Patient to follow-up with [facility] in [place] for further management of his thyroid cancer    Orders Placed This Encounter   Procedures    TSH with Reflex     Standing Status:   Future     Standing Expiration Date:   2/21/2023    T4, Free     Standing Status:   Future     Standing Expiration Date:   2/21/2023    Thyroglobulin     Standing Status:   Future     Standing Expiration Date:   2/21/2023    Anti-Thyroglobulin Antibody     Standing Status:   Future     Standing Expiration Date:   2/21/2023    Testosterone, free, total     Standing Status:   Future     Standing Expiration Date:   2/21/2023    Cortisol Total     Standing Status:   Future     Standing Expiration Date:   2/21/2023     Order Specific Question:   8AM or 4PM?     Answer:   random       Subjective:     Chief Complaint   Patient presents with    Hypothyroidism     Papillary thyroid carcinoma      Vitals:    02/21/22 1519 02/21/22 1523   BP: (!) 137/94 (!) 137/94   Pulse: 86    SpO2: 97%    Weight: 195 lb (88.5 kg)    Height: 5' 11\" (1.803 m)      Wt Readings from Last 3 Encounters:   02/21/22 195 lb (88.5 kg)   01/24/22 196 lb (88.9 kg)   01/24/22 196 lb 3.2 oz (89 kg)     BP Readings from Last 3 Encounters:   02/21/22 (!) 137/94   01/25/22 (!) 155/94   01/24/22 (!) 144/84     Follow-up on hypothyroidism status post thyroidectomy for papillary thyroid cancer with metastatic disease to lymph nodes as well as lungs thyroid uptake and scan done shows no uptake in the lungs even though biopsy of lung nodule did show a papillary thyroid carcinoma  Patient was treated with  Patient is thyroglobulin level was so far stable at 1 patient energy has improved still has hoarseness after his thyroidectomy denies any new lymph node enlargement does complain of fatigue has lost some weight  Patient might be needing had oral chemotherapy for thyroid carcinoma  LEN[facility]IMA through [facility]  Was recently seen by oncologist to Dr. Osman Naylor at [address] [place] [address]    Other  This is a recurrent (Hypothyroidism) problem. The current episode started more than 1 month ago. The problem occurs intermittently. The problem has been gradually improving. Associated symptoms include fatigue and a visual change. Pertinent negatives include no neck pain or swollen glands. Exacerbated by: Thyroidectomy. Treatments tried: Levothyroxine. The treatment provided moderate relief. I-131 TREATMENT (1/21/2022 1:48 PM):       CLINICAL HISTORY: 46years year old Male  ...       TECHNIQUE: 4.8 mCi I-131 (sodium iodide) was given orally.       TREATMENT:        The risks, benefits, alternatives and precautions of I-131 therapy were discussed with the patient who understood and agreed to proceed with I-131 therapy.  Patient signed an electronic informed consent, received a written set of instructions for    radiation safety and agreed to follow these instructions. The patient had the opportunity to ask questions and voiced understanding of the procedure.       4.8 mCi I-131 (Sodium Iodide) was given orally.       Post radiotherapy imaging was obtained at 48 and 72 hours. Imaging at 72 hours showed minimal physiologic uptake within salivary glands and urinary bladder. Focal uptake at 72 hours was identified in region of the left thyroid bed. No distant abnormal    uptake was identified.           Impression       I-131 treatment with focal uptake left neck at 72 hours as discussed.          NM RADIOPHARM THERAPY ORAL : 1/31/2022       CLINICAL HISTORY: C73 Papillary thyroid carcinoma (HCC) ICD10.       COMPARISON: 1/20/2022.       PROCEDURE: The patient was observed swallowing approximately 75.0 mCi of iodine-131 by capsule for thyroid ablation.       No imaging was performed.           Impression       75 MCI OF IODINE-131 FOR THYROID CARCINOMA ABLATION.       EXAMINATION: NM THYROID UPTAKE AND SCAN       DATE AND TIME:1/17/2022 9:15 AM       CLINICAL HISTORY: Resection of right neck mass. Left thyroidectomy.  C73 Papillary thyroid carcinoma (HCC) ICD10        COMPARISON: None available.       TECHNIQUE: Patient ingested 258 uCi of sodium I-123.       FINDINGS: There is faint equivocal I-123 activity in the thyroid bed. 24 hour estimate of the percent uptake of the administered dose is 0.8% which is not inconsistent with background counts.           Impression   NO SIGNIFICANT I-123 UPTAKE.         Results for Linda Coil (MRN [id number]) as of 2/21/2022 16:03   Ref.  Range 1/11/2022 09:39   TSH Latest Ref Range: 0.440 - 3.860 uIU/mL 83.840 (H)   T4 Free Latest Ref Range: 0.84 - 1.68 ng/dL 0.22 (L)   Thyroglobulin by LC-MS/MS, Serum/Plasma Latest Ref Range: 1.3 - 31.8 ng/mL 1.0 (L)       Past Medical History:   Diagnosis Date    Acquired hypothyroidism 12/20/2017    meds > 10 yrs    Anxiety 2008    Benign non-nodular prostatic hyperplasia without lower urinary tract symptoms 3/3/2016    Chronic bilateral low back pain 3/29/2019    History of 2019 novel coronavirus disease (COVID-19) 9/10/2021    Hypertriglyceridemia 8/25/2020    high triglycerides -- diet control    Leukopenia 7/24/2012    PONV (postoperative nausea and vomiting)     severe n/v with choli     Past Surgical History:   Procedure Laterality Date    CHOLECYSTECTOMY, LAPAROSCOPIC  2010    COLONOSCOPY  06/20/2018    int hemorr, 10y repeat (DR SCHERER)    CT NEEDLE BIOPSY LUNG PERCUTANEOUS Right 01/25/2022    performed by Dr. Jacobo August 1/25/2022    CT NEEDLE BIOPSY LUNG PERCUTANEOUS 1/25/2022 [facility] CT SCAN    NECK SURGERY Right 11/23/2021    Deep jugular node dissection with removal of right jovanny mass conglomerate (DR CERVANTES)    NECK SURGERY Right 2021    REMOVAL OF RIGHT NECK MASS performed by Rosa Maria Shipley MD at [place] [address]  2009    gastritis    VASECTOMY  2007     Social History     Socioeconomic History    Marital status:      Spouse name: Adele    Number of children: 2    Years of education: Not on file    Highest education level: Not on file   Occupational History    Occupation: nursing director     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   Tobacco Use    Smoking status: Former Smoker     Packs/day: 0.30     Years: 5.00     Pack years: 1.50     Types: Cigarettes     Start date:      Quit date: 2003     Years since quittin.1    Smokeless tobacco: Never Used   Vaping Use    Vaping Use: Never used   Substance and Sexual Activity    Alcohol use: Yes     Comment: social    Drug use: No    Sexual activity: Yes     Partners: Female     Comment: monogamous   Other Topics Concern    Not on file   Social History Narrative    Lives with wife and children        1 dog        Hobbies: reading, outdoor work     Social Determinants of Health     Financial Resource Strain: Low Risk     Difficulty of Paying Living Expenses: Not hard at all   Food Insecurity: No Food Insecurity    Worried About [facility] in the Last Year: Never true    [address] [facility] [facility] [address] in the Last Year: Never true   Transportation Needs:     Lack of Transportation (Medical): Not on file    Lack of Transportation (Non-Medical):  Not on file   Physical Activity:     Days of Exercise per Week: Not on file    Minutes of Exercise per Session: Not on file   Stress:     Feeling of Stress : Not on file   Social Connections:     Frequency of Communication with Friends and Family: Not on file    Frequency of Social Gatherings with Friends and Family: Not on file    Attends Hoahaoism Services: Not on file  Active Member of Clubs or Organizations: Not on file    Attends Club or Organization Meetings: Not on file    Marital Status: Not on file   Intimate Partner Violence:     Fear of Current or Ex-Partner: Not on file    Emotionally Abused: Not on file    Physically Abused: Not on file    Sexually Abused: Not on file   Housing Stability:     Unable to Pay for Housing in the Last Year: Not on file    Number of Places Lived in the Last Year: Not on file    Unstable Housing in the Last Year: Not on file     Family History   Problem Relation Age of Onset    Diabetes Mother    Atkins Anemia Mother     High Blood Pressure Mother     Kidney Disease Mother     Arthritis Mother     Asthma Mother     Cervical Cancer Mother 43    Heart Failure Mother     High Cholesterol Mother     High Blood Pressure Father     Diabetes Father     Cirrhosis Father         Hep C    Mental Retardation Maternal Uncle     Diabetes type 2  Brother     No Known Problems Maternal Grandmother     No Known Problems Maternal Grandfather     No Known Problems Paternal Grandmother     No Known Problems Paternal Grandfather     No Known Problems Brother     No Known Problems Daughter     No Known Problems Son      Allergies   Allergen Reactions    Iv Dye [Iodides] Anaphylaxis    Iodine Other (See Comments)    Codeine Nausea And Vomiting       Current Outpatient Medications:     Multiple Vitamins-Minerals (THERAPEUTIC MULTIVITAMIN-MINERALS) tablet, Take 1 tablet by mouth daily , Disp: , Rfl:     ibuprofen (ADVIL;MOTRIN) 200 MG tablet, Take 200 mg by mouth every 6 hours as needed for Pain, Disp: , Rfl:     levothyroxine (SYNTHROID) 137 MCG tablet, Take 1 tablet by mouth Daily, Disp: 90 tablet, Rfl: 1    diphenhydrAMINE (BENADRYL) 25 MG capsule, Take 2 capsules by mouth every 4 hours as needed for Itching (swelling), Disp: 20 capsule, Rfl: 0    acetaminophen (TYLENOL) 325 MG tablet, Take 325 mg by mouth every 6 hours as needed. OTC  , Disp: , Rfl:   Lab Results   Component Value Date     01/02/2022    K 3.7 01/02/2022     01/02/2022    CO2 26 11/16/2021    BUN 19 11/16/2021    CREATININE 1.03 01/02/2022    GLUCOSE 113 (H) 01/02/2022    CALCIUM 11.0 (H) 01/02/2022    PROT 7.4 01/02/2022    LABALBU 4.7 01/02/2022    BILITOT 0.9 01/02/2022    ALKPHOS 53 01/02/2022    AST 21 01/02/2022    ALT 18 01/02/2022    LABGLOM >60 01/02/2022    GFRAA >60 01/02/2022    GLOB 2.4 09/07/2021     Lab Results   Component Value Date    WBC 8.7 01/02/2022    HGB 14.2 01/02/2022    HCT 41.0 01/02/2022    MCV 88 01/02/2022     01/02/2022     No results found for: LABA1C  Lab Results   Component Value Date    HDL 43 09/07/2021    HDL 40 06/25/2021    HDL 34 (L) 07/31/2020    LDLCALC 87 09/07/2021    LDLCALC 77 06/25/2021    LDLCALC 83 07/31/2020    CHOL 156 09/07/2021    CHOL 139 06/25/2021    CHOL 147 07/31/2020    TRIG 131 09/07/2021    TRIG 109 06/25/2021    TRIG 152 (H) 07/31/2020     Lab Results   Component Value Date    TESTM 598 12/20/2017     Lab Results   Component Value Date    TSH 4.760 (H) 09/07/2021    TSH 3.020 07/17/2020    TSH 1.450 04/01/2019    TSHREFLEX 83.840 (H) 01/11/2022    T4FREE 0.22 (L) 01/11/2022    T4FREE 1.28 09/07/2021    T4FREE 1.57 07/17/2020     No results found for: TPOABS    Review of Systems   Constitutional: Positive for fatigue and unexpected weight change. HENT: Positive for voice change. Musculoskeletal: Negative for neck pain. Objective:   Physical Exam  Vitals reviewed. Constitutional:       Appearance: Normal appearance. He is obese. HENT:      Head: Normocephalic and atraumatic. Hair is normal.      Right Ear: External ear normal.      Left Ear: External ear normal.      Nose: Nose normal.   Eyes:      General: No scleral icterus. Right eye: No discharge. Left eye: No discharge. Extraocular Movements: Extraocular movements intact.       Conjunctiva/sclera: Conjunctivae normal.   Neck:      Trachea: Trachea normal.     Cardiovascular:      Rate and Rhythm: Normal rate. Pulmonary:      Effort: Pulmonary effort is normal.   Musculoskeletal:         General: Normal range of motion. Cervical back: Normal range of motion and neck supple. Neurological:      General: No focal deficit present. Mental Status: He is alert and oriented to person, place, and time.    Psychiatric:         Mood and Affect: Mood normal.         Behavior: Behavior normal.

## 2022-02-24 ENCOUNTER — OFFICE VISIT (OUTPATIENT)
Dept: FAMILY MEDICINE CLINIC | Age: 52
End: 2022-02-24
Payer: COMMERCIAL

## 2022-02-24 ENCOUNTER — HOSPITAL ENCOUNTER (OUTPATIENT)
Dept: LAB | Age: 52
Discharge: HOME | End: 2022-02-24
Payer: COMMERCIAL

## 2022-02-24 VITALS
HEIGHT: 71 IN | HEART RATE: 73 BPM | WEIGHT: 195 LBS | SYSTOLIC BLOOD PRESSURE: 138 MMHG | DIASTOLIC BLOOD PRESSURE: 88 MMHG | OXYGEN SATURATION: 97 % | BODY MASS INDEX: 27.3 KG/M2 | TEMPERATURE: 97.4 F

## 2022-02-24 DIAGNOSIS — Z01.818 PRE-OP EXAMINATION: Primary | ICD-10-CM

## 2022-02-24 DIAGNOSIS — Z01.818 PRE-OP EXAMINATION: ICD-10-CM

## 2022-02-24 PROBLEM — C73 PAPILLARY CARCINOMA OF THYROID (HCC): Status: ACTIVE | Noted: 2022-02-24

## 2022-02-24 LAB
ANION GAP SERPL CALCULATED.3IONS-SCNC: 14 MEQ/L (ref 9–15)
BUN BLDV-MCNC: 14 MG/DL (ref 6–20)
CALCIUM SERPL-MCNC: 8.9 MG/DL (ref 8.5–9.9)
CHLORIDE BLD-SCNC: 105 MEQ/L (ref 95–107)
CO2: 25 MEQ/L (ref 20–31)
CREAT SERPL-MCNC: 0.99 MG/DL (ref 0.7–1.2)
GFR AFRICAN AMERICAN: >60
GFR NON-AFRICAN AMERICAN: >60
GLUCOSE BLD-MCNC: 122 MG/DL (ref 70–99)
HCT VFR BLD CALC: 37.2 % (ref 42–52)
HEMOGLOBIN: 13 G/DL (ref 14–18)
MCH RBC QN AUTO: 31.9 PG (ref 27–31.3)
MCHC RBC AUTO-ENTMCNC: 35 % (ref 33–37)
MCV RBC AUTO: 91.1 FL (ref 80–100)
PDW BLD-RTO: 15 % (ref 11.5–14.5)
PLATELET # BLD: 206 K/UL (ref 130–400)
POTASSIUM SERPL-SCNC: 4.1 MEQ/L (ref 3.4–4.9)
RBC # BLD: 4.08 M/UL (ref 4.7–6.1)
SODIUM BLD-SCNC: 144 MEQ/L (ref 135–144)
WBC # BLD: 5.7 K/UL (ref 4.8–10.8)

## 2022-02-24 PROCEDURE — 36415 COLL VENOUS BLD VENIPUNCTURE: CPT

## 2022-02-24 PROCEDURE — 80048 BASIC METABOLIC PNL TOTAL CA: CPT

## 2022-02-24 PROCEDURE — 99213 OFFICE O/P EST LOW 20 MIN: CPT | Performed by: NURSE PRACTITIONER

## 2022-02-24 PROCEDURE — 85027 COMPLETE CBC AUTOMATED: CPT

## 2022-02-24 NOTE — PROGRESS NOTES
Preoperative Consultation      Hedy Zabala  YOB: 1970    Date of Service:  2/24/2022    Vitals:    02/24/22 1105   BP: 138/88   Site: Left Upper Arm   Position: Sitting   Cuff Size: Small Adult   Pulse: 73   Temp: 97.4 °F (36.3 °C)   SpO2: 97%   Weight: 195 lb (88.5 kg)   Height: 5' 11\" (1.803 m)      Wt Readings from Last 2 Encounters:   02/24/22 195 lb (88.5 kg)   02/21/22 195 lb (88.5 kg)     BP Readings from Last 3 Encounters:   02/24/22 138/88   02/21/22 (!) 137/94   01/25/22 (!) 155/94        Chief Complaint   Patient presents with   [facility] Pre-op Exam     Patient is here c/o pre-op examination. Allergies   Allergen Reactions    Iv Dye [Iodides] Anaphylaxis    Iodine Other (See Comments)    Codeine Nausea And Vomiting     Outpatient Medications Marked as Taking for the 2/24/22 encounter (Office Visit) with Ameirca Dash. YUE Fu - CNP   Medication Sig Dispense Refill    mupirocin (BACTROBAN) 2 % ointment       Multiple Vitamins-Minerals (THERAPEUTIC MULTIVITAMIN-MINERALS) tablet Take 1 tablet by mouth daily       ibuprofen (ADVIL;MOTRIN) 200 MG tablet Take 200 mg by mouth every 6 hours as needed for Pain      levothyroxine (SYNTHROID) 137 MCG tablet Take 1 tablet by mouth Daily 90 tablet 1    diphenhydrAMINE (BENADRYL) 25 MG capsule Take 2 capsules by mouth every 4 hours as needed for Itching (swelling) 20 capsule 0    acetaminophen (TYLENOL) 325 MG tablet Take 325 mg by mouth every 6 hours as needed. OTC            This patient presents to the office today for a preoperative consultation at the request of surgeon, Dr. Jeanie Madrid, who plans on performing Wade vocal cord injection on March 1 [facility]. The current problem began 3 months ago, and symptoms have been worsening with time. Conservative therapy: Yes: watchful waiting, which has been ineffective.     Planned anesthesia: Choice   Known anesthesia problems: Past general anesthesia with complications- PONV, states the scalpo patch helping    Bleeding risk: No recent or remote history of abnormal bleeding  Personal or FH of DVT/PE: No    Patient objection to receiving blood products: No    Patient Active Problem List   Diagnosis    Benign non-nodular prostatic hyperplasia without lower urinary tract symptoms    Acquired hypothyroidism    Chronic bilateral low back pain    Hypertriglyceridemia    History of 2019 novel coronavirus disease (COVID-19)    Mass of right side of neck       Past Medical History:   Diagnosis Date    Acquired hypothyroidism 12/20/2017    meds > 10 yrs    Anxiety 2008    Benign non-nodular prostatic hyperplasia without lower urinary tract symptoms 3/3/2016    Chronic bilateral low back pain 3/29/2019    History of 2019 novel coronavirus disease (COVID-19) 9/10/2021    Hypertriglyceridemia 8/25/2020    high triglycerides -- diet control    Leukopenia 7/24/2012    PONV (postoperative nausea and vomiting)     severe n/v with choli     Past Surgical History:   Procedure Laterality Date    CHOLECYSTECTOMY, LAPAROSCOPIC  2010    COLONOSCOPY  06/20/2018    int hemorr, 10y repeat (DR SCHERER)    CT NEEDLE BIOPSY LUNG PERCUTANEOUS Right 01/25/2022    performed by Dr. Beto Olivo  1/25/2022    CT NEEDLE BIOPSY LUNG PERCUTANEOUS 1/25/2022 [facility] CT SCAN    NECK SURGERY Right 11/23/2021    Deep jugular node dissection with removal of right jovanny mass conglomerate (DR CERVANTES)    NECK SURGERY Right 11/23/2021    REMOVAL OF RIGHT NECK MASS performed by Simón Gallardo MD at [address]  2009    gastritis    VASECTOMY  2007     Family History   Problem Relation Age of Onset    Diabetes Mother     Anemia Mother     High Blood Pressure Mother     Kidney Disease Mother     Arthritis Mother     Asthma Mother     Cervical Cancer Mother 43    Heart Failure Mother     High Cholesterol Mother     High Blood Pressure Father     Diabetes Father    Duana Big Frequency of Social Gatherings with Friends and Family: Not on file    Attends Oriental orthodox Services: Not on file    Active Member of Clubs or Organizations: Not on file    Attends Club or Organization Meetings: Not on file    Marital Status: Not on file   Intimate Partner Violence:     Fear of Current or Ex-Partner: Not on file    Emotionally Abused: Not on file    Physically Abused: Not on file    Sexually Abused: Not on file   Housing Stability:     Unable to Pay for Housing in the Last Year: Not on file    Number of [place] in the Last Year: Not on file    Unstable Housing in the Last Year: Not on file       Review of Systems  A comprehensive review of systems was negative except for: Ears, nose, mouth, throat, and face: positive for nasal sores from radiation  Hematologic/lymphatic: positive for lymphadenopathy right axillary      Physical Exam   Constitutional: He is oriented to person, place, and time. He appears well-developed and well-nourished. No distress. HENT:   Head: Normocephalic and atraumatic. Mouth/Throat: Uvula is midline, oropharynx is clear and moist and mucous membranes are normal.   Eyes: Conjunctivae and EOM are normal. Pupils are equal, round, and reactive to light. Neck: Trachea normal and normal range of motion. Neck supple. No JVD present. Carotid bruit is not present. No mass and no thyromegaly present. Cardiovascular: Normal rate, regular rhythm, normal heart sounds and intact distal pulses. Exam reveals no gallop and no friction rub. No murmur heard. Pulmonary/Chest: Effort normal and breath sounds normal. No respiratory distress. He has no wheezes. He has no rales. Abdominal: Soft. Normal aorta and bowel sounds are normal. He exhibits no distension and no mass. There is no hepatosplenomegaly. No tenderness. Musculoskeletal: He exhibits no edema and no tenderness. Neurological: He is alert and oriented to person, place, and time. He has normal strength.  No cranial nerve deficit or sensory deficit. Coordination and gait normal.   Skin: Skin is warm and dry. No rash noted. No erythema. Psychiatric: He has a normal mood and affect. His behavior is normal.     EKG Interpretation:  normal sinus rhythm. Lab Review   Orders Only on 01/24/2022   Component Date Value    Protime 01/24/2022 13.0     INR 01/24/2022 1.0    Hospital Outpatient Visit on 01/14/2022   Component Date Value    Meter Glucose 01/14/2022 [facility] Outpatient Visit on 01/11/2022   Component Date Value    TSH 01/11/2022 83.840*    T4 Free 01/11/2022 0.22*    Thyroglobulin Antibody 01/11/2022 >4,794.0*    Thyroglobulin by LC-MS/M* 01/11/2022 1[facility] Outpatient Visit on 12/27/2021   Component Date Value    SARS-CoV-2, NAAT 12/27/2021 Not Detected            Assessment:       46 y.o. patient with planned surgery as above. Known risk factors for perioperative complications: Anesthesia concerns- PONV  Current medications which may produce withdrawal symptoms if withheld perioperatively: none      Plan:     1. Preoperative workup as follows: ECG, hemoglobin, hematocrit, electrolytes, creatinine  2. Change in medication regimen before surgery: Hold all medications on morning of surgery, Discontinue NSAIDs (Ibuprofen) 7 days before surgery  3.  Prophylaxis for cardiac events with perioperative beta-blockers: Not indicated  ACC/AHA indications for pre-operative beta-blocker use:    · Vascular surgery with history of postitive stress test  · Intermediate or high risk surgery with history of CAD   · Intermediate or high risk surgery with multiple clinical predictors of CAD- 2 of the following: history of compensated or prior heart failure, history of cerebrovascular disease, DM, or renal insufficiency    Routine administration of higher-dose, long-acting metoprolol in beta-blockernaïve patients on the day of surgery, and in the absence of dose titration is associated with an overall increase in mortality. Beta-blockers should be started days to weeks prior to surgery and titrated to pulse < 70.  4. Deep vein thrombosis prophylaxis: regimen to be chosen by surgical team  5.  Please review the above to proceed with surgery

## 2022-03-01 ENCOUNTER — ANESTHESIA (OUTPATIENT)
Dept: OPERATING ROOM | Age: 52
End: 2022-03-01
Payer: COMMERCIAL

## 2022-03-01 ENCOUNTER — HOSPITAL ENCOUNTER (OUTPATIENT)
Age: 52
Setting detail: OUTPATIENT SURGERY
Discharge: HOME | End: 2022-03-01
Attending: OTOLARYNGOLOGY | Admitting: OTOLARYNGOLOGY
Payer: COMMERCIAL

## 2022-03-01 ENCOUNTER — ANESTHESIA EVENT (OUTPATIENT)
Dept: OPERATING ROOM | Age: 52
End: 2022-03-01
Payer: COMMERCIAL

## 2022-03-01 VITALS
SYSTOLIC BLOOD PRESSURE: 131 MMHG | HEIGHT: 71 IN | TEMPERATURE: 98.1 F | WEIGHT: 195 LBS | OXYGEN SATURATION: 93 % | BODY MASS INDEX: 27.3 KG/M2 | RESPIRATION RATE: 16 BRPM | HEART RATE: 89 BPM | DIASTOLIC BLOOD PRESSURE: 74 MMHG

## 2022-03-01 VITALS
DIASTOLIC BLOOD PRESSURE: 52 MMHG | RESPIRATION RATE: 25 BRPM | SYSTOLIC BLOOD PRESSURE: 90 MMHG | OXYGEN SATURATION: 96 %

## 2022-03-01 PROCEDURE — 88305 TISSUE EXAM BY PATHOLOGIST: CPT

## 2022-03-01 PROCEDURE — 3600000003 HC SURGERY LEVEL 3 BASE: Performed by: OTOLARYNGOLOGY

## 2022-03-01 PROCEDURE — 7100000001 HC PACU RECOVERY - ADDTL 15 MIN: Performed by: OTOLARYNGOLOGY

## 2022-03-01 PROCEDURE — 6360000002 HC RX W HCPCS: Performed by: NURSE ANESTHETIST, CERTIFIED REGISTERED

## 2022-03-01 PROCEDURE — 2500000003 HC RX 250 WO HCPCS: Performed by: NURSE ANESTHETIST, CERTIFIED REGISTERED

## 2022-03-01 PROCEDURE — 7100000000 HC PACU RECOVERY - FIRST 15 MIN: Performed by: OTOLARYNGOLOGY

## 2022-03-01 PROCEDURE — 6360000002 HC RX W HCPCS: Performed by: STUDENT IN AN ORGANIZED HEALTH CARE EDUCATION/TRAINING PROGRAM

## 2022-03-01 PROCEDURE — 7100000010 HC PHASE II RECOVERY - FIRST 15 MIN: Performed by: OTOLARYNGOLOGY

## 2022-03-01 PROCEDURE — 2709999900 HC NON-CHARGEABLE SUPPLY: Performed by: OTOLARYNGOLOGY

## 2022-03-01 PROCEDURE — 6370000000 HC RX 637 (ALT 250 FOR IP): Performed by: STUDENT IN AN ORGANIZED HEALTH CARE EDUCATION/TRAINING PROGRAM

## 2022-03-01 PROCEDURE — 2580000003 HC RX 258: Performed by: STUDENT IN AN ORGANIZED HEALTH CARE EDUCATION/TRAINING PROGRAM

## 2022-03-01 PROCEDURE — 6360000002 HC RX W HCPCS: Performed by: OTOLARYNGOLOGY

## 2022-03-01 PROCEDURE — 3600000013 HC SURGERY LEVEL 3 ADDTL 15MIN: Performed by: OTOLARYNGOLOGY

## 2022-03-01 PROCEDURE — 6370000000 HC RX 637 (ALT 250 FOR IP): Performed by: OTOLARYNGOLOGY

## 2022-03-01 PROCEDURE — A4217 STERILE WATER/SALINE, 500 ML: HCPCS | Performed by: OTOLARYNGOLOGY

## 2022-03-01 PROCEDURE — 2580000003 HC RX 258: Performed by: OTOLARYNGOLOGY

## 2022-03-01 PROCEDURE — 7100000011 HC PHASE II RECOVERY - ADDTL 15 MIN: Performed by: OTOLARYNGOLOGY

## 2022-03-01 PROCEDURE — 3700000001 HC ADD 15 MINUTES (ANESTHESIA): Performed by: OTOLARYNGOLOGY

## 2022-03-01 PROCEDURE — 3700000000 HC ANESTHESIA ATTENDED CARE: Performed by: OTOLARYNGOLOGY

## 2022-03-01 PROCEDURE — 2500000003 HC RX 250 WO HCPCS: Performed by: OTOLARYNGOLOGY

## 2022-03-01 DEVICE — PROLARYN GEL IS A WATER-BASED INJECTABLE GEL IMPLANT USED IN THE VOCAL FOLDS TO TREAT VOCAL FOLD INSUFFIENCY. PROLARYN GEL RESORBS WITHIN A PERIOD OF 3-6 MONTHS AND IS A TEMPORARY IMPLANT.
Type: IMPLANTABLE DEVICE | Site: VOCAL CORD | Status: FUNCTIONAL
Brand: PROLARYN GEL INJECTABLE IMPLANT

## 2022-03-01 RX ORDER — MAGNESIUM HYDROXIDE 1200 MG/15ML
LIQUID ORAL CONTINUOUS PRN
Status: COMPLETED | OUTPATIENT
Start: 2022-03-01 | End: 2022-03-01

## 2022-03-01 RX ORDER — ACETAMINOPHEN 325 MG/1
650 TABLET ORAL ONCE
Status: COMPLETED | OUTPATIENT
Start: 2022-03-01 | End: 2022-03-01

## 2022-03-01 RX ORDER — FENTANYL CITRATE 50 UG/ML
INJECTION, SOLUTION INTRAMUSCULAR; INTRAVENOUS PRN
Status: DISCONTINUED | OUTPATIENT
Start: 2022-03-01 | End: 2022-03-01 | Stop reason: SDUPTHER

## 2022-03-01 RX ORDER — SUCCINYLCHOLINE/SOD CL,ISO/PF 100 MG/5ML
SYRINGE (ML) INTRAVENOUS PRN
Status: DISCONTINUED | OUTPATIENT
Start: 2022-03-01 | End: 2022-03-01 | Stop reason: SDUPTHER

## 2022-03-01 RX ORDER — MIDAZOLAM HYDROCHLORIDE 1 MG/ML
INJECTION INTRAMUSCULAR; INTRAVENOUS PRN
Status: DISCONTINUED | OUTPATIENT
Start: 2022-03-01 | End: 2022-03-01 | Stop reason: SDUPTHER

## 2022-03-01 RX ORDER — LIDOCAINE HYDROCHLORIDE AND EPINEPHRINE 10; 10 MG/ML; UG/ML
INJECTION, SOLUTION INFILTRATION; PERINEURAL PRN
Status: DISCONTINUED | OUTPATIENT
Start: 2022-03-01 | End: 2022-03-01 | Stop reason: ALTCHOICE

## 2022-03-01 RX ORDER — ONDANSETRON 2 MG/ML
4 INJECTION INTRAMUSCULAR; INTRAVENOUS
Status: COMPLETED | OUTPATIENT
Start: 2022-03-01 | End: 2022-03-01

## 2022-03-01 RX ORDER — DIPHENHYDRAMINE HYDROCHLORIDE 50 MG/ML
12.5 INJECTION INTRAMUSCULAR; INTRAVENOUS
Status: DISCONTINUED | OUTPATIENT
Start: 2022-03-01 | End: 2022-03-01 | Stop reason: HOSPADM

## 2022-03-01 RX ORDER — MEPERIDINE HYDROCHLORIDE 25 MG/ML
12.5 INJECTION INTRAMUSCULAR; INTRAVENOUS; SUBCUTANEOUS EVERY 5 MIN PRN
Status: DISCONTINUED | OUTPATIENT
Start: 2022-03-01 | End: 2022-03-01 | Stop reason: HOSPADM

## 2022-03-01 RX ORDER — DEXAMETHASONE SODIUM PHOSPHATE 10 MG/ML
INJECTION INTRAMUSCULAR; INTRAVENOUS PRN
Status: DISCONTINUED | OUTPATIENT
Start: 2022-03-01 | End: 2022-03-01 | Stop reason: SDUPTHER

## 2022-03-01 RX ORDER — OXYCODONE HYDROCHLORIDE 5 MG/1
10 TABLET ORAL PRN
Status: DISCONTINUED | OUTPATIENT
Start: 2022-03-01 | End: 2022-03-01 | Stop reason: HOSPADM

## 2022-03-01 RX ORDER — FENTANYL CITRATE 50 UG/ML
50 INJECTION, SOLUTION INTRAMUSCULAR; INTRAVENOUS EVERY 10 MIN PRN
Status: DISCONTINUED | OUTPATIENT
Start: 2022-03-01 | End: 2022-03-01 | Stop reason: HOSPADM

## 2022-03-01 RX ORDER — SODIUM CHLORIDE, SODIUM LACTATE, POTASSIUM CHLORIDE, CALCIUM CHLORIDE 600; 310; 30; 20 MG/100ML; MG/100ML; MG/100ML; MG/100ML
INJECTION, SOLUTION INTRAVENOUS CONTINUOUS
Status: DISCONTINUED | OUTPATIENT
Start: 2022-03-01 | End: 2022-03-01 | Stop reason: HOSPADM

## 2022-03-01 RX ORDER — OXYCODONE HYDROCHLORIDE 5 MG/1
5 TABLET ORAL PRN
Status: DISCONTINUED | OUTPATIENT
Start: 2022-03-01 | End: 2022-03-01 | Stop reason: HOSPADM

## 2022-03-01 RX ORDER — SODIUM CHLORIDE 9 MG/ML
25 INJECTION, SOLUTION INTRAVENOUS PRN
Status: DISCONTINUED | OUTPATIENT
Start: 2022-03-01 | End: 2022-03-01 | Stop reason: HOSPADM

## 2022-03-01 RX ORDER — SODIUM CHLORIDE 0.9 % (FLUSH) 0.9 %
5-40 SYRINGE (ML) INJECTION EVERY 12 HOURS SCHEDULED
Status: DISCONTINUED | OUTPATIENT
Start: 2022-03-01 | End: 2022-03-01 | Stop reason: HOSPADM

## 2022-03-01 RX ORDER — PROPOFOL 10 MG/ML
INJECTION, EMULSION INTRAVENOUS CONTINUOUS PRN
Status: DISCONTINUED | OUTPATIENT
Start: 2022-03-01 | End: 2022-03-01 | Stop reason: SDUPTHER

## 2022-03-01 RX ORDER — SODIUM CHLORIDE 0.9 % (FLUSH) 0.9 %
5-40 SYRINGE (ML) INJECTION PRN
Status: DISCONTINUED | OUTPATIENT
Start: 2022-03-01 | End: 2022-03-01 | Stop reason: HOSPADM

## 2022-03-01 RX ORDER — LIDOCAINE HYDROCHLORIDE 10 MG/ML
INJECTION, SOLUTION EPIDURAL; INFILTRATION; INTRACAUDAL; PERINEURAL PRN
Status: DISCONTINUED | OUTPATIENT
Start: 2022-03-01 | End: 2022-03-01 | Stop reason: SDUPTHER

## 2022-03-01 RX ORDER — METOCLOPRAMIDE HYDROCHLORIDE 5 MG/ML
10 INJECTION INTRAMUSCULAR; INTRAVENOUS
Status: DISCONTINUED | OUTPATIENT
Start: 2022-03-01 | End: 2022-03-01 | Stop reason: HOSPADM

## 2022-03-01 RX ORDER — SCOLOPAMINE TRANSDERMAL SYSTEM 1 MG/1
PATCH, EXTENDED RELEASE TRANSDERMAL PRN
Status: DISCONTINUED | OUTPATIENT
Start: 2022-03-01 | End: 2022-03-01 | Stop reason: SDUPTHER

## 2022-03-01 RX ORDER — GINSENG 100 MG
CAPSULE ORAL PRN
Status: DISCONTINUED | OUTPATIENT
Start: 2022-03-01 | End: 2022-03-01 | Stop reason: ALTCHOICE

## 2022-03-01 RX ORDER — GLYCOPYRROLATE 1 MG/5 ML
SYRINGE (ML) INTRAVENOUS PRN
Status: DISCONTINUED | OUTPATIENT
Start: 2022-03-01 | End: 2022-03-01 | Stop reason: SDUPTHER

## 2022-03-01 RX ORDER — PROPOFOL 10 MG/ML
INJECTION, EMULSION INTRAVENOUS PRN
Status: DISCONTINUED | OUTPATIENT
Start: 2022-03-01 | End: 2022-03-01 | Stop reason: SDUPTHER

## 2022-03-01 RX ADMIN — PROPOFOL 200 MG: 10 INJECTION, EMULSION INTRAVENOUS at 11:03

## 2022-03-01 RX ADMIN — Medication 60 MG: at 11:11

## 2022-03-01 RX ADMIN — ACETAMINOPHEN 650 MG: 325 TABLET ORAL at 13:20

## 2022-03-01 RX ADMIN — PROPOFOL 50 MG: 10 INJECTION, EMULSION INTRAVENOUS at 11:07

## 2022-03-01 RX ADMIN — MIDAZOLAM HYDROCHLORIDE 2 MG: 1 INJECTION, SOLUTION INTRAMUSCULAR; INTRAVENOUS at 10:53

## 2022-03-01 RX ADMIN — FENTANYL CITRATE 50 MCG: 50 INJECTION, SOLUTION INTRAMUSCULAR; INTRAVENOUS at 11:07

## 2022-03-01 RX ADMIN — CEFAZOLIN 2000 MG: 10 INJECTION, POWDER, FOR SOLUTION INTRAVENOUS at 11:00

## 2022-03-01 RX ADMIN — PROPOFOL 200 MCG/KG/MIN: 10 INJECTION, EMULSION INTRAVENOUS at 11:03

## 2022-03-01 RX ADMIN — FENTANYL CITRATE 50 MCG: 50 INJECTION, SOLUTION INTRAMUSCULAR; INTRAVENOUS at 10:59

## 2022-03-01 RX ADMIN — DEXAMETHASONE SODIUM PHOSPHATE 10 MG: 10 INJECTION INTRAMUSCULAR; INTRAVENOUS at 11:17

## 2022-03-01 RX ADMIN — SODIUM CHLORIDE, POTASSIUM CHLORIDE, SODIUM LACTATE AND CALCIUM CHLORIDE: 600; 310; 30; 20 INJECTION, SOLUTION INTRAVENOUS at 10:53

## 2022-03-01 RX ADMIN — LIDOCAINE HYDROCHLORIDE 40 MG: 10 INJECTION, SOLUTION EPIDURAL; INFILTRATION; INTRACAUDAL; PERINEURAL at 11:01

## 2022-03-01 RX ADMIN — SCOPALAMINE 1 PATCH: 1 PATCH, EXTENDED RELEASE TRANSDERMAL at 10:12

## 2022-03-01 RX ADMIN — DEXAMETHASONE SODIUM PHOSPHATE 10 MG: 10 INJECTION INTRAMUSCULAR; INTRAVENOUS at 11:13

## 2022-03-01 RX ADMIN — ONDANSETRON 4 MG: 2 INJECTION INTRAMUSCULAR; INTRAVENOUS at 11:13

## 2022-03-01 RX ADMIN — Medication 0.2 MG: at 11:03

## 2022-03-01 ASSESSMENT — PULMONARY FUNCTION TESTS
PIF_VALUE: 20
PIF_VALUE: 2
PIF_VALUE: 17
PIF_VALUE: 20
PIF_VALUE: 29
PIF_VALUE: 30
PIF_VALUE: 1
PIF_VALUE: 3
PIF_VALUE: 20
PIF_VALUE: 1
PIF_VALUE: 17
PIF_VALUE: 26
PIF_VALUE: 4
PIF_VALUE: 9
PIF_VALUE: 28
PIF_VALUE: 4
PIF_VALUE: 17
PIF_VALUE: 17
PIF_VALUE: 20
PIF_VALUE: 15
PIF_VALUE: 0
PIF_VALUE: 1
PIF_VALUE: 20
PIF_VALUE: 2
PIF_VALUE: 0
PIF_VALUE: 3
PIF_VALUE: 17
PIF_VALUE: 20
PIF_VALUE: 3
PIF_VALUE: 1
PIF_VALUE: 10
PIF_VALUE: 20
PIF_VALUE: 3
PIF_VALUE: 1
PIF_VALUE: 31
PIF_VALUE: 2
PIF_VALUE: 17
PIF_VALUE: 0
PIF_VALUE: 2
PIF_VALUE: 3
PIF_VALUE: 17
PIF_VALUE: 17
PIF_VALUE: 1
PIF_VALUE: 17
PIF_VALUE: 20
PIF_VALUE: 2
PIF_VALUE: 0
PIF_VALUE: 17
PIF_VALUE: 20
PIF_VALUE: 17
PIF_VALUE: 1
PIF_VALUE: 4
PIF_VALUE: 12

## 2022-03-01 ASSESSMENT — PAIN - FUNCTIONAL ASSESSMENT: PAIN_FUNCTIONAL_ASSESSMENT: 0-10

## 2022-03-01 ASSESSMENT — PAIN SCALES - GENERAL
PAINLEVEL_OUTOF10: 4
PAINLEVEL_OUTOF10: 5

## 2022-03-01 NOTE — BRIEF OP NOTE
Brief Postoperative Note      Patient: Mali Araiza  YOB: 1970  MRN: [id number]    Date of Procedure: 3/1/2022    Pre-Op Diagnosis: RIGHT NIGEL VOCAL CORD PARALYSIS, RIGHT ARM/AXILLA MASS    Post-Op Diagnosis: Same       Procedure(s):  RIGHT NIGEL VOCAL CORD INJECTION. EXCISION OF RIGHT AXILLARY MASS    Surgeon(s): Alexis Vazquez MD    Assistant:  * No surgical staff found *    Anesthesia: General    Estimated Blood Loss (mL): Minimal    Complications: None    Specimens:   ID Type Source Tests Collected by Time Destination   B : RIght axillary mass Tissue Arm SURGICAL PATHOLOGY Alexis Vazquez MD 3/1/2022 1129        Implants:  Implant Name Type Inv.  Item Serial No.  Lot No. LRB No. Used Action   IMPLANT LARYN 24GA 1ML TRNSOR CA STEPHEN FILL PERC INJ NONCORING - [id number]  IMPLANT LARYN 24GA 1ML TRNSOR CA STEPHEN FILL PERC INJ NONCORING  [facility]- [id number] Implanted         Drains: * No LDAs found *    Findings:     Electronically signed by Alexis Vazquez MD on 3/1/2022 at 12:10 PM

## 2022-03-01 NOTE — ANESTHESIA PRE PROCEDURE
Department of Anesthesiology  Preprocedure Note       Name:  Monse Cerda   Age:  45 y.o.  :  1970                                          MRN:  [id number]         Date:  3/1/2022      Surgeon: Camilla Mahoney): Simón Gallardo MD    Procedure: Procedure(s):  NIGEL VOCAL CORD INJECTION. POSSIBLE EXCISION AXILLARY LYMPH NODE    Medications prior to admission:   Prior to Admission medications    Medication Sig Start Date End Date Taking? Authorizing Provider   mupirocin (BACTROBAN) 2 % ointment  22  Yes Historical Provider, MD   levothyroxine (SYNTHROID) 137 MCG tablet Take 1 tablet by mouth Daily 21  Yes Natasha Yeager MD   acetaminophen (TYLENOL) 325 MG tablet Take 325 mg by mouth every 6 hours as needed. OTC      Yes Historical Provider, MD   Multiple Vitamins-Minerals (THERAPEUTIC MULTIVITAMIN-MINERALS) tablet Take 1 tablet by mouth daily     Historical Provider, MD   ibuprofen (ADVIL;MOTRIN) 200 MG tablet Take 200 mg by mouth every 6 hours as needed for Pain    Historical Provider, MD   diphenhydrAMINE (BENADRYL) 25 MG capsule Take 2 capsules by mouth every 4 hours as needed for Itching (swelling) 9/10/21   Geovani Zhang MD       Current medications:    Current Facility-Administered Medications   Medication Dose Route Frequency Provider Last Rate Last Admin    lactated ringers infusion   IntraVENous Continuous Sathish Ramos DO           Allergies:     Allergies   Allergen Reactions    Iv Dye [Iodides] Anaphylaxis    Iodine Other (See Comments)    Codeine Nausea And Vomiting       Problem List:    Patient Active Problem List   Diagnosis Code    Benign non-nodular prostatic hyperplasia without lower urinary tract symptoms N40.0    Acquired hypothyroidism E03.9    Chronic bilateral low back pain M54.50, G89.29    Hypertriglyceridemia E78.1    History of 2019 novel coronavirus disease (COVID-19) Z86.16    Mass of right side of neck R22.1    Papillary carcinoma of thyroid ([facility] [address] C73 Past Medical History:        Diagnosis Date    Acquired hypothyroidism 2017    meds > 10 yrs    Anxiety     Benign non-nodular prostatic hyperplasia without lower urinary tract symptoms 3/3/2016    Chronic bilateral low back pain 3/29/2019    History of 2019 novel coronavirus disease (COVID-19) 9/10/2021    Hypertriglyceridemia 2020    high triglycerides -- diet control    Leukopenia 2012    Papillary carcinoma of thyroid ([address] 2022    PONV (postoperative nausea and vomiting)     severe n/v with choli       Past Surgical History:        Procedure Laterality Date    CHOLECYSTECTOMY, LAPAROSCOPIC      COLONOSCOPY  2018    int hemorr, 10y repeat (DR SCHERER)    CT NEEDLE BIOPSY LUNG PERCUTANEOUS Right 2022    performed by Dr. Morales Dec  2022    CT NEEDLE BIOPSY LUNG PERCUTANEOUS 2022 [facility] CT SCAN    NECK SURGERY Right 2021    Deep jugular node dissection with removal of right jovanny mass conglomerate (DR CERVANTES)    NECK SURGERY Right 2021    REMOVAL OF RIGHT NECK MASS performed by Nikolay Hager MD at [address] [place] [address]      gastritis    VASECTOMY         Social History:    Social History     Tobacco Use    Smoking status: Former Smoker     Packs/day: 0.30     Years: 5.00     Pack years: 1.50     Types: Cigarettes     Start date:      Quit date: 2003     Years since quittin.1    Smokeless tobacco: Never Used   Substance Use Topics    Alcohol use: Yes     Comment: social                                Counseling given: Not Answered      Vital Signs (Current):   Vitals:    22 0857   BP: (!) 141/87   Pulse: 84   Resp: 18   Temp: 97.2 °F (36.2 °C)   TempSrc: Temporal   SpO2: 100%   Weight: 195 lb (88.5 kg)   Height: 5' 11\" (1.803 m)                                              BP Readings from Last 3 Encounters:   22 (!) 141/87   22 138/88 02/21/22 (!) 137/94       NPO Status: Time of last liquid consumption: 2200                        Time of last solid consumption: 2200                        Date of last liquid consumption: 02/28/22                        Date of last solid food consumption: 02/28/22    BMI:   Wt Readings from Last 3 Encounters:   03/01/22 195 lb (88.5 kg)   02/24/22 195 lb (88.5 kg)   02/21/22 195 lb (88.5 kg)     Body mass index is 27.2 kg/m². CBC:   Lab Results   Component Value Date    WBC 5.7 02/24/2022    RBC 4.08 02/24/2022    RBC 4.48 05/01/2012    HGB 13.0 02/24/2022    HCT 37.2 02/24/2022    MCV 91.1 02/24/2022    RDW 15.0 02/24/2022     02/24/2022       CMP:   Lab Results   Component Value Date     02/24/2022    K 4.1 02/24/2022     02/24/2022    CO2 25 02/24/2022    BUN 14 02/24/2022    CREATININE 0.99 02/24/2022    GFRAA >60.0 02/24/2022    LABGLOM >60.0 02/24/2022    GLUCOSE 122 02/24/2022    GLUCOSE 113 01/02/2022    PROT 7.4 01/02/2022    CALCIUM 8.9 02/24/2022    BILITOT 0.9 01/02/2022    ALKPHOS 53 01/02/2022    AST 21 01/02/2022    ALT 18 01/02/2022       POC Tests: No results for input(s): POCGLU, POCNA, POCK, POCCL, POCBUN, POCHEMO, POCHCT in the last 72 hours.     Coags:   Lab Results   Component Value Date    PROTIME 13.0 01/24/2022    INR 1.0 01/24/2022       HCG (If Applicable): No results found for: PREGTESTUR, PREGSERUM, HCG, HCGQUANT     ABGs: No results found for: PHART, PO2ART, MLW6KPL, NXY9YOH, BEART, U9ODHYVY     Type & Screen (If Applicable):  No results found for: LABABO, LABRH    Drug/Infectious Status (If Applicable):  No results found for: HIV, HEPCAB    COVID-19 Screening (If Applicable):   Lab Results   Component Value Date    COVID19 Not Detected 12/27/2021           Anesthesia Evaluation  Patient summary reviewed and Nursing notes reviewed history of anesthetic complications:   Airway: Mallampati: II  TM distance: >3 FB   Neck ROM: full  Mouth opening: > = 3 FB

## 2022-03-02 ENCOUNTER — CARE COORDINATION (OUTPATIENT)
Dept: OTHER | Facility: CLINIC | Age: 52
End: 2022-03-02

## 2022-03-02 ENCOUNTER — HOSPITAL ENCOUNTER (OUTPATIENT)
Dept: CT IMAGING | Age: 52
Discharge: HOME | End: 2022-03-04
Payer: COMMERCIAL

## 2022-03-02 DIAGNOSIS — C73 THYROID CANCER (HCC): ICD-10-CM

## 2022-03-02 DIAGNOSIS — C73 PAPILLARY THYROID CARCINOMA (HCC): ICD-10-CM

## 2022-03-02 PROCEDURE — 71250 CT THORAX DX C-: CPT

## 2022-03-02 PROCEDURE — 70490 CT SOFT TISSUE NECK W/O DYE: CPT

## 2022-03-02 PROCEDURE — 74150 CT ABDOMEN W/O CONTRAST: CPT

## 2022-03-02 NOTE — OP NOTE
[address]                      [address] [facility], [address] [facility]                                OPERATIVE REPORT    PATIENT NAME: Cheryl Diamond                     :        1970  MED REC NO:   [id number]                            ROOM:  ACCOUNT NO:   [de-identified]                           ADMIT DATE: 2022  PROVIDER:     Keila Diamond MD    DATE OF PROCEDURE:  2022    DIAGNOSES:  1. Right true vocal cord paralysis following total thyroidectomy with  neck dissection from malignancy. 2.  Right subcutaneous medial upper arm mass. OPERATION:  1. Direct laryngoscopy with injection of Radiesse temporary gel total  of 0.7 mL from medialization of the cord. 2.  Excision of right upper medial arm subcutaneous mass. SURGEON:  Keila Diamond MD    ANESTHESIA:  General.    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 He is now taken  to surgery for vocal cord medialization and additionally has this  subcutaneous mass in the upper medial arm into the axilla which needs to  be removed for definitive diagnosis with concern for possible metastatic  subcutaneous nodule. OPERATIVE PROCEDURE:  The patient was taken to the operating room and  administered general anesthesia, but not intubated. The patient had the  Dedo laryngoscope brought into position and clear identification of the  right true vocal cord noted. _____ was identified at the level of the  vocalis process of the arytenoid and a total of roughly 0.5 mL of the  Radiesse gel was infiltrated at that level and anteriorly I put another  0.2 mL of the gel accordingly. This allowed for significant improvement  at the level of the cord and as such the laryngoscope was removed and  the patient safely intubated by anesthesia while the arm was addressed.    The arm was gently retracted posterolaterally and clear identification  of the mass was made and sterile prep and drape was performed in usual  manner. An elliptical incision was marked out incorporating the  irregularity and the skin infiltrated with lidocaine 1% with epinephrine  1:100,000. The skin incision was carried accordingly with the mass  dissected appropriately with no evidence of any residual mass in the  subcutaneous tissue once this was removed. This was sent in several  small pieces, but again no evidence of any persistent mass or  irregularity in this region upon completion of the procedure, which does  show this to be possibly either an infectious or possible inflammatory  irregularity with necrosis. The wound was irrigated and any bleeding  controlled with judicious use of cautery. The resultant wound was  closed with interrupted nylon suture and the patient had antibiotic  ointment applied. He was released and taken to recovery in stable  condition. Estimated blood loss minimal.  No complications.         Bel Edwards MD    D: 03/01/2022 16:13:43       T: 03/02/2022 1:10:37     [id number]  Job#: [id number]     Doc#: [id number]    CC:

## 2022-03-02 NOTE — CARE COORDINATION
Patient's chart has been reviewed for Care Coordination needs. Patient will not be enrolled in Care Coordination due to : Patient has no identified needs. Per chart review patient follow up appointments with providers scheduled/ completed or appropriate utilization of services.

## 2022-03-11 ENCOUNTER — HOSPITAL ENCOUNTER (OUTPATIENT)
Dept: LAB | Age: 52
Discharge: HOME | End: 2022-03-11
Payer: COMMERCIAL

## 2022-03-11 DIAGNOSIS — C73 PAPILLARY THYROID CARCINOMA (HCC): ICD-10-CM

## 2022-03-11 LAB
T4 FREE: 1.32 NG/DL (ref 0.84–1.68)
TSH REFLEX: 33.66 UIU/ML (ref 0.44–3.86)

## 2022-03-11 PROCEDURE — 84443 ASSAY THYROID STIM HORMONE: CPT

## 2022-03-11 PROCEDURE — 84432 ASSAY OF THYROGLOBULIN: CPT

## 2022-03-11 PROCEDURE — 84270 ASSAY OF SEX HORMONE GLOBUL: CPT

## 2022-03-11 PROCEDURE — 84403 ASSAY OF TOTAL TESTOSTERONE: CPT

## 2022-03-11 PROCEDURE — 86800 THYROGLOBULIN ANTIBODY: CPT

## 2022-03-11 PROCEDURE — 84439 ASSAY OF FREE THYROXINE: CPT

## 2022-03-11 PROCEDURE — 36415 COLL VENOUS BLD VENIPUNCTURE: CPT

## 2022-03-11 PROCEDURE — 82533 TOTAL CORTISOL: CPT

## 2022-03-12 LAB
CORTISOL COLLECTION INFO: NORMAL
CORTISOL: 13.6 UG/DL (ref 2.7–18.4)
SEX HORMONE BINDING GLOBULIN: 39 NMOL/L (ref 11–80)
TESTOSTERONE FREE-NONMALE: 82.6 PG/ML (ref 47–244)
TESTOSTERONE TOTAL: 444 NG/DL (ref 220–1000)

## 2022-03-15 LAB — THYROGLOBULIN ANTIBODY: >4794 IU/ML (ref 0–40)

## 2022-03-20 LAB — THYROGLOBULIN BY LC-MS/MS, SERUM/PLASMA: <0.5 NG/ML (ref 1.3–31.8)

## 2022-03-29 ENCOUNTER — HOSPITAL ENCOUNTER (OUTPATIENT)
Dept: MRI IMAGING | Age: 52
Discharge: HOME | End: 2022-03-31
Payer: COMMERCIAL

## 2022-03-29 ENCOUNTER — OFFICE VISIT (OUTPATIENT)
Dept: ENDOCRINOLOGY | Age: 52
End: 2022-03-29
Payer: COMMERCIAL

## 2022-03-29 VITALS
OXYGEN SATURATION: 97 % | DIASTOLIC BLOOD PRESSURE: 90 MMHG | WEIGHT: 185 LBS | SYSTOLIC BLOOD PRESSURE: 142 MMHG | HEART RATE: 80 BPM | BODY MASS INDEX: 25.9 KG/M2 | HEIGHT: 71 IN

## 2022-03-29 DIAGNOSIS — E89.0 POSTOPERATIVE HYPOTHYROIDISM: ICD-10-CM

## 2022-03-29 DIAGNOSIS — C73 MALIGNANT NEOPLASM OF THYROID GLAND (HCC): ICD-10-CM

## 2022-03-29 DIAGNOSIS — M54.50 LOW BACK PAIN, UNSPECIFIED BACK PAIN LATERALITY, UNSPECIFIED CHRONICITY, UNSPECIFIED WHETHER SCIATICA PRESENT: ICD-10-CM

## 2022-03-29 DIAGNOSIS — M54.6 PAIN IN THORACIC SPINE: ICD-10-CM

## 2022-03-29 DIAGNOSIS — E03.9 ACQUIRED HYPOTHYROIDISM: Chronic | ICD-10-CM

## 2022-03-29 DIAGNOSIS — C73 PAPILLARY THYROID CARCINOMA (HCC): Primary | ICD-10-CM

## 2022-03-29 PROCEDURE — A9577 INJ MULTIHANCE: HCPCS | Performed by: INTERNAL MEDICINE

## 2022-03-29 PROCEDURE — 72158 MRI LUMBAR SPINE W/O & W/DYE: CPT

## 2022-03-29 PROCEDURE — 99213 OFFICE O/P EST LOW 20 MIN: CPT | Performed by: INTERNAL MEDICINE

## 2022-03-29 PROCEDURE — 72157 MRI CHEST SPINE W/O & W/DYE: CPT

## 2022-03-29 PROCEDURE — 6360000004 HC RX CONTRAST MEDICATION: Performed by: INTERNAL MEDICINE

## 2022-03-29 RX ORDER — LEVOTHYROXINE SODIUM 0.15 MG/1
150 TABLET ORAL DAILY
Qty: 90 TABLET | Refills: 3 | Status: SHIPPED | OUTPATIENT
Start: 2022-03-29 | End: 2022-07-19

## 2022-03-29 RX ORDER — TRAMADOL HYDROCHLORIDE 50 MG/1
50 TABLET ORAL PRN
COMMUNITY
Start: 2022-03-18

## 2022-03-29 RX ORDER — SODIUM CHLORIDE 0.9 % (FLUSH) 0.9 %
10 SYRINGE (ML) INJECTION PRN
Status: DISCONTINUED | OUTPATIENT
Start: 2022-03-29 | End: 2022-04-01 | Stop reason: HOSPADM

## 2022-03-29 RX ADMIN — GADOBENATE DIMEGLUMINE 20 ML: 529 INJECTION, SOLUTION INTRAVENOUS at 19:11

## 2022-03-29 NOTE — PROGRESS NOTES
3/29/2022    Assessment:       Diagnosis Orders   1. Papillary thyroid carcinoma (HCC)  T4, Free    TSH    Thyroglobulin    Anti-Thyroglobulin Antibody   2. Postoperative hypothyroidism     3.  Acquired hypothyroidism  levothyroxine (SYNTHROID) 150 MCG tablet         PLAN:     Orders Placed This Encounter   Procedures    T4, Free     Standing Status:   Future     Standing Expiration Date:   3/29/2023    TSH     Standing Status:   Future     Standing Expiration Date:   3/29/2023    Thyroglobulin     Standing Status:   Future     Standing Expiration Date:   3/29/2023    Anti-Thyroglobulin Antibody     Standing Status:   Future     Standing Expiration Date:   3/29/2023     Increased dose of Synthroid to 150  Repeat labs  Patient continue to follow-up with [facility] for management of his thyroid cancer  Orders Placed This Encounter   Medications    levothyroxine (SYNTHROID) 150 MCG tablet     Sig: Take 1 tablet by mouth Daily     Dispense:  90 tablet     Refill:  3       Subjective:     Chief Complaint   Patient presents with    Hypothyroidism     Vitals:    03/29/22 1554 03/29/22 1557   BP: (!) 142/90 (!) 142/90   Pulse: 80    SpO2: 97%    Weight: 185 lb (83.9 kg)    Height: 5' 11\" (1.803 m)      Wt Readings from Last 3 Encounters:   03/29/22 185 lb (83.9 kg)   03/01/22 195 lb (88.5 kg)   02/24/22 195 lb (88.5 kg)     BP Readings from Last 3 Encounters:   03/29/22 (!) 142/90   03/01/22 131/74   03/01/22 (!) 90/52     Follow-up on hypothyroidism secondary to thyroidectomy for papillary thyroid carcinoma labs were done recently cortisol testosterone levels have been normal TSH was still elevated thyroglobulin level was less than 0.5 thyroglobulin antibodies have been elevated patient has been complaining of left-sided rib and back pain as well as have MRI of thoracolumbar spine  Also getting tumor markers on his thyroid tissue to see if he would need any additional treatment before a possible radioactive iodine ablation or kinase inhibitor treatment    Other  This is a recurrent (Hypothyroidism) problem. The current episode started more than 1 month ago. The problem occurs intermittently. Associated symptoms include fatigue and swollen glands. Pertinent negatives include no neck pain. Treatments tried: Thyroid replacement. The treatment provided moderate relief. EXAMINATION:  CT SCAN CHEST       CLINICAL HISTORY:  Papillary thyroid cancer, follow-up       COMPARISON:  December 15, 2021 1121 hours       TECHNIQUE:  Multiple serial axial images of the chest with sagittal coronal reconstructions as well as maximum imaging ejection coronal reconstruction performed without intravenous or oral demonstration of contrast.       FINDINGS:     There are multiple nodules too numerous to count scattered throughout both the left and right lobes. The smaller nodules in the range of 2 to 3 mm are best appreciated on the MIPS reconstruction in the coronal imaging where there is again noted is a    multitude of small nodules particularly in the lower two thirds of the lung parenchyma. Sample nodule on the right apex series 3 image 28 now measures 1.5 cm. It has shown some minimal 1 mm to 2 mm increase in size as compared to the prior study. Sample nodule at the posterior medial aspect of the right lower lobe series 3 image 53 now measures 2.0 cm. Similarly it has shown 1 to 2 mm increase in size as compared to prior study. Sample nodule within the medial aspect of left lower lobe series 3 image 53 now measures 1.1 cm and similarly it has shown small 1 mm to 2 mm increase in size as compared to the prior study. No pleural effusions.  No pneumothoraces.       No significant periaortic, pretracheal, parahilar or subcarinal adenopathy.       Postsurgical changes surgical staples are seen within the visualized portion of the lower neck and right paratracheal suprahilar region.       There is multilevel degenerative change with bridging osteophytes of the thoracic spine.       Impression   1. BEST APPRECIATED ON THE MAXIMAL IMAGE PROJECTION RECONSTRUCTION IN THE CORONAL PLANE IS THE DIFFUSE APPEARANCE OF MULTIPLE NODULES THROUGHOUT THE LUNG PARENCHYMA CONSISTENT WITH METASTATIC DISEASE. OF THE SAMPLE NODULES DESCRIBED ABOVE THERE HAS BEEN    SMALL INTERVAL PROGRESSION IN SIZE AS COMPARED TO THE PRIOR STUDY.           All CT scans at this facility use dose modulation, iterative reconstruction, and/or weight based dosing when appropriate to reduce radiation dose to as low as reasonably achievable. Results for Linda Aguilar (MRN [id number]) as of 3/30/2022 07:26   Ref.  Range 3/11/2022 12:39 3/11/2022 12:40   CORTISOL Latest Ref Range: 2.7 - 18.4 ug/dL 13.6    Cortisol Collection Info Unknown UNK    Sex Hormone Binding Latest Ref Range: 11 - 80 nmol/L 39    Testosterone Latest Ref Range: 220 - 1,000 ng/dL 444    Testosterone, Free Latest Ref Range: 47 - 244 pg/mL 82.6    TSH Latest Ref Range: 0.440 - 3.860 uIU/mL 33.660 (H)    T4 Free Latest Ref Range: 0.84 - 1.68 ng/dL 1.32    Thyroglobulin Antibody Latest Ref Range: 0.0 - 40.0 IU/mL  >4,794.0 (H)   Thyroglobulin by LC-MS/MS, Serum/Plasma Latest Ref Range: 1.3 - 31.8 ng/mL <0.5 (L)        Past Medical History:   Diagnosis Date    Acquired hypothyroidism 12/20/2017    meds > 10 yrs    Anxiety 2008    Benign non-nodular prostatic hyperplasia without lower urinary tract symptoms 3/3/2016    Chronic bilateral low back pain 3/29/2019    History of 2019 novel coronavirus disease (COVID-19) 9/10/2021    Hypertriglyceridemia 8/25/2020    high triglycerides -- diet control    Leukopenia 7/24/2012    Papillary carcinoma of thyroid ([address] 2/24/2022    PONV (postoperative nausea and vomiting)     severe n/v with choli     Past Surgical History:   Procedure Laterality Date    CHOLECYSTECTOMY, LAPAROSCOPIC  2010    COLONOSCOPY  06/20/2018    int hemorr, 10y repeat (DR SCHERER)    CT NEEDLE BIOPSY LUNG PERCUTANEOUS Right 2022    performed by Dr. Beto Olivo  2022    CT NEEDLE BIOPSY LUNG PERCUTANEOUS 2022 [facility] CT SCAN    LYMPH NODE BIOPSY Right 3/1/2022    EXCISION OF RIGHT AXILLARY MASS performed by Simón Gallardo MD at [address] Right 2021    Deep jugular node dissection with removal of right jovanny mass conglomerate (DR CERVANTES)    NECK SURGERY Right 2021    REMOVAL OF RIGHT NECK MASS performed by Simón Gallardo MD at [address]  2009    gastritis    VASECTOMY  2007    VOCAL CORD SURGERY Right 3/1/2022    RIGHT NIGEL VOCAL CORD INJECTION.  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 [employer]   Tobacco Use    Smoking status: Former Smoker     Packs/day: 0.30     Years: 5.00     Pack years: 1.50     Types: Cigarettes     Start date:      Quit date: 2003     Years since quittin.2    Smokeless tobacco: Never Used   Vaping Use    Vaping Use: Never used   Substance and Sexual Activity    Alcohol use: Yes     Comment: social    Drug use: No    Sexual activity: Yes     Partners: Female     Comment: monogamous   Other Topics Concern    Not on file   Social History Narrative    Lives with wife and children        1 dog        Hobbies: reading, outdoor work     Social Determinants of Health     Financial Resource Strain: Low Risk     Difficulty of Paying Living Expenses: Not hard at all   Food Insecurity: No Food Insecurity    Worried About [address] in the Last Year: Never true    [address] in the Last Year: Never true   Transportation Needs:     Lack of Transportation (Medical): Not on file    Lack of Transportation (Non-Medical): Not on file   Physical Activity:     Days of Exercise per Week: Not on file    Minutes of Exercise per Session: Not on file   Stress:     Feeling of Stress : Not on file   Social Connections:     Frequency of Communication with Friends and Family: Not on file    Frequency of Social Gatherings with Friends and Family: Not on file    Attends Spiritism Services: Not on file    Active Member of Clubs or Organizations: Not on file    Attends Club or Organization Meetings: Not on file    Marital Status: Not on file   Intimate Partner Violence:     Fear of Current or Ex-Partner: Not on file    Emotionally Abused: Not on file    Physically Abused: Not on file    Sexually Abused: Not on file   Housing Stability:     Unable to Pay for Housing in the Last Year: Not on file    Number of Places Lived in the Last Year: Not on file    Unstable Housing in the Last Year: Not on file     Family History   Problem Relation Age of Onset    Diabetes Mother     Anemia Mother     High Blood Pressure Mother     Kidney Disease Mother     Arthritis Mother     Asthma Mother     Cervical Cancer Mother 43    Heart Failure Mother     High Cholesterol Mother     High Blood Pressure Father     Diabetes Father     Cirrhosis Father         Hep C    Mental Retardation Maternal Uncle     Diabetes type 2  Brother     No Known Problems Maternal Grandmother     No Known Problems Maternal Grandfather     No Known Problems Paternal Grandmother     No Known Problems Paternal Grandfather     No Known Problems Brother     No Known Problems Daughter     No Known Problems Son      Allergies   Allergen Reactions    Iv Dye [Iodides] Anaphylaxis    Iodine Other (See Comments)    Codeine Nausea And Vomiting       Current Outpatient Medications:     traMADol (ULTRAM) 50 MG tablet, Take 50 mg by mouth as needed. , Disp: , Rfl:     mupirocin (BACTROBAN) 2 % ointment, , Disp: , Rfl:     Multiple Vitamins-Minerals (THERAPEUTIC MULTIVITAMIN-MINERALS) tablet, Take 1 tablet by mouth daily , Disp: , Rfl:     ibuprofen (ADVIL;MOTRIN) 200 MG tablet, Take 200 mg by mouth every 6 hours as needed for Pain, Disp: , Rfl:     levothyroxine (SYNTHROID) 137 MCG tablet, Take 1 tablet by mouth Daily, Disp: 90 tablet, Rfl: 1    diphenhydrAMINE (BENADRYL) 25 MG capsule, Take 2 capsules by mouth every 4 hours as needed for Itching (swelling), Disp: 20 capsule, Rfl: 0    acetaminophen (TYLENOL) 325 MG tablet, Take 325 mg by mouth every 6 hours as needed.  OTC  , Disp: , Rfl:   Lab Results   Component Value Date     02/24/2022    K 4.1 02/24/2022     02/24/2022    CO2 25 02/24/2022    BUN 14 02/24/2022    CREATININE 0.99 02/24/2022    GLUCOSE 122 (H) 02/24/2022    CALCIUM 8.9 02/24/2022    PROT 7.4 01/02/2022    LABALBU 4.7 01/02/2022    BILITOT 0.9 01/02/2022    ALKPHOS 53 01/02/2022    AST 21 01/02/2022    ALT 18 01/02/2022    LABGLOM >60.0 02/24/2022    GFRAA >60.0 02/24/2022    GLOB 2.4 09/07/2021     Lab Results   Component Value Date    WBC 5.7 02/24/2022    HGB 13.0 (L) 02/24/2022    HCT 37.2 (L) 02/24/2022    MCV 91.1 02/24/2022     02/24/2022     No results found for: LABA1C  Lab Results   Component Value Date    HDL 43 09/07/2021    HDL 40 06/25/2021    HDL 34 (L) 07/31/2020    LDLCALC 87 09/07/2021    LDLCALC 77 06/25/2021    LDLCALC 83 07/31/2020    CHOL 156 09/07/2021    CHOL 139 06/25/2021    CHOL 147 07/31/2020    TRIG 131 09/07/2021    TRIG 109 06/25/2021    TRIG 152 (H) 07/31/2020     Lab Results   Component Value Date    TESTM 598 12/20/2017     Lab Results   Component Value Date    TSH 4.760 (H) 09/07/2021    TSH 3.020 07/17/2020    TSH 1.450 04/01/2019    TSHREFLEX 33.660 (H) 03/11/2022    TSHREFLEX 83.840 (H) 01/11/2022    T4FREE 1.32 03/11/2022    T4FREE 0.22 (L) 01/11/2022    T4FREE 1.28 09/07/2021     No results found for: TPOABS    Review of Systems   Constitutional: Positive for fatigue. Endocrine: Positive for heat intolerance. Musculoskeletal: Negative for neck pain. All other systems reviewed and are negative. Objective:   Physical Exam  Vitals reviewed. Constitutional:       Appearance: Normal appearance. HENT:      Head: Normocephalic and atraumatic. Right Ear: External ear normal.      Left Ear: External ear normal.      Nose: Nose normal.   Eyes:      General: No scleral icterus. Right eye: No discharge. Left eye: No discharge. Extraocular Movements: Extraocular movements intact. Conjunctiva/sclera: Conjunctivae normal.   Neck:     Cardiovascular:      Rate and Rhythm: Normal rate. Pulmonary:      Effort: Pulmonary effort is normal.   Abdominal:      Palpations: Abdomen is soft. Musculoskeletal:         General: Normal range of motion. Cervical back: Normal range of motion and neck supple. Neurological:      General: No focal deficit present. Mental Status: He is alert and oriented to person, place, and time.    Psychiatric:         Mood and Affect: Mood normal.         Behavior: Behavior normal.

## 2022-03-30 ASSESSMENT — ENCOUNTER SYMPTOMS: SWOLLEN GLANDS: 1

## 2022-05-05 ENCOUNTER — HOSPITAL ENCOUNTER (OUTPATIENT)
Dept: LAB | Age: 52
Discharge: HOME | End: 2022-05-05
Payer: COMMERCIAL

## 2022-05-05 LAB
ALBUMIN SERPL-MCNC: 4.7 G/DL (ref 3.5–4.6)
ALP BLD-CCNC: 69 U/L (ref 35–104)
ALT SERPL-CCNC: 16 U/L (ref 0–41)
ANION GAP SERPL CALCULATED.3IONS-SCNC: 15 MEQ/L (ref 9–15)
AST SERPL-CCNC: 24 U/L (ref 0–40)
BASOPHILS ABSOLUTE: 0 K/UL (ref 0–0.2)
BASOPHILS RELATIVE PERCENT: 0.4 %
BILIRUB SERPL-MCNC: 0.7 MG/DL (ref 0.2–0.7)
BUN BLDV-MCNC: 14 MG/DL (ref 6–20)
CALCIUM SERPL-MCNC: 9.2 MG/DL (ref 8.5–9.9)
CHLORIDE BLD-SCNC: 106 MEQ/L (ref 95–107)
CO2: 22 MEQ/L (ref 20–31)
CREAT SERPL-MCNC: 1.01 MG/DL (ref 0.7–1.2)
EOSINOPHILS ABSOLUTE: 0.1 K/UL (ref 0–0.7)
EOSINOPHILS RELATIVE PERCENT: 2.4 %
GFR AFRICAN AMERICAN: >60
GFR NON-AFRICAN AMERICAN: >60
GLOBULIN: 2.8 G/DL (ref 2.3–3.5)
GLUCOSE BLD-MCNC: 99 MG/DL (ref 70–99)
HCT VFR BLD CALC: 39.7 % (ref 42–52)
HEMOGLOBIN: 13.3 G/DL (ref 14–18)
LACTATE DEHYDROGENASE: 212 U/L (ref 135–225)
LYMPHOCYTES ABSOLUTE: 0.8 K/UL (ref 1–4.8)
LYMPHOCYTES RELATIVE PERCENT: 14.7 %
MCH RBC QN AUTO: 31.2 PG (ref 27–31.3)
MCHC RBC AUTO-ENTMCNC: 33.6 % (ref 33–37)
MCV RBC AUTO: 93.1 FL (ref 80–100)
MONOCYTES ABSOLUTE: 0.5 K/UL (ref 0.2–0.8)
MONOCYTES RELATIVE PERCENT: 8.9 %
NEUTROPHILS ABSOLUTE: 3.9 K/UL (ref 1.4–6.5)
NEUTROPHILS RELATIVE PERCENT: 73.6 %
PDW BLD-RTO: 13.3 % (ref 11.5–14.5)
PLATELET # BLD: 237 K/UL (ref 130–400)
POTASSIUM SERPL-SCNC: 4.4 MEQ/L (ref 3.4–4.9)
RBC # BLD: 4.26 M/UL (ref 4.7–6.1)
SODIUM BLD-SCNC: 143 MEQ/L (ref 135–144)
TOTAL PROTEIN: 7.5 G/DL (ref 6.3–8)
WBC # BLD: 5.3 K/UL (ref 4.8–10.8)

## 2022-05-05 PROCEDURE — 84432 ASSAY OF THYROGLOBULIN: CPT

## 2022-05-05 PROCEDURE — 83615 LACTATE (LD) (LDH) ENZYME: CPT

## 2022-05-05 PROCEDURE — 80053 COMPREHEN METABOLIC PANEL: CPT

## 2022-05-05 PROCEDURE — 36415 COLL VENOUS BLD VENIPUNCTURE: CPT

## 2022-05-05 PROCEDURE — 85025 COMPLETE CBC W/AUTO DIFF WBC: CPT

## 2022-05-05 PROCEDURE — 86800 THYROGLOBULIN ANTIBODY: CPT

## 2022-05-06 ENCOUNTER — HOSPITAL ENCOUNTER (OUTPATIENT)
Dept: CT IMAGING | Age: 52
Discharge: HOME | End: 2022-05-08
Payer: COMMERCIAL

## 2022-05-06 DIAGNOSIS — C73 MALIGNANT NEOPLASM OF THYROID GLAND (HCC): ICD-10-CM

## 2022-05-06 PROCEDURE — 71250 CT THORAX DX C-: CPT

## 2022-05-06 PROCEDURE — 70490 CT SOFT TISSUE NECK W/O DYE: CPT

## 2022-05-08 LAB — THYROGLOBULIN ANTIBODY: 3601 IU/ML (ref 0–40)

## 2022-05-11 LAB — THYROGLOBULIN BY LC-MS/MS, SERUM/PLASMA: <0.5 NG/ML (ref 1.3–31.8)

## 2022-05-18 ENCOUNTER — OFFICE VISIT (OUTPATIENT)
Dept: FAMILY MEDICINE CLINIC | Age: 52
End: 2022-05-18
Payer: COMMERCIAL

## 2022-05-18 VITALS
OXYGEN SATURATION: 98 % | BODY MASS INDEX: 26.6 KG/M2 | TEMPERATURE: 96.6 F | HEART RATE: 72 BPM | HEIGHT: 71 IN | DIASTOLIC BLOOD PRESSURE: 86 MMHG | SYSTOLIC BLOOD PRESSURE: 122 MMHG | WEIGHT: 190 LBS

## 2022-05-18 DIAGNOSIS — R19.5 LOOSE STOOLS: ICD-10-CM

## 2022-05-18 DIAGNOSIS — L72.3 SEBACEOUS CYST OF RIGHT AXILLA: Primary | ICD-10-CM

## 2022-05-18 PROBLEM — J38.00 VOCAL CORD PALSY: Status: ACTIVE | Noted: 2022-03-14

## 2022-05-18 PROCEDURE — 99213 OFFICE O/P EST LOW 20 MIN: CPT | Performed by: FAMILY MEDICINE

## 2022-05-18 ASSESSMENT — PATIENT HEALTH QUESTIONNAIRE - PHQ9
SUM OF ALL RESPONSES TO PHQ QUESTIONS 1-9: 0
SUM OF ALL RESPONSES TO PHQ QUESTIONS 1-9: 0
SUM OF ALL RESPONSES TO PHQ9 QUESTIONS 1 & 2: 0
1. LITTLE INTEREST OR PLEASURE IN DOING THINGS: 0
SUM OF ALL RESPONSES TO PHQ QUESTIONS 1-9: 0
SUM OF ALL RESPONSES TO PHQ QUESTIONS 1-9: 0
2. FEELING DOWN, DEPRESSED OR HOPELESS: 0

## 2022-05-18 ASSESSMENT — ENCOUNTER SYMPTOMS
CONSTIPATION: 0
SHORTNESS OF BREATH: 0
ANAL BLEEDING: 0
ABDOMINAL PAIN: 1
RECTAL PAIN: 0
NAUSEA: 1
ABDOMINAL DISTENTION: 0
DIARRHEA: 1
VOMITING: 0
BLOOD IN STOOL: 0
COLOR CHANGE: 0
CHEST TIGHTNESS: 0

## 2022-05-18 NOTE — PROGRESS NOTES
Devi Mccabe (: 1970) is a 45 y.o. male, Established patient, who presents today for:    Chief Complaint   Patient presents with   Francy Paola Mass     Patient is present for sore lump under right arm          ASSESSMENT/PLAN    1. Sebaceous cyst of right axilla  Comments: Will monitor based on presentation and improvement over time. If persisting >2 WKS then patient to call for general surgery referral to further evaluate  2. Loose stools  Assessment & Plan:  Likely related to increasing of levothyroxine dosing in the setting of known thyroid cancer status post thyroidectomy. Conservative measures reviewed including increased fluids, fiber supplementation, high-fiber diet, daily probiotic x4 weeks, and lower FODMAP diet. Return if symptoms worsen or fail to improve. SUBJECTIVE/OBJECTIVE:    HPI     Patient presents for acute visit regarding painful lump in the right axilla. Patient reports 5-7 day history missing superficial painful nodule in the right axilla with no reported overlying erythema, warmth, or noted drainage/bleeding. Patient reports decreasing size over time and resolution of pain as of yesterday. There is no associated fever/chills/sweats, fatigue, or recent upper respiratory illness. Patient has tried no relieving measures at home. They report a similar episode in the past managed by ENT with surgical excision which was found to be a benign necrotic cyst/mass. Patient remains established with oncology at [facility] for papillary thyroid cancer with metastasis to the lungs. Patient also remains established with ENT at [facility] for chronic follow-up regarding subsequent vocal cord palsy status post total thyroidectomy. Patient reports a 2-week history of intermittent loose stools/diarrhea without any noted red blood or mucus. There is associated abdominal cramping and intermittent nausea, but no emesis and no melena.   Patient denies any heartburn/indigestion, or significant change in appetite. There are no reported associated foods or beverages noted, however patient does report episodes are usually noted following meals or in the late afternoon/evening. There seems to be an association with increasing levothyroxine dosing per endocrinology/ENT status post thyroidectomy. There are no reported sick household contacts or any recent travel. Patient denies any similar symptoms in the past.    Current Outpatient Medications on File Prior to Visit   Medication Sig Dispense Refill    traMADol (ULTRAM) 50 MG tablet Take 50 mg by mouth as needed.  levothyroxine (SYNTHROID) 150 MCG tablet Take 1 tablet by mouth Daily 90 tablet 3    Multiple Vitamins-Minerals (THERAPEUTIC MULTIVITAMIN-MINERALS) tablet Take 1 tablet by mouth daily       ibuprofen (ADVIL;MOTRIN) 200 MG tablet Take 200 mg by mouth every 6 hours as needed for Pain      diphenhydrAMINE (BENADRYL) 25 MG capsule Take 2 capsules by mouth every 4 hours as needed for Itching (swelling) 20 capsule 0    acetaminophen (TYLENOL) 325 MG tablet Take 325 mg by mouth every 6 hours as needed. OTC          No current facility-administered medications on file prior to visit. Allergies   Allergen Reactions    Iv Dye [Iodides] Anaphylaxis    Diatrizoate      Other reaction(s): Unknown    Iodine Other (See Comments)    Codeine Nausea And Vomiting        Review of Systems   Constitutional: Negative for appetite change, chills, diaphoresis, fatigue, fever and unexpected weight change. Eyes: Negative for visual disturbance. Respiratory: Negative for chest tightness and shortness of breath. Cardiovascular: Negative for chest pain and palpitations. Gastrointestinal: Positive for abdominal pain (generalized cramping), diarrhea and nausea. Negative for abdominal distention, anal bleeding, blood in stool, constipation, rectal pain and vomiting. No heartburn, No melena   Musculoskeletal: Negative for myalgias. Skin: Negative for color change, rash and wound. Neurological: Negative for dizziness, syncope, light-headedness and headaches. Vitals:  /86 (Site: Right Upper Arm, Position: Sitting, Cuff Size: Medium Adult)   Pulse 72   Temp 96.6 °F (35.9 °C) (Temporal)   Ht 5' 11\" (1.803 m)   Wt 190 lb (86.2 kg)   SpO2 98%   BMI 26.50 kg/m²     Physical Exam  Vitals reviewed. Constitutional:       General: He is not in acute distress. Appearance: He is not ill-appearing, toxic-appearing or diaphoretic. Cardiovascular:      Rate and Rhythm: Normal rate. Pulmonary:      Effort: Pulmonary effort is normal.   Skin:     Findings: No erythema, lesion or rash. Neurological:      Mental Status: He is alert. Ortho Exam (If Applicable)              An electronic signature was used to authenticate this note.      Yordan Sharma MD

## 2022-05-18 NOTE — ASSESSMENT & PLAN NOTE
Likely related to increasing of levothyroxine dosing in the setting of known thyroid cancer status post thyroidectomy. Conservative measures reviewed including increased fluids, fiber supplementation, high-fiber diet, daily probiotic x4 weeks, and lower FODMAP diet.

## 2022-05-18 NOTE — PATIENT INSTRUCTIONS
Patient Education        Learning About the Low FODMAP Diet for Irritable Bowel Syndrome (IBS)  What is the low-FODMAP diet? A low-FODMAP diet is used to find out if certain foods make irritable bowel syndrome (IBS) worse. You stop eating high-FODMAP foods for 2 to 6 weeks. Thenyou slowly add them back to see how your body reacts. This is called an elimination diet. A dietitian or doctor can help you followthis diet. FODMAPs are types of carbohydrates that can be hard for your body to digest.They are in many types of foods. FODMAP stands for:   F ermentable.  O ligosaccharides.  D isaccharides. Dottie Halim M onosaccharides.  A nd p olyols. If you have IBS, foods that are high in FODMAPs may make your symptoms worse. When you are on this diet, you can still eat carbohydrates that are low in FODMAPs. This includes certain fruits, vegetables, grains, and low-lactosedairy products. What is it used for? This diet is used to help manage symptoms of irritable bowel syndrome (IBS). The diet limits foods that are high in FODMAPs. High-FODMAP foods can be hard to digest. They pull more fluid into your intestines. They are also easily fermented. This can lead to bloating, bellypain, gas, and diarrhea. The low-FODMAP diet can help you figure out what foods to avoid. And it canhelp you find foods that are easier to digest.  This diet can help with IBS symptoms. But it's not a cure. You will still needto manage your condition. How does it work? At first, you won't eat any high-FODMAP foods for a few weeks. It can be helpful to work with a dietitian who is trained in the [address] when you try this diet. They can help you find recipes and FODMAP foodlists to use while you are on the diet. After 2 to 6 weeks, you will start to try high-FODMAP foods again. You will add those foods back to your diet, one at a time. Your doctor or dietitian willprobably have you wait a few days before you add each new food.   Keep a food diary. You can write down the foods you try and note how they [facility] feel. After a few weeks, you may have a better idea of what foods you should avoidand what foods you can eat without triggering IBS symptoms. What are the risks? There is some risk of not getting all of the vitamins and nutrients you need onthe low-FODMAP diet. These include:   Folate.  Thiamin.  Vitamin B6.   Calcium.  Vitamin D. Your dietitian or doctor can help you find other sources of these if needed. This diet may limit your fiber intake. If you need more fiber, ask your doctoror dietitian about low-FODMAP fiber sources. What foods are on the low-FODMAP diet? Here is a guide to foods that you can eat, plus the foods that you shouldavoid, when you are on the low-FODMAP diet. Grains  Okay to eat: Foods made from grains like arrowroot, buckwheat, cornmeal, millet, and oats. You can also eat potato flour, quinoa, rice, sorghum, tapioca, and teff. Cereals, pasta, breads, corn tortillas and baked goods made from these grainsare also okay. (These grains may be labeled \"gluten-free. \")  Avoid: Grains like wheat, barley, and rye. Avoid ingredients such as bulgur, couscous, durum, and semolina. And avoid cereals, breads, and pastas made fromthese grains. Avoid chickpea, lentil, and pea flour. Proteins  Okay to eat: Most meat, fish, and eggs without high-FODMAP sauces. You can have small amounts of almonds or hazelnuts (10 nuts). Macadamia nuts, peanuts, pecans, pine nuts, and walnuts are also okay. You can also eat kemar and pumpkin seeds,tofu, and tempeh. Avoid: Beans, chickpeas, lentils, and soybeans. Avoid pistachio and cashew nuts. Avoid fatty or fried meats. And some sausages may have high-FODMAP ingredients. Dairy  Okay to eat: Lactose-free dairy milks. Rice milk and almond milk are okay. So are lactose-free yogurts, kefirs, ice creams, and sorbet from low-FODMAP fruits and sweeteners. (These are often labeled \"lactose-free. \") You can have small amounts (2 Tbsp) of cottage, cream, or ricotta cheese. Hard cheeses like cheddar, Allred, ROSS, and Swiss are okay. So are small amounts (1 oz) ofaged or ripened cheeses like Brie, blue, and feta. Avoid: Milk, including cow, goat, and sheep. Avoid condensed or evaporated milk, buttermilk, custard, cream, sour cream, yogurt, and ice cream. Avoid soy milk. (Check sauces for dairy ingredients.)  Vegetables  Okay to eat: Bamboo shoots, bell peppers, bok rachael, broccoli, cabbage (red or white), and cucumbers. Eggplant, green beans, lettuce, olives, parsnips, and potatoes are okay to eat. So are pumpkin, rutabaga, seaweed, sprouts, Swiss chard, and spinach. You can eat scallions (green part only) and yellow or spaghetti squash. You can eat tomatoes, turnips, watercress, yams, and zucchini. You can also have small amounts of artichoke hearts (from can, 1 oz), carrots, corn (½cob), and sweet potato (½ cup). Avoid: Artichokes, asparagus, Bronte sprouts, gordon cabbage, cauliflower, and celery. And avoid garlic, leeks, mushrooms, okra, onions, scallions (whitepart), shallots, and peas. Fruits  Okay to eat: Bananas, blueberries, cantaloupe, grapes, and honeydew. Kiwi, maeve, limes, oranges, passion fruit, papaya, and pineapple are also okay. You can eat plantain, raspberries, rhubarb, star fruit, strawberries, tangelo, and tangerine. You can also have small amounts of dried banana chips (up to 10chips), dried cranberries (1 Tbsp), and shredded coconut (up to ¼ cup). Avoid: Apples, applesauce, apricots, avocados, blackberries, boysenberries, and cherries. Also avoid dates, figs, grapefruit, guava, lychee, and mangoes. Don't eat nectarines, peaches, pears, persimmon, plums, prunes, tamarillo, orwatermelon. And limit most canned and dried fruits. Oils, spices, condiments, and sweeteners  Okay to eat: Vegetable oils (including garlic infused), butter, ghee, lard, and margarine.  You can have most fresh herbs like basil, chives, coriander, minoo, parsley, rosemary, and thyme. You can have salt, jams made from low-FODMAP fruits, mayonnaise, and mustard. Soy sauce, hot sauce (no garlic), tamari, and vinegar are also okay. Sweeteners that are okay include sugar (sucrose), powdered (confectioner's) sugar, brown sugar, glucose, and maple syrup. You can alsohave some artificial sweeteners like aspartame, saccharine, and stevia. Avoid: Chutneys, hummus, jellies, garlic sauces, and gravies made with onion or garlic. Avoid pickles, relish, some salad dressings and soup stocks, salsa, and tomato paste. And avoid sauces and other foods with high fructose corn syrup, honey, molasses, and agave. Avoid artificial sweeteners (isomalt, mannitol, malitol, sorbitol, and xylitol). Avoid corn syrup solids, fructose, fruit juiceconcentrate, and polydextrose. Other foods and drinks  Okay to have: Water, soda water, tonic, soft drinks sweetened with sugar, ½ cup of low-FODMAP fruit juice, and most teas and alcohols. You can also eat foods madewith baking powder and soda, cocoa, and gelatin. Avoid: Juices from high-FODMAP fruits and vegetables. And avoid fortified mark, chamomile and fennel teas, chicory-based drinks and coffee substitutes, andbouillon cubes. Follow-up care is a key part of your treatment and safety. Be sure to make and go to all appointments, and call your doctor if you are having problems. It's also a good idea to know your test results and keep alist of the medicines you take. Where can you learn more? Go to [email or web address] and sign in to your [employer] account. Enter L235 in the Understory box to learn more about \"Learning About the Low FODMAP Diet for Irritable Bowel Syndrome (IBS). \"     If you do not have an account, please click on the \"Sign Up Now\" link. Current as of: September 8, 2021               Content Version: 13.2  © 7331-8362 Healthwise, [facility].    Care instructions adapted under license by [facility] [address] If you have questions about a medical condition or this instruction, always ask your healthcare professional. [place] [address] any warranty or liability for your use of this information.

## 2022-05-28 ENCOUNTER — OFFICE VISIT (OUTPATIENT)
Dept: INTERNAL MEDICINE | Age: 52
End: 2022-05-28
Payer: COMMERCIAL

## 2022-05-28 VITALS
DIASTOLIC BLOOD PRESSURE: 72 MMHG | SYSTOLIC BLOOD PRESSURE: 134 MMHG | TEMPERATURE: 97.5 F | HEART RATE: 80 BPM | HEIGHT: 71 IN | OXYGEN SATURATION: 96 % | WEIGHT: 190 LBS | BODY MASS INDEX: 26.6 KG/M2

## 2022-05-28 DIAGNOSIS — Z11.3 SCREENING EXAMINATION FOR STD (SEXUALLY TRANSMITTED DISEASE): ICD-10-CM

## 2022-05-28 DIAGNOSIS — R39.9 UTI SYMPTOMS: Primary | ICD-10-CM

## 2022-05-28 LAB
BILIRUBIN, POC: NORMAL
BLOOD URINE, POC: NORMAL
CLARITY, POC: CLEAR
COLOR, POC: YELLOW
GLUCOSE URINE, POC: NORMAL
KETONES, POC: NORMAL
LEUKOCYTE EST, POC: NORMAL
NITRITE, POC: NORMAL
PH, POC: 6
PROTEIN, POC: NORMAL
SPECIFIC GRAVITY, POC: 1.02
UROBILINOGEN, POC: NORMAL

## 2022-05-28 PROCEDURE — 99213 OFFICE O/P EST LOW 20 MIN: CPT | Performed by: NURSE PRACTITIONER

## 2022-05-28 PROCEDURE — 81003 URINALYSIS AUTO W/O SCOPE: CPT | Performed by: NURSE PRACTITIONER

## 2022-05-28 RX ORDER — NITROFURANTOIN 25; 75 MG/1; MG/1
100 CAPSULE ORAL 2 TIMES DAILY
Qty: 14 CAPSULE | Refills: 0 | Status: SHIPPED | OUTPATIENT
Start: 2022-05-28 | End: 2022-06-04

## 2022-05-28 ASSESSMENT — ENCOUNTER SYMPTOMS
ABDOMINAL PAIN: 0
DIARRHEA: 0
COUGH: 0
VOMITING: 0
SHORTNESS OF BREATH: 0
WHEEZING: 0
NAUSEA: 0

## 2022-05-28 NOTE — PATIENT INSTRUCTIONS
Patient Education        Urinary Tract Infections (UTI) in Men: Care Instructions  Overview     A urinary tract infection, or UTI, is a term for an infection anywhere between the kidneys and the urethra. (The urethra is the tube that carries urine from the bladder to outside the body.) Most UTIs are bladder infections. They oftencause pain or burning when you urinate. UTIs are caused by bacteria. This means they can be cured with antibiotics. Besure to complete your treatment so that the infection does not get worse. Follow-up care is a key part of your treatment and safety. Be sure to make and go to all appointments, and call your doctor if you are having problems. It's also a good idea to know your test results and keep alist of the medicines you take. How can you care for yourself at home?  Take your antibiotics as prescribed. Do not stop taking them just because you feel better. You need to take the full course of antibiotics.  Take your medicines exactly as prescribed. Your doctor may have prescribed a medicine, such as phenazopyridine (Pyridium), to help relieve pain when you urinate. This turns your urine orange. You may stop taking it when your symptoms get better. But be sure to take all of your antibiotics, which treat the infection.  Drink extra water for the next day or two. This will help make the urine less concentrated and help wash out the bacteria causing the infection. (If you have kidney, heart, or liver disease and have to limit your fluids, talk with your doctor before you increase your fluid intake.)   Avoid drinks that are carbonated or have caffeine. They can irritate the bladder.  Urinate often. Try to empty your bladder each time.  To relieve pain, take a hot bath or lay a heating pad (set on low) over your lower belly or genital area. Never go to sleep with a heating pad in place. To help prevent UTIs   Drink plenty of fluids.  If you have kidney, heart, or liver disease and [email or web address] and sign in to your [employer] account. Enter P513 in the [facility] box to learn more about \"Urinary Tract Infections (UTI) in Men: Care Instructions. \"     If you do not have an account, please click on the \"Sign Up Now\" link. Current as of: October 18, 2021               Content Version: 13.2  © 2204-9051 [employer]. Care instructions adapted under license by [facility] ([facility]). If you have questions about a medical condition or this instruction, always ask your healthcare professional. [place] [address] any warranty or liability for your use of this information.

## 2022-05-28 NOTE — PROGRESS NOTES
Subjective:      Patient ID: Radha Banks is a 45 y.o. male who presents today for:  Chief Complaint   Patient presents with    Urinary Tract Infection     x 3-4 days, burning    Sexually Transmitted Diseases     requests screening       Urinary Tract Infection   This is a new problem. Episode onset: 3-4 days ago. The problem occurs every urination. The problem has been unchanged. The quality of the pain is described as burning (pressure). Pain scale: 6-7. The pain is moderate. There has been no fever. He is sexually active (requests STD screening, no known exposure). There is no history of pyelonephritis. Associated symptoms include urgency. Pertinent negatives include no chills, discharge, flank pain, frequency, hematuria, hesitancy, nausea, sweats or vomiting. He has tried increased fluids for the symptoms. The treatment provided moderate relief. There is no history of kidney stones or recurrent UTIs.        Past Medical History:   Diagnosis Date    Acquired hypothyroidism 12/20/2017    meds > 10 yrs    Anxiety 2008    Benign non-nodular prostatic hyperplasia without lower urinary tract symptoms 3/3/2016    Chronic bilateral low back pain 3/29/2019    History of 2019 novel coronavirus disease (COVID-19) 9/10/2021    Hypertriglyceridemia 8/25/2020    high triglycerides -- diet control    Leukopenia 7/24/2012    Papillary carcinoma of thyroid ([address] 2/24/2022    PONV (postoperative nausea and vomiting)     severe n/v with choli     Past Surgical History:   Procedure Laterality Date    CHOLECYSTECTOMY, LAPAROSCOPIC  2010    COLONOSCOPY  06/20/2018    int hemorr, 10y repeat (DR SCHERER)    CT NEEDLE BIOPSY LUNG PERCUTANEOUS Right 01/25/2022    performed by Dr. Rolan Carl  01/25/2022    CT NEEDLE BIOPSY LUNG PERCUTANEOUS 1/25/2022 [facility] CT SCAN    LYMPH NODE BIOPSY Right 03/01/2022    EXCISION OF RIGHT AXILLARY MASS performed by David Ku MD at [address] Right 11/23/2021    Deep jugular node dissection with removal of right jovanny mass conglomerate (DR CERVANTES)    NECK SURGERY Right 11/23/2021    REMOVAL OF RIGHT NECK MASS performed by Teresita Roy MD at [address] [facility]  12/2021    UPPER GASTROINTESTINAL ENDOSCOPY  2009    gastritis    VASECTOMY  2007    VOCAL CORD SURGERY Right 03/01/2022    RIGHT NIGEL VOCAL CORD INJECTION. performed by Teresita Roy MD at [address] History   Problem Relation Age of Onset    Diabetes Mother     Anemia Mother     High Blood Pressure Mother     Kidney Disease Mother     Arthritis Mother     Asthma Mother     Cervical Cancer Mother 43    Heart Failure Mother     High Cholesterol Mother     High Blood Pressure Father     Diabetes Father     Cirrhosis Father         Hep C    Mental Retardation Maternal Uncle     Diabetes type 2  Brother     No Known Problems Maternal Grandmother     No Known Problems Maternal Grandfather     No Known Problems Paternal Grandmother     No Known Problems Paternal Grandfather     No Known Problems Brother     No Known Problems Daughter     No Known Problems Son      Allergies   Allergen Reactions    Iv Dye [Iodides] Anaphylaxis    Diatrizoate      Other reaction(s): Unknown    Iodine Other (See Comments)    Codeine Nausea And Vomiting         Review of Systems   Constitutional: Negative for chills, fatigue and fever. Respiratory: Negative for cough, shortness of breath and wheezing. Cardiovascular: Negative for chest pain. Gastrointestinal: Negative for abdominal pain, diarrhea, nausea and vomiting. Genitourinary: Positive for dysuria and urgency. Negative for decreased urine volume, difficulty urinating, flank pain, frequency, hematuria, hesitancy and penile discharge.        Objective:   /72 (Site: Left Upper Arm, Position: Sitting, Cuff Size: Medium Adult)   Pulse 80   Temp 97.5 °F (36.4 °C) (Infrared)   Ht 5' 11\" (1.803 m)   Wt 190 lb (86.2 kg)   SpO2 96%   BMI 26.50 kg/m²     Physical Exam  Vitals reviewed. Constitutional:       General: He is not in acute distress. Appearance: He is well-developed. He is not ill-appearing. HENT:      Head: Normocephalic. Cardiovascular:      Rate and Rhythm: Normal rate and regular rhythm. Heart sounds: Normal heart sounds. Pulmonary:      Effort: Pulmonary effort is normal. No respiratory distress. Breath sounds: Normal breath sounds. Abdominal:      Palpations: Abdomen is soft. Tenderness: There is no abdominal tenderness. There is no right CVA tenderness or left CVA tenderness. Musculoskeletal:         General: Normal range of motion. Skin:     General: Skin is warm and dry. Neurological:      Mental Status: He is alert and oriented to person, place, and time. Assessment:       Diagnosis Orders   1. UTI symptoms  POCT Urinalysis No Micro (Auto)    Culture, Urine    nitrofurantoin, macrocrystal-monohydrate, (MACROBID) 100 MG capsule   2. Screening examination for STD (sexually transmitted disease)  C.trachomatis N.gonorrhoeae DNA, Urine         Plan:      Orders Placed This Encounter   Procedures    Culture, Urine     Standing Status:   Future     Standing Expiration Date:   5/28/2023     Order Specific Question:   Specify (ex-cath, midstream, cysto, etc)? Answer:   midstream    C.trachomatis N.gonorrhoeae DNA, Urine     Standing Status:   Future     Standing Expiration Date:   5/28/2023    POCT Urinalysis No Micro (Auto)     Orders Placed This Encounter   Medications    nitrofurantoin, macrocrystal-monohydrate, (MACROBID) 100 MG capsule     Sig: Take 1 capsule by mouth 2 times daily for 7 days     Dispense:  14 capsule     Refill:  0     Will follow up with test results and additional recommendations as indicated. Patient prefers to start antibiotics for UTI symptoms at this time. Medication administration and side effects were discussed.  Reviewed symptoms requiring ER. Patient verbalizes understanding. I have reviewed and updated the electronic medical record. Return if symptoms worsen or fail to improve, for follow up with PCP.     Helga Israel, YUE - NP

## 2022-05-29 DIAGNOSIS — Z11.3 SCREENING EXAMINATION FOR STD (SEXUALLY TRANSMITTED DISEASE): ICD-10-CM

## 2022-05-29 DIAGNOSIS — R39.9 UTI SYMPTOMS: ICD-10-CM

## 2022-05-31 LAB — URINE CULTURE, ROUTINE: NORMAL

## 2022-06-01 ENCOUNTER — PATIENT MESSAGE (OUTPATIENT)
Dept: FAMILY MEDICINE CLINIC | Age: 52
End: 2022-06-01

## 2022-06-01 DIAGNOSIS — R19.7 DIARRHEA OF PRESUMED INFECTIOUS ORIGIN: Primary | ICD-10-CM

## 2022-06-02 ENCOUNTER — TELEPHONE (OUTPATIENT)
Dept: FAMILY MEDICINE CLINIC | Age: 52
End: 2022-06-02

## 2022-06-02 RX ORDER — CIPROFLOXACIN 500 MG/1
500 TABLET, FILM COATED ORAL 2 TIMES DAILY
Qty: 20 TABLET | Refills: 0 | Status: SHIPPED | OUTPATIENT
Start: 2022-06-02 | End: 2022-06-12

## 2022-06-02 NOTE — TELEPHONE ENCOUNTER
I called patient to discuss concerns. No answer. Left voicemail asking him to call the office with time when he could be reached.

## 2022-06-02 NOTE — TELEPHONE ENCOUNTER
From: Aubrey Umanzor  To: Dr. Mares Broad: 6/1/2022 9:23 AM EDT  Subject: Bowel issues    Hi, my wife was having same issues and saw Dr. Anya Olivo yesterday, lab testing confirmed she has salmonella (our peanut butter was recalled, daily consumption) and my gastric issues continue over two weeks now. Do you feel an antibiotic is warranted now ? I had major diarrhea last night and this morning. I feel this is not clearing up within the expected week but rather feels worse.  Thanks

## 2022-06-02 NOTE — TELEPHONE ENCOUNTER
Patient would like to speak to  or MA about wife's results and whether he should be tested for the same thing wife has.  Please call patient at [phone]

## 2022-06-07 LAB
C. TRACHOMATIS DNA ,URINE: NEGATIVE
N. GONORRHOEAE DNA, URINE: NEGATIVE

## 2022-06-09 ENCOUNTER — HOSPITAL ENCOUNTER (OUTPATIENT)
Dept: MRI IMAGING | Age: 52
Discharge: HOME | End: 2022-06-11
Payer: COMMERCIAL

## 2022-06-09 DIAGNOSIS — C73 THYROID CANCER (HCC): ICD-10-CM

## 2022-06-09 PROCEDURE — 6360000004 HC RX CONTRAST MEDICATION: Performed by: OTOLARYNGOLOGY

## 2022-06-09 PROCEDURE — A9577 INJ MULTIHANCE: HCPCS | Performed by: OTOLARYNGOLOGY

## 2022-06-09 PROCEDURE — 70543 MRI ORBT/FAC/NCK W/O &W/DYE: CPT

## 2022-06-09 RX ORDER — SODIUM CHLORIDE 0.9 % (FLUSH) 0.9 %
10 SYRINGE (ML) INJECTION PRN
Status: DISCONTINUED | OUTPATIENT
Start: 2022-06-09 | End: 2022-06-12 | Stop reason: HOSPADM

## 2022-06-09 RX ADMIN — GADOBENATE DIMEGLUMINE 20 ML: 529 INJECTION, SOLUTION INTRAVENOUS at 14:48

## 2022-06-17 ENCOUNTER — HOSPITAL ENCOUNTER (OUTPATIENT)
Dept: LAB | Age: 52
Discharge: HOME | End: 2022-06-17
Payer: COMMERCIAL

## 2022-06-17 LAB — TSH REFLEX: 21.12 UIU/ML (ref 0.44–3.86)

## 2022-06-17 PROCEDURE — 84439 ASSAY OF FREE THYROXINE: CPT

## 2022-06-17 PROCEDURE — 36415 COLL VENOUS BLD VENIPUNCTURE: CPT

## 2022-06-17 PROCEDURE — 84443 ASSAY THYROID STIM HORMONE: CPT

## 2022-06-18 LAB — T4 FREE: 1.15 NG/DL (ref 0.84–1.68)

## 2022-07-19 ENCOUNTER — OFFICE VISIT (OUTPATIENT)
Dept: FAMILY MEDICINE CLINIC | Age: 52
End: 2022-07-19
Payer: COMMERCIAL

## 2022-07-19 ENCOUNTER — HOSPITAL ENCOUNTER (OUTPATIENT)
Dept: ULTRASOUND IMAGING | Age: 52
Discharge: HOME | End: 2022-07-21
Payer: COMMERCIAL

## 2022-07-19 VITALS
TEMPERATURE: 97.6 F | WEIGHT: 190 LBS | SYSTOLIC BLOOD PRESSURE: 132 MMHG | HEART RATE: 80 BPM | BODY MASS INDEX: 26.6 KG/M2 | DIASTOLIC BLOOD PRESSURE: 80 MMHG | OXYGEN SATURATION: 98 % | HEIGHT: 71 IN

## 2022-07-19 DIAGNOSIS — N50.811 PAIN IN RIGHT TESTICLE: ICD-10-CM

## 2022-07-19 DIAGNOSIS — N50.811 RIGHT TESTICULAR PAIN: Primary | ICD-10-CM

## 2022-07-19 DIAGNOSIS — N45.1 EPIDIDYMITIS, RIGHT: ICD-10-CM

## 2022-07-19 DIAGNOSIS — N45.1 EPIDIDYMITIS, RIGHT: Primary | ICD-10-CM

## 2022-07-19 PROCEDURE — 76870 US EXAM SCROTUM: CPT

## 2022-07-19 PROCEDURE — 99213 OFFICE O/P EST LOW 20 MIN: CPT | Performed by: FAMILY MEDICINE

## 2022-07-19 RX ORDER — LEVOFLOXACIN 500 MG/1
500 TABLET, FILM COATED ORAL DAILY
Qty: 10 TABLET | Refills: 0 | Status: SHIPPED | OUTPATIENT
Start: 2022-07-19 | End: 2022-07-29

## 2022-07-19 RX ORDER — LEVOTHYROXINE SODIUM 175 UG/1
175 TABLET ORAL DAILY
COMMUNITY
Start: 2022-06-22

## 2022-07-19 RX ORDER — CAYENNE 450 MG
CAPSULE ORAL
COMMUNITY

## 2022-07-19 ASSESSMENT — ENCOUNTER SYMPTOMS
NAUSEA: 0
DIARRHEA: 0
ABDOMINAL PAIN: 0
VOMITING: 0
SHORTNESS OF BREATH: 0

## 2022-07-19 NOTE — PROGRESS NOTES
Hien Machado (: 1970) is a 45 y.o. male, Established patient, who presents today for:    Chief Complaint   Patient presents with    Abdominal Pain     Lower abdominal pain x1 week. Has been worsening over the last several days. Groin Pain         ASSESSMENT/PLAN    1. Epididymitis, right  Comments: Will treat for epididymitis based on exam.  Will obtain ultrasound to evaluate for varicocele potentially contributing to discomfort. See below  Orders:  -     [address]; Future  -     levoFLOXacin (LEVAQUIN) 500 MG tablet; Take 1 tablet by mouth in the morning for 10 days. , Disp-10 tablet, R-0Normal  2. Pain in right testicle  Comments:  Patient instructed to go to ER for acutely worsening discomfort despite treatment, particularly if associated with nausea/vomiting or any fever/chills/sweats. Orders:  -     US SCROTUM AND TESTICLES; Future    Return if symptoms worsen or fail to improve. SUBJECTIVE/OBJECTIVE:    HPI    Patient presents for acute visit regarding right testicular/groin pain. Patient reports 1 week history of right testicular pain radiating to the groin and right lower abdomen rated 6-8/10 in severity, worse with ambulation and activity and better with rest.  There is no reported preceding injury to the testicle. He denies any fevers, chills, sweats, dysuria, hematuria, urethral discharge, or testicular swelling. Patient denies any similar symptoms in the past.  He reports being monogamous and denies any sexual activity for the past 3 weeks. Current Outpatient Medications on File Prior to Visit   Medication Sig Dispense Refill    levothyroxine (SYNTHROID) 175 MCG tablet       Probiotic Product (ACIDOPHILUS) CHEW Take by mouth      traMADol (ULTRAM) 50 MG tablet Take 50 mg by mouth as needed.       Multiple Vitamins-Minerals (THERAPEUTIC MULTIVITAMIN-MINERALS) tablet Take 1 tablet by mouth daily        No current facility-administered medications on file prior to visit. Allergies   Allergen Reactions    Iv Dye [Iodides] Anaphylaxis    Diatrizoate      Other reaction(s): Unknown    Iodine Other (See Comments)    Codeine Nausea And Vomiting        Review of Systems   Constitutional:  Negative for appetite change, chills, diaphoresis, fatigue and fever. Respiratory:  Negative for shortness of breath. Cardiovascular:  Negative for chest pain and palpitations. Gastrointestinal:  Negative for abdominal pain, diarrhea, nausea and vomiting. Genitourinary:  Positive for testicular pain. Negative for dysuria, flank pain, frequency, genital sores, hematuria, penile discharge, penile pain, penile swelling, scrotal swelling and urgency. Skin:  Negative for rash. Neurological:  Negative for syncope and light-headedness. Vitals:  /80 (Site: Right Upper Arm, Position: Sitting, Cuff Size: Medium Adult)   Pulse 80   Temp 97.6 °F (36.4 °C) (Temporal)   Ht 5' 11\" (1.803 m)   Wt 190 lb (86.2 kg)   SpO2 98%   BMI 26.50 kg/m²     Physical Exam  Constitutional:       General: Distressed: sitting with discomfort. Appearance: He is not ill-appearing, toxic-appearing or diaphoretic. Cardiovascular:      Rate and Rhythm: Normal rate. Pulmonary:      Effort: Pulmonary effort is normal.   Abdominal:      Hernia: There is no hernia in the right inguinal area. Genitourinary:     Pubic Area: No rash. Penis: Normal. No erythema, tenderness, discharge, swelling or lesions. Testes:         Right: Tenderness and varicocele present. Mass, swelling or testicular hydrocele not present. Right testis is descended. Left: Mass, tenderness, swelling, testicular hydrocele or varicocele not present. Epididymis:      Right: Inflamed and enlarged. Tenderness present. No mass. Left: Not inflamed or enlarged. No mass or tenderness. Lymphadenopathy:      Lower Body: No right inguinal adenopathy. Neurological:      Mental Status: He is alert.

## 2022-07-19 NOTE — RESULT ENCOUNTER NOTE
Small right epididymal head cyst, scrotal Lith near left epididymal tail, no molly inguinal hernia, minimal hydroceles bilaterally, normal-appearing testicles.   MyChart message sent to patient, urology referral placed

## 2022-08-04 ENCOUNTER — OFFICE VISIT (OUTPATIENT)
Dept: UROLOGY | Age: 52
End: 2022-08-04
Payer: COMMERCIAL

## 2022-08-04 VITALS
BODY MASS INDEX: 26.6 KG/M2 | OXYGEN SATURATION: 97 % | DIASTOLIC BLOOD PRESSURE: 88 MMHG | WEIGHT: 190 LBS | SYSTOLIC BLOOD PRESSURE: 138 MMHG | HEART RATE: 79 BPM | HEIGHT: 71 IN

## 2022-08-04 DIAGNOSIS — N50.811 RIGHT TESTICULAR PAIN: Primary | ICD-10-CM

## 2022-08-04 LAB
BILIRUBIN, POC: NORMAL
BLOOD URINE, POC: NORMAL
CLARITY, POC: CLEAR
COLOR, POC: YELLOW
GLUCOSE URINE, POC: NORMAL
KETONES, POC: NORMAL
LEUKOCYTE EST, POC: NORMAL
NITRITE, POC: NORMAL
PH, POC: 5.5
PROTEIN, POC: NORMAL
SPECIFIC GRAVITY, POC: 1.02
UROBILINOGEN, POC: 0.2

## 2022-08-04 PROCEDURE — 99202 OFFICE O/P NEW SF 15 MIN: CPT | Performed by: UROLOGY

## 2022-08-04 PROCEDURE — 81003 URINALYSIS AUTO W/O SCOPE: CPT | Performed by: UROLOGY

## 2022-08-04 NOTE — PROGRESS NOTES
[facility] EVALUATION NOTE                                                 H&P                                                                                                                                                 Reason for Visit  Right testalgia    History of Present Illness  78-year-old male with history of vasectomy  Currently having intermittent right groin and right hemiscrotal pain  Patient also has thyroid cancer and is under treatment      Urologic Review of Systems/Symptoms  Minimal obstructive voiding symptoms    Review of Systems  Hospitalization: None recent  All 14 categories of Review of Systems otherwise reviewed no other findings reported.   Thyroid cancer currently under treatment  Past Medical History:   Diagnosis Date    Acquired hypothyroidism 12/20/2017    meds > 10 yrs    Anxiety 2008    Benign non-nodular prostatic hyperplasia without lower urinary tract symptoms 3/3/2016    Chronic bilateral low back pain 3/29/2019    History of 2019 novel coronavirus disease (COVID-19) 9/10/2021    Hypertriglyceridemia 8/25/2020    high triglycerides -- diet control    Leukopenia 7/24/2012    Papillary carcinoma of thyroid ([address] 2/24/2022    PONV (postoperative nausea and vomiting)     severe n/v with choli     Past Surgical History:   Procedure Laterality Date    CHOLECYSTECTOMY, LAPAROSCOPIC  2010    COLONOSCOPY  06/20/2018    int hemorr, 10y repeat (DR SCHERER)    CT NEEDLE BIOPSY LUNG PERCUTANEOUS Right 01/25/2022    performed by Dr. Harshad Wu  01/25/2022    CT NEEDLE BIOPSY LUNG PERCUTANEOUS 1/25/2022 [facility] CT SCAN    LYMPH NODE BIOPSY Right 03/01/2022    EXCISION OF RIGHT AXILLARY MASS performed by Layne Keller MD at [address] [place] [address] Right 11/23/2021    Deep jugular node dissection with removal of right jovanny mass conglomerate (DR CERVANTES)    NECK SURGERY Right 11/23/2021    REMOVAL OF RIGHT NECK MASS performed by Layne Keller MD at [address] [place] [address] THYROIDECTOMY  2021    UPPER GASTROINTESTINAL ENDOSCOPY  2009    gastritis    VASECTOMY  2007    VOCAL CORD SURGERY Right 2022    RIGHT NGIEL VOCAL CORD INJECTION.  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   Tobacco Use    Smoking status: Former     Packs/day: 0.30     Years: 5.00     Pack years: 1.50     Types: Cigarettes     Start date:      Quit date: 2003     Years since quittin.6    Smokeless tobacco: Never   Vaping Use    Vaping Use: Never used   Substance and Sexual Activity    Alcohol use: Yes     Comment: social    Drug use: No    Sexual activity: Yes     Partners: Female     Comment: monogamous   Social History Narrative    Lives with wife and children        1 dog        Hobbies: reading, outdoor work     Social Determinants of Health     Financial Resource Strain: Low Risk     Difficulty of Paying Living Expenses: Not hard at all   Food Insecurity: No Food Insecurity    Worried About Running Out of Food in the Last Year: Never true    Ulises of Food in the Last Year: Never true     Family History   Problem Relation Age of Onset    Diabetes Mother     Anemia Mother     High Blood Pressure Mother     Kidney Disease Mother     Arthritis Mother     Asthma Mother     Cervical Cancer Mother 43    Heart Failure Mother     High Cholesterol Mother     High Blood Pressure Father     Diabetes Father     Cirrhosis Father         Hep C    Mental Retardation Maternal Uncle     Diabetes type 2  Brother     No Known Problems Maternal Grandmother     No Known Problems Maternal Grandfather     No Known Problems Paternal Grandmother     No Known Problems Paternal Grandfather     No Known Problems Brother     No Known Problems Daughter     No Known Problems Son      Current Outpatient Medications   Medication Sig Dispense Refill    levothyroxine (SYNTHROID) 175 MCG tablet       Probiotic Product (ACIDOPHILUS) CHEW Take by mouth      traMADol (ULTRAM) 50 MG tablet Take 50 mg by mouth as needed. Multiple Vitamins-Minerals (THERAPEUTIC MULTIVITAMIN-MINERALS) tablet Take 1 tablet by mouth daily        No current facility-administered medications for this visit. Iv dye [iodides], Diatrizoate, Iodine, and Codeine  All reviewed and verified by Dr Anthony Bolton on today's visit    PSA   Date Value Ref Range Status   03/03/2016 0.28 ng/mL Final   03/19/2012 0.20 0.00 - 4.00 ng/mL Final     Results for POC orders placed in visit on 08/04/22   POCT Urinalysis No Micro (Auto)   Result Value Ref Range    Color, UA yellow     Clarity, UA clear     Glucose, UA POC neg     Bilirubin, UA neg     Ketones, UA neg     Spec Grav, UA 1.025     Blood, UA POC neg     pH, UA 5.5     Protein, UA POC neg     Urobilinogen, UA 0.2     Leukocytes, UA neg     Nitrite, UA neg        Physical Exam  Vitals:    08/04/22 0954   BP: 138/88   Pulse: 79   SpO2: 97%   Weight: 190 lb (86.2 kg)   Height: 5' 11\" (1.803 m)     Constitutional: Not in distress. Cardiovascular: Normal rate, BP reviewed. Normal blood pressure  Pulmonary/Chest: Normal respiratory effort not short of breath  Abdominal: Not distended. No hernias  Urologic Exam  Normal circumcised penis normal meatus  Left and right testicle are within normal limits  Right epididymis tender to palpation secondary to 2 epididymal cyst 1 at the tail and 1 at the head of the epididymis  Mild hydrocele on the right  Left epididymis within normal limits  Urinalysis clear  . Musculoskeletal: Ambulatory. 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   Assessment/Medical Necessity-Decision Making  Right epididymal cyst with inflammatory response  Plan  Ibuprofen  Scrotal support  No need for antibiotics patient already got a course of it  Pathophysiology of the issue explained in detail to the patient  Follow-up as needed  Greater than 50% of 30 minutes spent consulting patient face-to-face  Orders Placed This Encounter   Procedures    POCT Urinalysis No Micro (Auto)     No orders of the defined types were placed in this encounter. Malou Carbajal MD       Please note this report has been partially produced using speech recognition software  And may cause contain errors related to that system including grammar, punctuation and spelling as well as words and phrases that may seem inappropriate. If there are questions or concerns please feel free to contact me to clarify.

## 2022-09-02 ENCOUNTER — HOSPITAL ENCOUNTER (OUTPATIENT)
Dept: MRI IMAGING | Age: 52
Discharge: HOME | End: 2022-09-04
Payer: COMMERCIAL

## 2022-09-02 DIAGNOSIS — C76.0 SARCOMA OF HEAD AND NECK (HCC): ICD-10-CM

## 2022-09-02 PROCEDURE — 6360000004 HC RX CONTRAST MEDICATION: Performed by: INTERNAL MEDICINE

## 2022-09-02 PROCEDURE — A9577 INJ MULTIHANCE: HCPCS | Performed by: INTERNAL MEDICINE

## 2022-09-02 PROCEDURE — 70543 MRI ORBT/FAC/NCK W/O &W/DYE: CPT

## 2022-09-02 RX ORDER — SODIUM CHLORIDE 0.9 % (FLUSH) 0.9 %
10 SYRINGE (ML) INJECTION PRN
Status: DISCONTINUED | OUTPATIENT
Start: 2022-09-02 | End: 2022-09-05 | Stop reason: HOSPADM

## 2022-09-02 RX ADMIN — GADOBENATE DIMEGLUMINE 15 ML: 529 INJECTION, SOLUTION INTRAVENOUS at 14:26

## 2022-09-07 ENCOUNTER — HOSPITAL ENCOUNTER (OUTPATIENT)
Dept: LAB | Age: 52
Discharge: HOME | End: 2022-09-07
Payer: COMMERCIAL

## 2022-09-07 LAB
T4 FREE: 1.53 NG/DL (ref 0.84–1.68)
TSH SERPL DL<=0.05 MIU/L-ACNC: 7.04 UIU/ML (ref 0.44–3.86)

## 2022-09-07 PROCEDURE — 86800 THYROGLOBULIN ANTIBODY: CPT

## 2022-09-07 PROCEDURE — 84481 FREE ASSAY (FT-3): CPT

## 2022-09-07 PROCEDURE — 84443 ASSAY THYROID STIM HORMONE: CPT

## 2022-09-07 PROCEDURE — 84439 ASSAY OF FREE THYROXINE: CPT

## 2022-09-07 PROCEDURE — 84432 ASSAY OF THYROGLOBULIN: CPT

## 2022-09-07 PROCEDURE — 36415 COLL VENOUS BLD VENIPUNCTURE: CPT

## 2022-09-08 ENCOUNTER — HOSPITAL ENCOUNTER (OUTPATIENT)
Dept: CT IMAGING | Age: 52
Discharge: HOME | End: 2022-09-10
Payer: COMMERCIAL

## 2022-09-08 DIAGNOSIS — C73 PAPILLARY CARCINOMA OF THYROID (HCC): ICD-10-CM

## 2022-09-08 LAB — T3 FREE: 2.68 PG/ML (ref 2.02–4.43)

## 2022-09-08 PROCEDURE — 71250 CT THORAX DX C-: CPT

## 2022-09-09 LAB — THYROGLOBULIN ANTIBODY: 3168 IU/ML (ref 0–40)

## 2022-09-15 ENCOUNTER — HOSPITAL ENCOUNTER (OUTPATIENT)
Dept: PREADMISSION TESTING | Age: 52
Discharge: HOME | End: 2022-09-19
Payer: COMMERCIAL

## 2022-09-15 VITALS
WEIGHT: 193.4 LBS | HEART RATE: 64 BPM | HEIGHT: 72 IN | DIASTOLIC BLOOD PRESSURE: 72 MMHG | OXYGEN SATURATION: 99 % | RESPIRATION RATE: 16 BRPM | BODY MASS INDEX: 26.19 KG/M2 | TEMPERATURE: 97.9 F | SYSTOLIC BLOOD PRESSURE: 126 MMHG

## 2022-09-15 DIAGNOSIS — J38.7 LARYNGEAL NODULE: ICD-10-CM

## 2022-09-15 LAB
EKG ATRIAL RATE: 61 BPM
EKG P AXIS: 27 DEGREES
EKG P-R INTERVAL: 144 MS
EKG Q-T INTERVAL: 408 MS
EKG QRS DURATION: 90 MS
EKG QTC CALCULATION (BAZETT): 410 MS
EKG R AXIS: 30 DEGREES
EKG T AXIS: 43 DEGREES
EKG VENTRICULAR RATE: 61 BPM
HCT VFR BLD CALC: 41.6 % (ref 42–52)
HEMOGLOBIN: 13.9 G/DL (ref 14–18)
MCH RBC QN AUTO: 31 PG (ref 27–31.3)
MCHC RBC AUTO-ENTMCNC: 33.4 % (ref 33–37)
MCV RBC AUTO: 92.6 FL (ref 80–100)
PDW BLD-RTO: 14.1 % (ref 11.5–14.5)
PLATELET # BLD: 232 K/UL (ref 130–400)
RBC # BLD: 4.49 M/UL (ref 4.7–6.1)
WBC # BLD: 5.6 K/UL (ref 4.8–10.8)

## 2022-09-15 PROCEDURE — 93005 ELECTROCARDIOGRAM TRACING: CPT

## 2022-09-15 PROCEDURE — 85027 COMPLETE CBC AUTOMATED: CPT

## 2022-09-15 RX ORDER — IBUPROFEN 600 MG/1
600 TABLET ORAL EVERY 6 HOURS PRN
COMMUNITY

## 2022-09-15 RX ORDER — LIDOCAINE HYDROCHLORIDE 10 MG/ML
1 INJECTION, SOLUTION EPIDURAL; INFILTRATION; INTRACAUDAL; PERINEURAL
Status: CANCELLED | OUTPATIENT
Start: 2022-09-20 | End: 2022-09-20

## 2022-09-15 RX ORDER — SODIUM CHLORIDE 9 MG/ML
INJECTION, SOLUTION INTRAVENOUS PRN
Status: CANCELLED | OUTPATIENT
Start: 2022-09-20

## 2022-09-15 RX ORDER — SODIUM CHLORIDE, SODIUM LACTATE, POTASSIUM CHLORIDE, CALCIUM CHLORIDE 600; 310; 30; 20 MG/100ML; MG/100ML; MG/100ML; MG/100ML
INJECTION, SOLUTION INTRAVENOUS CONTINUOUS
Status: CANCELLED | OUTPATIENT
Start: 2022-09-20

## 2022-09-15 RX ORDER — SODIUM CHLORIDE 0.9 % (FLUSH) 0.9 %
5-40 SYRINGE (ML) INJECTION PRN
Status: CANCELLED | OUTPATIENT
Start: 2022-09-20

## 2022-09-15 RX ORDER — SODIUM CHLORIDE 0.9 % (FLUSH) 0.9 %
5-40 SYRINGE (ML) INJECTION EVERY 12 HOURS SCHEDULED
Status: CANCELLED | OUTPATIENT
Start: 2022-09-20

## 2022-09-15 ASSESSMENT — ENCOUNTER SYMPTOMS
ABDOMINAL PAIN: 0
CHEST TIGHTNESS: 0
SINUS PAIN: 0
RHINORRHEA: 0
TROUBLE SWALLOWING: 0
BLOOD IN STOOL: 0
CONSTIPATION: 0
NAUSEA: 0
EYE PAIN: 0
PHOTOPHOBIA: 0
EYE DISCHARGE: 0
VOICE CHANGE: 1
EYE ITCHING: 0
SHORTNESS OF BREATH: 0
CHOKING: 0
VOMITING: 0
DIARRHEA: 0
EYE REDNESS: 0
WHEEZING: 0
SORE THROAT: 0
SINUS PRESSURE: 0
COUGH: 0
ABDOMINAL DISTENTION: 0
BACK PAIN: 1

## 2022-09-15 NOTE — H&P
Preoperative Consultation      Name: Liliya Hicks  YOB: 1970  Date of Service: 9/15/2022      CHIEF COMPLAINT:  laryngeal nodule    HISTORY OF PRESENT ILLNESS:      The patient is a 45 y.o. male with significant past medical history of laryngeal nodule who presents for a preoperative consultation at the request of surgeon, Dr. Swapna Brooks, who plans on performing suspension microlaryngoscopy with biopsy on 9/20/22 at [facility]. 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 Pt reports he is followed by oncology at [facility]. Pt reports he had a total thyroidectomy in 2021. Pt follows with endocrinology (Dr. Sherry Way). 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 He is followed by ENT at [facility] (Dr. Ritu Calix). Pt reports he had vocal cord injection in March 2022 but states it did not help. Pt reports he had a MRI of his neck on 9/2/22 that showed a new mass. Pt was referred to Dr. Swapna Brooks for biopsy. Pt reports he has neck pain that is a 3-4/10 on average, he reports that when it flares up it is usually a 7/10. Pt describes the pain as \"achy/stabbing/and sometimes constrictive\". Pt rates pain today at 3/10. Pt denies taking any medications for pain currently. He reports he stopped taking Motrin as directed by Dr. Swapna Brooks prior to surgery. Pt reports he only takes medication for pain when it is a 7/10 or above.      Planned Anesthesia: General  Known Anesthesia Problems: Pt reports severe n/v with cholecystectomy in 2010  Bleeding Risk: No recent or remote history of abnormal bleeding, pt reports he stopped taking motrin and multivitamins per  prior to OR   Patient Objection to Receiving Blood Products: No  Personal of FH of DVT/PE: No    Medical/Cardiac Clearance Needed: No    Past Medical History:        Diagnosis Date    Acquired hypothyroidism 12/20/2017    meds > 10 yrs    Anxiety 2008    Benign non-nodular prostatic hyperplasia without lower urinary tract symptoms 03/03/2016    Chronic bilateral low back pain 03/29/2019    History of 2019 novel coronavirus disease (COVID-19) 09/10/2021    Leukopenia 07/24/2012    Papillary carcinoma of thyroid [address] [address] 02/24/2022    PONV (postoperative nausea and vomiting)     severe n/v with choli     Past Surgical History:    Past Surgical History:   Procedure Laterality Date    CHOLECYSTECTOMY, LAPAROSCOPIC  2010    COLONOSCOPY  06/20/2018    int hemorr, 10y repeat (DR SCHERER)    CT NEEDLE BIOPSY LUNG PERCUTANEOUS Right 01/25/2022    performed by Dr. Starr Ruiz  01/25/2022    CT NEEDLE BIOPSY LUNG PERCUTANEOUS 1/25/2022 [facility] CT SCAN    ENDOSCOPY, COLON, DIAGNOSTIC      LYMPH NODE BIOPSY Right 03/01/2022    EXCISION OF RIGHT AXILLARY MASS performed by Ale Omalley MD at [address] Right 11/23/2021    Deep jugular node dissection with removal of right jovanny mass conglomerate (DR CERVANTES)    NECK SURGERY Right 11/23/2021    REMOVAL OF RIGHT NECK MASS performed by Ale Omalley MD at [facility]  12/2021    UPPER GASTROINTESTINAL ENDOSCOPY  2009    gastritis    VASECTOMY  2007    VOCAL CORD SURGERY Right 03/01/2022    RIGHT NIGEL VOCAL CORD INJECTION. performed by Ale Omalley MD at [facility]       Allergies: Iv dye [iodides], Diatrizoate, Iodine, and Codeine    *Pt notes HIGH (Anaphylaxis) allergy to IV Dye    Medications Prior to Admission:    Current Outpatient Medications   Medication Sig Dispense Refill    ibuprofen (ADVIL;MOTRIN) 600 MG tablet Take 600 mg by mouth every 6 hours as needed for Pain      levothyroxine (SYNTHROID) 175 MCG tablet Take by mouth Daily      Probiotic Product (ACIDOPHILUS) CHEW Take by mouth      traMADol (ULTRAM) 50 MG tablet Take 50 mg by mouth as needed. Multiple Vitamins-Minerals (THERAPEUTIC MULTIVITAMIN-MINERALS) tablet Take 1 tablet by mouth daily        No current facility-administered medications for this encounter. 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   Tobacco Use    Smoking status: Former     Packs/day: 0.30     Years: 5.00     Pack years: 1.50     Types: Cigarettes     Start date:      Quit date: 2003     Years since quittin.7    Smokeless tobacco: Never   Vaping Use    Vaping Use: Never used   Substance and Sexual Activity    Alcohol use: Yes     Comment: social    Drug use: No    Sexual activity: Yes     Partners: Female     Comment: monogamous   Other Topics Concern    Not on file   Social History Narrative    Lives with wife and children        1 dog        Hobbies: reading, outdoor work     Social Determinants of Health     Financial Resource Strain: Not on file   Food Insecurity: Not on file   Transportation Needs: Not on file   Physical Activity: Not on file   Stress: Not on file   Social Connections: Not on file   Intimate Partner Violence: Not on file   Housing Stability: Not on file       Family History:       Problem Relation Age of Onset    Diabetes Mother     Anemia Mother     High Blood Pressure Mother     Kidney Disease Mother     Arthritis Mother     Asthma Mother     Cervical Cancer Mother 43    Heart Failure Mother     High Cholesterol Mother     High Blood Pressure Father     Diabetes Father     Cirrhosis Father         Hep C    Diabetes type 2  Brother     No Known Problems Brother     Mental Retardation Maternal Uncle     No Known Problems Maternal Grandmother     No Known Problems Maternal Grandfather     No Known Problems Paternal Grandmother     No Known Problems Paternal Grandfather     No Known Problems Daughter     No Known Problems Son        Review of Systems   Constitutional:  Negative for appetite change, chills, diaphoresis, fatigue, fever and unexpected weight change. HENT:  Positive for voice change (vocal cord palsy). Negative for congestion, ear discharge, ear pain, hearing loss, nosebleeds, postnasal drip, rhinorrhea, sinus pressure, sinus pain, sneezing, sore throat, tinnitus and trouble swallowing. Eyes:  Negative for photophobia, pain, discharge, redness, itching and visual disturbance. Respiratory:  Negative for cough, choking, chest tightness, shortness of breath and wheezing. Cardiovascular:  Negative for chest pain, palpitations and leg swelling. Gastrointestinal:  Negative for abdominal distention, abdominal pain, blood in stool, constipation, diarrhea, nausea and vomiting. Endocrine: Negative for cold intolerance and heat intolerance. Genitourinary:  Negative for decreased urine volume, difficulty urinating, dysuria, frequency, hematuria and urgency. Musculoskeletal:  Positive for back pain (chronic back pain), neck pain and neck stiffness. Negative for gait problem, joint swelling and myalgias. Pt reports his neck pain is a 3-4/10 on average, he reports that when it flares up it is usually a 7/10. Pt describes the pain as \"achy/stabbing/and sometimes constrictive\". Pt rates pain today at 3/10. Pt denies taking any medications for pain currently. He reports he stopped taking Motrin as directed by Dr. Camille Ivy prior to surgery. Pt reports he only takes medication for pain when it is a 7/10 or above. Skin:  Negative for rash and wound. Neurological:  Positive for numbness (neck from dissection). Negative for dizziness, seizures, weakness, light-headedness and headaches. Hematological: Negative. Psychiatric/Behavioral: Negative.        Vitals:  /72   Pulse 64   Temp 97.9 °F (36.6 °C) (Temporal)   Resp 16   Ht 6' (1.829 m)   Wt 193 lb 6.4 oz (87.7 kg)   SpO2 99%   BMI 26.23 kg/m²       Physical Exam  Constitutional:       General: He is not in acute distress. Appearance: Normal appearance. He is not ill-appearing or toxic-appearing. HENT:      Head: Normocephalic. Right Ear: Tympanic membrane, ear canal and external ear normal. There is no impacted cerumen. Left Ear: Tympanic membrane, ear canal and external ear normal. There is no impacted cerumen. Nose: Nose normal. No congestion or rhinorrhea. Mouth/Throat:      Mouth: Mucous membranes are moist.      Pharynx: Oropharynx is clear. No oropharyngeal exudate or posterior oropharyngeal erythema. Eyes:      General: No scleral icterus. Right eye: No discharge. Left eye: No discharge. Extraocular Movements: Extraocular movements intact. Conjunctiva/sclera: Conjunctivae normal.      Pupils: Pupils are equal, round, and reactive to light. Neck:      Vascular: No carotid bruit. Comments: Scar present from prior neck surgery-healed well  Cardiovascular:      Rate and Rhythm: Normal rate and regular rhythm. Pulses: Normal pulses. Heart sounds: Normal heart sounds. No murmur heard. Pulmonary:      Effort: Pulmonary effort is normal. No respiratory distress. Breath sounds: Normal breath sounds. No wheezing. Abdominal:      General: Bowel sounds are normal. There is no distension. Palpations: Abdomen is soft. Tenderness: There is no abdominal tenderness. There is no guarding. Genitourinary:     Comments: Deferred  Musculoskeletal:         General: No swelling, deformity or signs of injury. Normal range of motion. Cervical back: Normal range of motion. No rigidity. Right lower leg: No edema. Left lower leg: No edema. Lymphadenopathy:      Cervical: No cervical adenopathy. Skin:     General: Skin is warm and dry. Capillary Refill: Capillary refill takes less than 2 seconds. Coloration: Skin is not jaundiced. Findings: No bruising, erythema, lesion or rash.    Neurological:      General: No focal deficit present. Mental Status: He is alert and oriented to person, place, and time. Motor: No weakness. Gait: Gait normal.   Psychiatric:         Mood and Affect: Mood normal.         Behavior: Behavior normal.         Thought Content: Thought content normal.         Judgment: Judgment normal.       Assessment:  45 y.o. patient with   Patient Active Problem List   Diagnosis    Benign non-nodular prostatic hyperplasia without lower urinary tract symptoms    Acquired hypothyroidism    Chronic bilateral low back pain    Hypertriglyceridemia    History of 2019 novel coronavirus disease (COVID-19)    Mass of right side of neck    Papillary carcinoma of thyroid (HCC)    Vocal cord palsy    Loose stools    Laryngeal nodule      with planned surgery as above.     Plan:  Preoperative workup as follows: CBC, EKG  2.   Scheduled for: suspension microlaryngoscopy with biopsy on 9/20/22    YUE Olmos - CNP  9/15/2022  3:32 PM

## 2022-09-16 LAB — THYROGLOBULIN BY LC-MS/MS, SERUM/PLASMA: <0.5 NG/ML (ref 1.3–31.8)

## 2022-09-20 ENCOUNTER — ANESTHESIA (OUTPATIENT)
Dept: OPERATING ROOM | Age: 52
End: 2022-09-20
Payer: COMMERCIAL

## 2022-09-20 ENCOUNTER — ANESTHESIA EVENT (OUTPATIENT)
Dept: OPERATING ROOM | Age: 52
End: 2022-09-20
Payer: COMMERCIAL

## 2022-09-20 ENCOUNTER — HOSPITAL ENCOUNTER (OUTPATIENT)
Age: 52
Setting detail: OUTPATIENT SURGERY
Discharge: HOME | End: 2022-09-20
Attending: OTOLARYNGOLOGY | Admitting: OTOLARYNGOLOGY
Payer: COMMERCIAL

## 2022-09-20 VITALS
BODY MASS INDEX: 26.14 KG/M2 | RESPIRATION RATE: 18 BRPM | TEMPERATURE: 97.8 F | OXYGEN SATURATION: 98 % | HEIGHT: 72 IN | WEIGHT: 193 LBS | SYSTOLIC BLOOD PRESSURE: 146 MMHG | HEART RATE: 84 BPM | DIASTOLIC BLOOD PRESSURE: 72 MMHG

## 2022-09-20 PROCEDURE — 7100000011 HC PHASE II RECOVERY - ADDTL 15 MIN: Performed by: OTOLARYNGOLOGY

## 2022-09-20 PROCEDURE — A4217 STERILE WATER/SALINE, 500 ML: HCPCS | Performed by: OTOLARYNGOLOGY

## 2022-09-20 PROCEDURE — 7100000000 HC PACU RECOVERY - FIRST 15 MIN: Performed by: OTOLARYNGOLOGY

## 2022-09-20 PROCEDURE — 2500000003 HC RX 250 WO HCPCS: Performed by: ANESTHESIOLOGY

## 2022-09-20 PROCEDURE — 2580000003 HC RX 258: Performed by: ANESTHESIOLOGY

## 2022-09-20 PROCEDURE — 7100000010 HC PHASE II RECOVERY - FIRST 15 MIN: Performed by: OTOLARYNGOLOGY

## 2022-09-20 PROCEDURE — 6370000000 HC RX 637 (ALT 250 FOR IP): Performed by: STUDENT IN AN ORGANIZED HEALTH CARE EDUCATION/TRAINING PROGRAM

## 2022-09-20 PROCEDURE — 3600000004 HC SURGERY LEVEL 4 BASE: Performed by: OTOLARYNGOLOGY

## 2022-09-20 PROCEDURE — 3700000001 HC ADD 15 MINUTES (ANESTHESIA): Performed by: OTOLARYNGOLOGY

## 2022-09-20 PROCEDURE — 2580000003 HC RX 258: Performed by: OTOLARYNGOLOGY

## 2022-09-20 PROCEDURE — 2709999900 HC NON-CHARGEABLE SUPPLY: Performed by: OTOLARYNGOLOGY

## 2022-09-20 PROCEDURE — 3600000014 HC SURGERY LEVEL 4 ADDTL 15MIN: Performed by: OTOLARYNGOLOGY

## 2022-09-20 PROCEDURE — 6360000002 HC RX W HCPCS: Performed by: ANESTHESIOLOGY

## 2022-09-20 PROCEDURE — 7100000001 HC PACU RECOVERY - ADDTL 15 MIN: Performed by: OTOLARYNGOLOGY

## 2022-09-20 PROCEDURE — 3700000000 HC ANESTHESIA ATTENDED CARE: Performed by: OTOLARYNGOLOGY

## 2022-09-20 PROCEDURE — 6370000000 HC RX 637 (ALT 250 FOR IP): Performed by: ANESTHESIOLOGY

## 2022-09-20 RX ORDER — SODIUM CHLORIDE 0.9 % (FLUSH) 0.9 %
5-40 SYRINGE (ML) INJECTION EVERY 12 HOURS SCHEDULED
Status: DISCONTINUED | OUTPATIENT
Start: 2022-09-20 | End: 2022-09-20 | Stop reason: HOSPADM

## 2022-09-20 RX ORDER — MIDAZOLAM HYDROCHLORIDE 1 MG/ML
INJECTION INTRAMUSCULAR; INTRAVENOUS PRN
Status: DISCONTINUED | OUTPATIENT
Start: 2022-09-20 | End: 2022-09-20 | Stop reason: SDUPTHER

## 2022-09-20 RX ORDER — LIDOCAINE HYDROCHLORIDE 20 MG/ML
INJECTION, SOLUTION INTRAVENOUS PRN
Status: DISCONTINUED | OUTPATIENT
Start: 2022-09-20 | End: 2022-09-20 | Stop reason: SDUPTHER

## 2022-09-20 RX ORDER — FENTANYL CITRATE 50 UG/ML
INJECTION, SOLUTION INTRAMUSCULAR; INTRAVENOUS PRN
Status: DISCONTINUED | OUTPATIENT
Start: 2022-09-20 | End: 2022-09-20 | Stop reason: SDUPTHER

## 2022-09-20 RX ORDER — ONDANSETRON 2 MG/ML
INJECTION INTRAMUSCULAR; INTRAVENOUS PRN
Status: DISCONTINUED | OUTPATIENT
Start: 2022-09-20 | End: 2022-09-20 | Stop reason: SDUPTHER

## 2022-09-20 RX ORDER — SODIUM CHLORIDE, SODIUM LACTATE, POTASSIUM CHLORIDE, CALCIUM CHLORIDE 600; 310; 30; 20 MG/100ML; MG/100ML; MG/100ML; MG/100ML
INJECTION, SOLUTION INTRAVENOUS CONTINUOUS
Status: DISCONTINUED | OUTPATIENT
Start: 2022-09-20 | End: 2022-09-20 | Stop reason: HOSPADM

## 2022-09-20 RX ORDER — LIDOCAINE HYDROCHLORIDE 10 MG/ML
1 INJECTION, SOLUTION EPIDURAL; INFILTRATION; INTRACAUDAL; PERINEURAL
Status: DISCONTINUED | OUTPATIENT
Start: 2022-09-20 | End: 2022-09-20 | Stop reason: HOSPADM

## 2022-09-20 RX ORDER — SODIUM CHLORIDE 9 MG/ML
25 INJECTION, SOLUTION INTRAVENOUS PRN
Status: DISCONTINUED | OUTPATIENT
Start: 2022-09-20 | End: 2022-09-20 | Stop reason: HOSPADM

## 2022-09-20 RX ORDER — PROPOFOL 10 MG/ML
INJECTION, EMULSION INTRAVENOUS PRN
Status: DISCONTINUED | OUTPATIENT
Start: 2022-09-20 | End: 2022-09-20 | Stop reason: SDUPTHER

## 2022-09-20 RX ORDER — SODIUM CHLORIDE 0.9 % (FLUSH) 0.9 %
5-40 SYRINGE (ML) INJECTION PRN
Status: DISCONTINUED | OUTPATIENT
Start: 2022-09-20 | End: 2022-09-20 | Stop reason: HOSPADM

## 2022-09-20 RX ORDER — OXYCODONE HYDROCHLORIDE 5 MG/1
5 TABLET ORAL PRN
Status: DISCONTINUED | OUTPATIENT
Start: 2022-09-20 | End: 2022-09-20 | Stop reason: HOSPADM

## 2022-09-20 RX ORDER — SODIUM CHLORIDE, SODIUM LACTATE, POTASSIUM CHLORIDE, CALCIUM CHLORIDE 600; 310; 30; 20 MG/100ML; MG/100ML; MG/100ML; MG/100ML
INJECTION, SOLUTION INTRAVENOUS CONTINUOUS PRN
Status: DISCONTINUED | OUTPATIENT
Start: 2022-09-20 | End: 2022-09-20 | Stop reason: SDUPTHER

## 2022-09-20 RX ORDER — ROCURONIUM BROMIDE 10 MG/ML
INJECTION, SOLUTION INTRAVENOUS PRN
Status: DISCONTINUED | OUTPATIENT
Start: 2022-09-20 | End: 2022-09-20 | Stop reason: SDUPTHER

## 2022-09-20 RX ORDER — FENTANYL CITRATE 50 UG/ML
50 INJECTION, SOLUTION INTRAMUSCULAR; INTRAVENOUS EVERY 10 MIN PRN
Status: DISCONTINUED | OUTPATIENT
Start: 2022-09-20 | End: 2022-09-20 | Stop reason: HOSPADM

## 2022-09-20 RX ORDER — OXYCODONE HYDROCHLORIDE 5 MG/1
10 TABLET ORAL PRN
Status: DISCONTINUED | OUTPATIENT
Start: 2022-09-20 | End: 2022-09-20 | Stop reason: HOSPADM

## 2022-09-20 RX ORDER — DIPHENHYDRAMINE HYDROCHLORIDE 50 MG/ML
12.5 INJECTION INTRAMUSCULAR; INTRAVENOUS
Status: DISCONTINUED | OUTPATIENT
Start: 2022-09-20 | End: 2022-09-20 | Stop reason: HOSPADM

## 2022-09-20 RX ORDER — ONDANSETRON 2 MG/ML
4 INJECTION INTRAMUSCULAR; INTRAVENOUS
Status: COMPLETED | OUTPATIENT
Start: 2022-09-20 | End: 2022-09-20

## 2022-09-20 RX ORDER — MEPERIDINE HYDROCHLORIDE 25 MG/ML
12.5 INJECTION INTRAMUSCULAR; INTRAVENOUS; SUBCUTANEOUS
Status: DISCONTINUED | OUTPATIENT
Start: 2022-09-20 | End: 2022-09-20 | Stop reason: HOSPADM

## 2022-09-20 RX ORDER — SODIUM CHLORIDE 9 MG/ML
INJECTION, SOLUTION INTRAVENOUS PRN
Status: DISCONTINUED | OUTPATIENT
Start: 2022-09-20 | End: 2022-09-20 | Stop reason: HOSPADM

## 2022-09-20 RX ORDER — ACETAMINOPHEN 500 MG
1000 TABLET ORAL ONCE
Status: COMPLETED | OUTPATIENT
Start: 2022-09-20 | End: 2022-09-20

## 2022-09-20 RX ORDER — DEXAMETHASONE SODIUM PHOSPHATE 10 MG/ML
INJECTION INTRAMUSCULAR; INTRAVENOUS PRN
Status: DISCONTINUED | OUTPATIENT
Start: 2022-09-20 | End: 2022-09-20 | Stop reason: SDUPTHER

## 2022-09-20 RX ORDER — SUCCINYLCHOLINE/SOD CL,ISO/PF 100 MG/5ML
SYRINGE (ML) INTRAVENOUS PRN
Status: DISCONTINUED | OUTPATIENT
Start: 2022-09-20 | End: 2022-09-20 | Stop reason: SDUPTHER

## 2022-09-20 RX ORDER — MAGNESIUM HYDROXIDE 1200 MG/15ML
LIQUID ORAL CONTINUOUS PRN
Status: COMPLETED | OUTPATIENT
Start: 2022-09-20 | End: 2022-09-20

## 2022-09-20 RX ORDER — SCOLOPAMINE TRANSDERMAL SYSTEM 1 MG/1
1 PATCH, EXTENDED RELEASE TRANSDERMAL ONCE
Status: DISCONTINUED | OUTPATIENT
Start: 2022-09-20 | End: 2022-09-20 | Stop reason: HOSPADM

## 2022-09-20 RX ORDER — METOCLOPRAMIDE HYDROCHLORIDE 5 MG/ML
10 INJECTION INTRAMUSCULAR; INTRAVENOUS
Status: COMPLETED | OUTPATIENT
Start: 2022-09-20 | End: 2022-09-20

## 2022-09-20 RX ADMIN — METOCLOPRAMIDE 10 MG: 5 INJECTION, SOLUTION INTRAMUSCULAR; INTRAVENOUS at 11:53

## 2022-09-20 RX ADMIN — ONDANSETRON 4 MG: 2 INJECTION INTRAMUSCULAR; INTRAVENOUS at 10:43

## 2022-09-20 RX ADMIN — DEXAMETHASONE SODIUM PHOSPHATE 12 MG: 10 INJECTION INTRAMUSCULAR; INTRAVENOUS at 10:23

## 2022-09-20 RX ADMIN — ONDANSETRON 4 MG: 2 INJECTION INTRAMUSCULAR; INTRAVENOUS at 11:28

## 2022-09-20 RX ADMIN — SODIUM CHLORIDE, POTASSIUM CHLORIDE, SODIUM LACTATE AND CALCIUM CHLORIDE: 600; 310; 30; 20 INJECTION, SOLUTION INTRAVENOUS at 10:17

## 2022-09-20 RX ADMIN — ACETAMINOPHEN 1000 MG: 500 TABLET ORAL at 12:23

## 2022-09-20 RX ADMIN — FENTANYL CITRATE 25 MCG: 50 INJECTION, SOLUTION INTRAMUSCULAR; INTRAVENOUS at 10:29

## 2022-09-20 RX ADMIN — PROPOFOL 200 MG: 10 INJECTION, EMULSION INTRAVENOUS at 10:23

## 2022-09-20 RX ADMIN — MIDAZOLAM HYDROCHLORIDE 2 MG: 1 INJECTION, SOLUTION INTRAMUSCULAR; INTRAVENOUS at 10:14

## 2022-09-20 RX ADMIN — FENTANYL CITRATE 25 MCG: 50 INJECTION, SOLUTION INTRAMUSCULAR; INTRAVENOUS at 10:23

## 2022-09-20 RX ADMIN — Medication 100 MG: at 10:23

## 2022-09-20 RX ADMIN — LIDOCAINE HYDROCHLORIDE 100 MG: 20 INJECTION, SOLUTION INTRAVENOUS at 10:23

## 2022-09-20 RX ADMIN — SUGAMMADEX 200 MG: 100 INJECTION, SOLUTION INTRAVENOUS at 10:54

## 2022-09-20 RX ADMIN — ROCURONIUM BROMIDE 15 MG: 10 INJECTION INTRAVENOUS at 10:40

## 2022-09-20 ASSESSMENT — PAIN DESCRIPTION - LOCATION
LOCATION: NECK
LOCATION: THROAT
LOCATION: NECK

## 2022-09-20 ASSESSMENT — PAIN SCALES - GENERAL
PAINLEVEL_OUTOF10: 6

## 2022-09-20 ASSESSMENT — PAIN - FUNCTIONAL ASSESSMENT: PAIN_FUNCTIONAL_ASSESSMENT: 0-10

## 2022-09-20 NOTE — PROGRESS NOTES
Pt up to bedside commode. Pt is alert and oriented. Pt able to stand and pivot on his own. Pt sitting upright on bedside commode and complaining of nausea at this time. Pt belching and spit up a scant amount of thick sputum with blood noted.

## 2022-09-20 NOTE — DISCHARGE INSTRUCTIONS
Follow instructions given by Dr. Shirley Sinclair, call his office with any questions and for follow-up.

## 2022-09-20 NOTE — OP NOTE
[address]                      [address], [address] [facility]                                OPERATIVE REPORT    PATIENT NAME: Vita Rees                     :        1970  MED REC NO:   [id number]                            ROOM:  ACCOUNT NO:   [de-identified]                           ADMIT DATE: 2022  PROVIDER:     Hernando Solorzano MD    DATE OF PROCEDURE:  2022    DIAGNOSIS:  History of papillary thyroid carcinoma with evident internal  laryngeal mass. OPERATION PERFORMED:  Direct laryngoscopy/suspension microlaryngoscopy. SURGEON:  Hernando Solorzano MD    ANESTHESIA:  General.    INDICATIONS:  A 49-year-old male with significant history of papillary  thyroid carcinoma, quite aggressive in its nature with metastasis to  lung, etc.  Now noted to have evidence of a possible irregularity  involving the right hemilarynx and now taken to surgery for suspension  microlaryngoscopy and possible biopsy is warranted. OPERATIVE PROCEDURE:  The patient was taken to the operating room and  administered general anesthesia with appropriate prep, drape and  time-out performed. Thorough examination of the larynx was performed. The Dedo laryngoscope including utilizing the suspension arm as well as  the operating microscope. Thorough examination and palpation of all  tissues in this area of the right hemilarynx failed to reveal any  obvious mass or tumor. The only irregularity that truly apparent is  where the arytenoid tends to sit and that may relate very well to the  previous vocal cord paralysis secondary to tumor encasement of the  recurrent laryngeal nerve and status post previous resection. All  remaining laryngeal structures including the piriform sinus on the  right-hand side is grossly unremarkable without evidence of inherent or  intrinsic mass or tumor.   As such the operating microscope and  laryngoscope were removed and the patient released and taken to Recovery  in stable condition. Estimated blood loss minimal.  No complication. Darryl Tse MD    D: 09/20/2022 14:35:47       T: 09/20/2022 14:38:11     [id number]  Job#: [id number]     Doc#: [id number]    CC:   Eleonora Bowie MD

## 2022-09-20 NOTE — ANESTHESIA PRE PROCEDURE
Department of Anesthesiology  Preprocedure Note       Name:  Jaden Lomeli   Age:  45 y.o.  :  1970                                          MRN:  [id number]         Date:  2022      Surgeon: Alejandra Signs): Cinthia Lyman MD    Procedure: Procedure(s):  SUSPENSION MICROLARYNGOSCOPY WITH BIOPSY. 30 MIN    Medications prior to admission:   Prior to Admission medications    Medication Sig Start Date End Date Taking? Authorizing Provider   ibuprofen (ADVIL;MOTRIN) 600 MG tablet Take 600 mg by mouth every 6 hours as needed for Pain    Historical Provider, MD   levothyroxine (SYNTHROID) 175 MCG tablet Take 175 mcg by mouth Daily 22   Historical Provider, MD   Probiotic Product (ACIDOPHILUS) CHEW Take by mouth    Historical Provider, MD   traMADol (ULTRAM) 50 MG tablet Take 50 mg by mouth as needed. 3/18/22   Historical Provider, MD   Multiple Vitamins-Minerals (THERAPEUTIC MULTIVITAMIN-MINERALS) tablet Take 1 tablet by mouth daily     Historical Provider, MD       Current medications:    Current Facility-Administered Medications   Medication Dose Route Frequency Provider Last Rate Last Admin    lactated ringers infusion   IntraVENous Continuous Samantha Duque MD        lidocaine PF 1 % injection 1 mL  1 mL IntraDERmal Once PRN Lowella Leandra, APRN - CNP        lactated ringers infusion   IntraVENous Continuous Lowella Leandra, APRN - CNP        sodium chloride flush 0.9 % injection 5-40 mL  5-40 mL IntraVENous 2 times per day Lowella Leandra, APRN - CNP        sodium chloride flush 0.9 % injection 5-40 mL  5-40 mL IntraVENous PRN Lowella Leandra, APRN - CNP        0.9 % sodium chloride infusion   IntraVENous PRN Lowella Leandra, APRN - CNP        scopolamine (TRANSDERM-SCOP) transdermal patch 1 patch  1 patch TransDERmal Once Vikki Rm MD           Allergies:     Allergies   Allergen Reactions    Iv Dye [Iodides] Anaphylaxis    Diatrizoate      Other reaction(s): Unknown    Iodine Other (See Comments)    Codeine Nausea And Vomiting       Problem List:    Patient Active Problem List   Diagnosis Code    Benign non-nodular prostatic hyperplasia without lower urinary tract symptoms N40.0    Acquired hypothyroidism E03.9    Chronic bilateral low back pain M54.50, G89.29    Hypertriglyceridemia E78.1    History of 2019 novel coronavirus disease (COVID-19) Z86.16    Mass of right side of neck R22.1    Papillary carcinoma of thyroid ([address] C73    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    Loose stools R19.5    Laryngeal nodule J38.7       Past Medical History:        Diagnosis Date    Acquired hypothyroidism 12/20/2017    meds > 10 yrs    Anxiety 2008    Benign non-nodular prostatic hyperplasia without lower urinary tract symptoms 03/03/2016    Chronic bilateral low back pain 03/29/2019    History of 2019 novel coronavirus disease (COVID-19) 09/10/2021    Leukopenia 07/24/2012    Papillary carcinoma of thyroid ([facility] 02/24/2022    PONV (postoperative nausea and vomiting)     severe n/v with choli       Past Surgical History:        Procedure Laterality Date    CHOLECYSTECTOMY, LAPAROSCOPIC  2010    COLONOSCOPY  06/20/2018    int hemorr, 10y repeat (DR SCHERER)    CT NEEDLE BIOPSY LUNG PERCUTANEOUS Right 01/25/2022    performed by Dr. Eli Veloz  01/25/2022    CT NEEDLE BIOPSY LUNG PERCUTANEOUS 1/25/2022 [facility] CT SCAN    ENDOSCOPY, COLON, DIAGNOSTIC      LYMPH NODE BIOPSY Right 03/01/2022    EXCISION OF RIGHT AXILLARY MASS performed by Rach Montes MD at [address] Right 11/23/2021    Deep jugular node dissection with removal of right jovanny mass conglomerate (DR CERVANTES)    NECK SURGERY Right 11/23/2021    REMOVAL OF RIGHT NECK MASS performed by Rach Montes MD at [address] [place] [address]  12/2021    UPPER GASTROINTESTINAL ENDOSCOPY  2009    gastritis    VASECTOMY  2007    VOCAL CORD SURGERY Right 03/01/2022    RIGHT NIGEL VOCAL CORD INJECTION.  performed by Rach Montes MD at 09:52 AM    ALKPHOS 69 05/05/2022 09:52 AM    AST 24 05/05/2022 09:52 AM    ALT 16 05/05/2022 09:52 AM       POC Tests: No results for input(s): POCGLU, POCNA, POCK, POCCL, POCBUN, POCHEMO, POCHCT in the last 72 hours. Coags:   Lab Results   Component Value Date/Time    PROTIME 13.0 01/24/2022 03:09 PM    INR 1.0 01/24/2022 03:09 PM       HCG (If Applicable): No results found for: PREGTESTUR, PREGSERUM, HCG, HCGQUANT     ABGs: No results found for: PHART, PO2ART, VUD5JXX, PAQ9FKO, BEART, O8NMPRDZ     Type & Screen (If Applicable):  No results found for: LABABO, LABRH    Drug/Infectious Status (If Applicable):  No results found for: HIV, HEPCAB    COVID-19 Screening (If Applicable):   Lab Results   Component Value Date/Time    COVID19 Not Detected 12/27/2021 04:45 PM           Anesthesia Evaluation     history of anesthetic complications: PONV. Airway: Mallampati: II  TM distance: >3 FB   Neck ROM: full  Mouth opening: > = 3 FB   Dental:          Pulmonary:Negative Pulmonary ROS breath sounds clear to auscultation                             Cardiovascular:Negative CV ROS          ECG reviewed  Rhythm: regular                      Neuro/Psych:   Negative Neuro/Psych ROS              GI/Hepatic/Renal: Neg GI/Hepatic/Renal ROS            Endo/Other:    (+) hypothyroidism::., malignancy/cancer. Abdominal:             Vascular: negative vascular ROS. Other Findings:           Anesthesia Plan      general     ASA 2     (PONV prophylaxis; scop patch)  Induction: intravenous. MIPS: Postoperative opioids intended and Prophylactic antiemetics administered. Anesthetic plan and risks discussed with patient.       Plan discussed with surgical team.    Attending anesthesiologist reviewed and agrees with Preprocedure content                Dipak Vogel MD   9/20/2022

## 2022-09-20 NOTE — BRIEF OP NOTE
Brief Postoperative Note      Patient: Gina Gee  YOB: 1970  MRN: [id number]    Date of Procedure: 9/20/2022    Pre-Op Diagnosis: Thyroid Ca with possible laryngeal mass    Post-Op Diagnosis: Same       Procedure(s):  SUSPENSION MICROLARYNGOSCOPY    Surgeon(s):   Arden Guzman MD    Assistant:  * No surgical staff found *    Anesthesia: General    Estimated Blood Loss (mL): Minimal    Complications: None    Specimens:   * No specimens in log *    Implants:  * No implants in log *      Drains: * No LDAs found *    Findings: no intrinsic mass seen    Electronically signed by Arden Guzman MD on 9/20/2022 at 11:30 AM

## 2022-11-08 ENCOUNTER — HOSPITAL ENCOUNTER (OUTPATIENT)
Dept: LAB | Age: 52
Discharge: HOME | End: 2022-11-08
Payer: COMMERCIAL

## 2022-11-08 LAB — TSH SERPL DL<=0.05 MIU/L-ACNC: 0.29 UIU/ML (ref 0.44–3.86)

## 2022-11-08 PROCEDURE — 84432 ASSAY OF THYROGLOBULIN: CPT

## 2022-11-08 PROCEDURE — 86800 THYROGLOBULIN ANTIBODY: CPT

## 2022-11-08 PROCEDURE — 84443 ASSAY THYROID STIM HORMONE: CPT

## 2022-11-08 PROCEDURE — 36415 COLL VENOUS BLD VENIPUNCTURE: CPT

## 2022-11-09 ENCOUNTER — OFFICE VISIT (OUTPATIENT)
Dept: FAMILY MEDICINE CLINIC | Age: 52
End: 2022-11-09
Payer: COMMERCIAL

## 2022-11-09 VITALS
OXYGEN SATURATION: 98 % | SYSTOLIC BLOOD PRESSURE: 130 MMHG | HEART RATE: 75 BPM | BODY MASS INDEX: 25.73 KG/M2 | DIASTOLIC BLOOD PRESSURE: 88 MMHG | HEIGHT: 72 IN | TEMPERATURE: 97.2 F | WEIGHT: 190 LBS

## 2022-11-09 DIAGNOSIS — I10 PRIMARY HYPERTENSION: Primary | ICD-10-CM

## 2022-11-09 DIAGNOSIS — E78.1 HYPERTRIGLYCERIDEMIA: ICD-10-CM

## 2022-11-09 DIAGNOSIS — Z12.5 SCREENING FOR PROSTATE CANCER: ICD-10-CM

## 2022-11-09 DIAGNOSIS — Z13.1 SCREENING FOR DIABETES MELLITUS: ICD-10-CM

## 2022-11-09 PROBLEM — H34.8112 CENTRAL RETINAL VEIN OCCLUSION OF RIGHT EYE: Status: ACTIVE | Noted: 2022-11-09

## 2022-11-09 PROBLEM — H34.8112 CENTRAL RETINAL VEIN OCCLUSION OF RIGHT EYE: Status: RESOLVED | Noted: 2022-11-09 | Resolved: 2022-11-09

## 2022-11-09 PROBLEM — C77.0 SECONDARY AND UNSPECIFIED MALIGNANT NEOPLASM OF LYMPH NODES OF HEAD, FACE AND NECK (HCC): Status: ACTIVE | Noted: 2021-12-29

## 2022-11-09 PROBLEM — E83.52 HYPERCALCEMIA: Status: ACTIVE | Noted: 2022-01-02

## 2022-11-09 PROBLEM — C78.02 SECONDARY MALIGNANT NEOPLASM OF LEFT LUNG (HCC): Status: ACTIVE | Noted: 2022-01-02

## 2022-11-09 PROBLEM — J38.00 PARALYSIS OF VOCAL CORDS AND LARYNX, UNSPECIFIED: Status: ACTIVE | Noted: 2022-07-11

## 2022-11-09 PROBLEM — E83.52 HYPERCALCEMIA: Status: RESOLVED | Noted: 2022-01-02 | Resolved: 2022-11-09

## 2022-11-09 PROCEDURE — 3078F DIAST BP <80 MM HG: CPT | Performed by: FAMILY MEDICINE

## 2022-11-09 PROCEDURE — 99213 OFFICE O/P EST LOW 20 MIN: CPT | Performed by: FAMILY MEDICINE

## 2022-11-09 PROCEDURE — 3074F SYST BP LT 130 MM HG: CPT | Performed by: FAMILY MEDICINE

## 2022-11-09 RX ORDER — HYDROCHLOROTHIAZIDE 12.5 MG/1
12.5 CAPSULE, GELATIN COATED ORAL EVERY MORNING
Qty: 30 CAPSULE | Refills: 1 | Status: SHIPPED | OUTPATIENT
Start: 2022-11-09

## 2022-11-09 RX ORDER — LEVOTHYROXINE SODIUM 0.2 MG/1
TABLET ORAL
COMMUNITY
Start: 2022-09-15

## 2022-11-09 SDOH — ECONOMIC STABILITY: FOOD INSECURITY: WITHIN THE PAST 12 MONTHS, THE FOOD YOU BOUGHT JUST DIDN'T LAST AND YOU DIDN'T HAVE MONEY TO GET MORE.: NEVER TRUE

## 2022-11-09 SDOH — ECONOMIC STABILITY: FOOD INSECURITY: WITHIN THE PAST 12 MONTHS, YOU WORRIED THAT YOUR FOOD WOULD RUN OUT BEFORE YOU GOT MONEY TO BUY MORE.: NEVER TRUE

## 2022-11-09 ASSESSMENT — SOCIAL DETERMINANTS OF HEALTH (SDOH): HOW HARD IS IT FOR YOU TO PAY FOR THE VERY BASICS LIKE FOOD, HOUSING, MEDICAL CARE, AND HEATING?: NOT HARD AT ALL

## 2022-11-09 ASSESSMENT — ENCOUNTER SYMPTOMS
DIARRHEA: 0
VOMITING: 0
WHEEZING: 0
CHEST TIGHTNESS: 0
SHORTNESS OF BREATH: 0
ABDOMINAL PAIN: 0
NAUSEA: 0

## 2022-11-09 NOTE — ASSESSMENT & PLAN NOTE
Intermittent elevated blood pressure values recorded in patient with labile BP values at home. Secondary to labile BP and recent retinal vein occlusion we will more aggressively manage suspected underlying hypertension. I reviewed with patient medication choices including thiazide diuretics, ACE inhibitor, ARB, beta-blocker, or calcium channel blocker. We will start low-dose thiazide diuretic and keep close follow-up with nurse visit blood pressure check in 1 to 2 weeks and hypertension follow-up in 4 weeks to reassess.

## 2022-11-09 NOTE — PROGRESS NOTES
Elian Peña (: 1970) is a 45 y.o. male, Established patient, who presents today for:    Chief Complaint   Patient presents with    Hypertension     Patient states that his BP hs been elevated and has been affecting his eyesight          ASSESSMENT/PLAN    1. Primary hypertension  Assessment & Plan:  Intermittent elevated blood pressure values recorded in patient with labile BP values at home. Secondary to labile BP and recent retinal vein occlusion we will more aggressively manage suspected underlying hypertension. I reviewed with patient medication choices including thiazide diuretics, ACE inhibitor, ARB, beta-blocker, or calcium channel blocker. We will start low-dose thiazide diuretic and keep close follow-up with nurse visit blood pressure check in 1 to 2 weeks and hypertension follow-up in 4 weeks to reassess. Orders:  -     hydroCHLOROthiazide (MICROZIDE) 12.5 MG capsule; Take 1 capsule by mouth every morning, Disp-30 capsule, R-1Normal  -     Comprehensive Metabolic Panel; Future  2. Hypertriglyceridemia  -     Lipid Panel; Future  3. Screening for diabetes mellitus  -     Hemoglobin A1C; Future  4. Screening for prostate cancer  -     PSA Screening; Future    Return for Nurse visit BP/pulse check in 1-2 weeks, HTN F/U in 4 weeks. SUBJECTIVE/OBJECTIVE:    HPI    Patient presents for acute visit regarding concerns over elevated blood pressure. Patient reports labile BP values at home (062Z to 685U systolic) with intermittent headaches, intermittent lightheadedness, and episodes of dizziness. Patient denies any chest pain, dyspnea, palpitations, worsening lower extremity edema, or syncope. Patient reports continued exercise and no problems with activity tolerance. Patient reports recent retinal vein occlusion of right eye treated per Dr. Mandie Corcoran ([facility]). Recommendation was made to aggressively manage BP due to suspected underlying hypertension.        Current Outpatient Medications on File Prior to Visit   Medication Sig Dispense Refill    levothyroxine (SYNTHROID) 200 MCG tablet       ibuprofen (ADVIL;MOTRIN) 600 MG tablet Take 600 mg by mouth every 6 hours as needed for Pain      Probiotic Product (ACIDOPHILUS) CHEW Take by mouth      traMADol (ULTRAM) 50 MG tablet Take 50 mg by mouth as needed. Multiple Vitamins-Minerals (THERAPEUTIC MULTIVITAMIN-MINERALS) tablet Take 1 tablet by mouth daily        No current facility-administered medications on file prior to visit. Allergies   Allergen Reactions    Iv Dye [Iodides] Anaphylaxis    Diatrizoate      Other reaction(s): Unknown    Iodine Other (See Comments)    Codeine Nausea And Vomiting        Review of Systems   Constitutional:  Negative for appetite change, chills, diaphoresis, fatigue, fever and unexpected weight change. Eyes:  Negative for visual disturbance (resolved following management of retinal vein occlusion). Respiratory:  Negative for chest tightness, shortness of breath and wheezing. Cardiovascular:  Negative for chest pain, palpitations and leg swelling. No orthopnea, No PND   Gastrointestinal:  Negative for abdominal pain, diarrhea, nausea and vomiting. Neurological:  Positive for dizziness (intermittend, brief episodes), light-headedness (intermittent, mild) and headaches (intermittent, mild). Negative for syncope, weakness and numbness. Vitals:  /88 (Site: Right Upper Arm, Position: Sitting, Cuff Size: Medium Adult)   Pulse 75   Temp 97.2 °F (36.2 °C) (Temporal)   Ht 6' (1.829 m)   Wt 190 lb (86.2 kg) Comment: Patient reprted  SpO2 98%   BMI 25.77 kg/m²     Physical Exam  Vitals reviewed. Constitutional:       General: He is not in acute distress. Appearance: Normal appearance. Neck:      Vascular: No carotid bruit. Cardiovascular:      Rate and Rhythm: Normal rate and regular rhythm. Heart sounds: No murmur heard.   Pulmonary:      Effort: Pulmonary effort is normal. No respiratory distress. Breath sounds: Normal breath sounds. No wheezing, rhonchi or rales. Musculoskeletal:      Right lower leg: No edema. Left lower leg: No edema. Skin:     Findings: No rash. Neurological:      Mental Status: He is alert and oriented to person, place, and time. Psychiatric:         Mood and Affect: Mood normal.         Behavior: Behavior normal.         Thought Content: Thought content normal.       Ortho Exam (If Applicable)              An electronic signature was used to authenticate this note.      Kip Gan MD

## 2022-11-10 LAB — THYROGLOBULIN ANTIBODY: 3882 IU/ML (ref 0–40)

## 2022-11-15 LAB — THYROGLOBULIN BY LC-MS/MS, SERUM/PLASMA: <0.5 NG/ML (ref 1.3–31.8)

## 2022-12-07 ENCOUNTER — OFFICE VISIT (OUTPATIENT)
Dept: FAMILY MEDICINE CLINIC | Age: 52
End: 2022-12-07
Payer: COMMERCIAL

## 2022-12-07 VITALS
TEMPERATURE: 98.1 F | HEART RATE: 76 BPM | BODY MASS INDEX: 25.73 KG/M2 | SYSTOLIC BLOOD PRESSURE: 134 MMHG | DIASTOLIC BLOOD PRESSURE: 80 MMHG | HEIGHT: 72 IN | OXYGEN SATURATION: 96 % | WEIGHT: 190 LBS

## 2022-12-07 DIAGNOSIS — I10 PRIMARY HYPERTENSION: Primary | ICD-10-CM

## 2022-12-07 PROCEDURE — 3078F DIAST BP <80 MM HG: CPT | Performed by: FAMILY MEDICINE

## 2022-12-07 PROCEDURE — 99213 OFFICE O/P EST LOW 20 MIN: CPT | Performed by: FAMILY MEDICINE

## 2022-12-07 PROCEDURE — 3074F SYST BP LT 130 MM HG: CPT | Performed by: FAMILY MEDICINE

## 2022-12-07 RX ORDER — GABAPENTIN 300 MG/1
CAPSULE ORAL
COMMUNITY
Start: 2022-11-18

## 2022-12-07 RX ORDER — LOSARTAN POTASSIUM AND HYDROCHLOROTHIAZIDE 12.5; 5 MG/1; MG/1
1 TABLET ORAL DAILY
Qty: 30 TABLET | Refills: 1 | Status: SHIPPED | OUTPATIENT
Start: 2022-12-07

## 2022-12-07 ASSESSMENT — ENCOUNTER SYMPTOMS
SHORTNESS OF BREATH: 0
NAUSEA: 0
VOMITING: 0
CHEST TIGHTNESS: 0
WHEEZING: 0
ABDOMINAL PAIN: 0
DIARRHEA: 0

## 2022-12-07 ASSESSMENT — PATIENT HEALTH QUESTIONNAIRE - PHQ9
SUM OF ALL RESPONSES TO PHQ QUESTIONS 1-9: 0
SUM OF ALL RESPONSES TO PHQ QUESTIONS 1-9: 0
SUM OF ALL RESPONSES TO PHQ9 QUESTIONS 1 & 2: 0
1. LITTLE INTEREST OR PLEASURE IN DOING THINGS: 0
2. FEELING DOWN, DEPRESSED OR HOPELESS: 0
SUM OF ALL RESPONSES TO PHQ QUESTIONS 1-9: 0
SUM OF ALL RESPONSES TO PHQ QUESTIONS 1-9: 0

## 2022-12-07 NOTE — ASSESSMENT & PLAN NOTE
Based on noted elevated BP values at home we will add ARB to regimen combined with current diuretic. Patient will keep close follow-up in the next 4 weeks for reassessment. 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 Patient was instructed to increase dosing to 2 tablets daily and notify the office should BP values remain >671 systolic in 2 weeks.

## 2022-12-07 NOTE — PROGRESS NOTES
Eden Carey (: 1970) is a 45 y.o. male, Established patient, who presents today for:    Chief Complaint   Patient presents with    Hypertension     Patient is present for f/u Patient states that his bp at home is still running high. ASSESSMENT/PLAN    1. Primary hypertension  Assessment & Plan:  Based on noted elevated BP values at home we will add ARB to regimen combined with current diuretic. Patient will keep close follow-up in the next 4 weeks for reassessment. 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 Patient was instructed to increase dosing to 2 tablets daily and notify the office should BP values remain >347 systolic in 2 weeks. Orders:  -     losartan-hydroCHLOROthiazide (HYZAAR) 50-12.5 MG per tablet; Take 1 tablet by mouth daily, Disp-30 tablet, R-1Normal    Return for HTN F/U in 1 month. SUBJECTIVE/OBJECTIVE:    HPI    Patient presents for hypertension follow-up visit. They report elevated home BP values in the 719N to 955Y systolic since most recent visit despite use of HCTZ as prescribed. With elevated BP values there are reported episodes of H/A and dizziness, but patient denies any vision changes, chest pain, dyspnea, palpitations, lightheadedness, worsening lower extremity edema, or syncope. Current Outpatient Medications on File Prior to Visit   Medication Sig Dispense Refill    gabapentin (NEURONTIN) 300 MG capsule TAKE 1 CAPSULE BY MOUTH AT BEDTIME      levothyroxine (SYNTHROID) 200 MCG tablet       ibuprofen (ADVIL;MOTRIN) 600 MG tablet Take 600 mg by mouth every 6 hours as needed for Pain      Probiotic Product (ACIDOPHILUS) CHEW Take by mouth      traMADol (ULTRAM) 50 MG tablet Take 50 mg by mouth as needed. Multiple Vitamins-Minerals (THERAPEUTIC MULTIVITAMIN-MINERALS) tablet Take 1 tablet by mouth daily        No current facility-administered medications on file prior to visit.        Allergies   Allergen Reactions    Iv Dye [Iodides] Anaphylaxis    Diatrizoate      Other reaction(s): Unknown    Iodine Other (See Comments)    Codeine Nausea And Vomiting        Review of Systems   Constitutional:  Negative for appetite change, chills, diaphoresis, fatigue and fever. Respiratory:  Negative for chest tightness, shortness of breath and wheezing. Cardiovascular:  Negative for chest pain, palpitations and leg swelling. Gastrointestinal:  Negative for abdominal pain, diarrhea, nausea and vomiting. Neurological:  Positive for dizziness (with high BP) and headaches (with high BP). Negative for syncope, weakness, light-headedness and numbness. Vitals:  /80 (Site: Left Upper Arm, Position: Sitting, Cuff Size: Medium Adult)   Pulse 76   Temp 98.1 °F (36.7 °C) (Temporal)   Ht 6' (1.829 m)   Wt 190 lb (86.2 kg)   SpO2 96%   BMI 25.77 kg/m²     Physical Exam  Vitals reviewed. Constitutional:       General: He is not in acute distress. Appearance: Normal appearance. Neck:      Vascular: No carotid bruit. Cardiovascular:      Rate and Rhythm: Normal rate and regular rhythm. Heart sounds: No murmur heard. Pulmonary:      Effort: Pulmonary effort is normal. No respiratory distress. Breath sounds: Normal breath sounds. No wheezing, rhonchi or rales. Abdominal:      General: Bowel sounds are normal.      Palpations: Abdomen is soft. Tenderness: There is no abdominal tenderness. There is no guarding or rebound. Musculoskeletal:      Cervical back: Neck supple. Right lower leg: No edema. Left lower leg: No edema. Skin:     Findings: No rash. Neurological:      Mental Status: He is alert and oriented to person, place, and time. Psychiatric:         Mood and Affect: Mood normal.         Behavior: Behavior normal.         Thought Content: Thought content normal.       Ortho Exam (If Applicable)              An electronic signature was used to authenticate this note.      Massiel Montseinos, MD

## 2023-01-06 ENCOUNTER — PATIENT MESSAGE (OUTPATIENT)
Dept: FAMILY MEDICINE CLINIC | Age: 53
End: 2023-01-06

## 2023-01-06 DIAGNOSIS — I10 PRIMARY HYPERTENSION: ICD-10-CM

## 2023-01-06 RX ORDER — LOSARTAN POTASSIUM AND HYDROCHLOROTHIAZIDE 12.5; 5 MG/1; MG/1
1 TABLET ORAL DAILY
Qty: 90 TABLET | Refills: 1 | Status: SHIPPED | OUTPATIENT
Start: 2023-01-06

## 2023-01-06 NOTE — TELEPHONE ENCOUNTER
From: Brittney Layne  To: Dr. Kaykay Quan: 1/6/2023 11:24 AM EST  Subject: Medication     I have noticed improvement on BP for last two weeks. At times still a bit labile but I am hesitant to double dose since I feel it is low at times. The following are some readings. 11/26 = 145/90  12/6 = 148/85  12/9 = 129/85  12/14 = 123/74  1/3 = 128/82  1/5 = 134/80  Please let me know your directions as to dosage. I also need a three month prescription sent to [facility]  Thanks

## 2023-01-19 DIAGNOSIS — Z01.818 PRE-OP TESTING: Primary | ICD-10-CM

## 2023-01-20 ENCOUNTER — CARE COORDINATION (OUTPATIENT)
Dept: OTHER | Facility: CLINIC | Age: 53
End: 2023-01-20

## 2023-01-20 NOTE — CARE COORDINATION
Ambulatory Care Coordination Note  2023    ACC: Pipo Muller, RN    Associate Care Manager [facility]) contacted the patient by telephone to complete assessment and enrollment in the Associate Care Management Program. Verified name and  with  patient as identifiers. Provided introduction to self and explanation of the ACM role. Patient states he is not able to discuss issues at length as he does not have a voice a this time and difficult for him to talk at length. He states he will be going in for surgery at [facility] on next week and is anticipating to he Dc'd on 23. He states he dos have an OON waiver and his surgery approved through insurance for net week. He denies any immediate needs and is agreeable to follow up contact. Interventions:    Counseling and education provided at today's visit on:   Specialist appointment compliance  Utilization of appropriate level of care: Right Care, Right Place, Right Time    Medication reconciliation was performed with patient , who verbalizes understanding of administration of home medications. Advised obtaining a 90-day supply of all daily and as-needed medications. Reinforced resources available to patient including: PCP  Specialist  Benefits related nurse triage line  Urgent care clinics  MyChart Messaging. Advance Care Planning:   Does patient have an Advance Directive:  reviewed and current. Plan:  Continue weekly outreaches to provide telephonic support, education and resources as needed. Discuss / follow up on: Red Flag symptoms to report  Symptom management  Provider follow up appointment compliance  Routine/ Diagnostic testing compliance  Utilization of appropriate level of care  Discuss Plan of Care  Assess for Ongoing Needs    Patient verbalized understanding and is agreeable.       Ambulatory Care Coordination Assessment    Care Coordination Protocol  Referral from Primary Care Provider: No  Week 1 - Initial Assessment     Do you have all of your prescriptions and are they filled?: Yes  Barriers to medication adherence: None  Are you able to afford your medications?: Yes  How often do you have trouble taking your medications the way you have been told to take them?: I always take them as prescribed. Thinking about your patient's physical health needs, are there any symptoms or problems (risk indicators) you are unsure about that require further investigation?: No identified areas of uncertainly or problems already being investigated   Are the patients physical health problems impacting on their mental well-being?: Mild impact on mental well-being e.g. \"\"feeling fed-up\"\", \"\"reduced enjoyment\"\"   Are there any problems with your patients lifestyle behaviors (alcohol, drugs, diet, exercise) that are impacting on physical or mental well-being?: No identified areas of concern   Do you have any other concerns about your patients mental well-being? How would you rate their severity and impact on the patient?: No identified areas of concern   How would you rate their home environment in terms of safety and stability (including domestic violence, insecure housing, neighbor harassment)?: Consistently safe, supportive, stable, no identified problems   How would you rate their financial resources (including ability to afford all required medical care)?: Financially secure, some resource challenges   How wells does the patient now understand their health and well-being (symptoms, signs or risk factors) and what they need to do to manage their health?: Reasonable to good understanding and already engages in managing health or is willing to undertake better management   How well do you think your patient can engage in healthcare discussions? (Barriers include language, deafness, aphasia, alcohol or drug problems, learning difficulties, concentration):  Adequate communication, with or without minor barriers   Do other services need to be involved to help this patient?: Other care/services in place and adequate   Are current services involved with this patient well-coordinated? (Include coordination with other services you are now recommendation): Required care/services in place with some coordination barriers   Suggested Interventions and Community Resources   Other Services or Interventions: OON providers         Set up/Review Goals, Set up/Review an Education Plan              Prior to Admission medications    Medication Sig Start Date End Date Taking? Authorizing Provider   losartan-hydroCHLOROthiazide (HYZAAR) 50-12.5 MG per tablet Take 1 tablet by mouth daily 1/6/23   [place], MD   gabapentin (NEURONTIN) 300 MG capsule TAKE 1 CAPSULE BY MOUTH AT BEDTIME 11/18/22   Historical Provider, MD   levothyroxine (SYNTHROID) 200 MCG tablet  9/15/22   Historical Provider, MD   ibuprofen (ADVIL;MOTRIN) 600 MG tablet Take 600 mg by mouth every 6 hours as needed for Pain    Historical Provider, MD   Probiotic Product (ACIDOPHILUS) CHEW Take by mouth    Historical Provider, MD   traMADol (ULTRAM) 50 MG tablet Take 50 mg by mouth as needed. 3/18/22   Historical Provider, MD   Multiple Vitamins-Minerals (THERAPEUTIC MULTIVITAMIN-MINERALS) tablet Take 1 tablet by mouth daily     Historical Provider, MD       No future appointments.

## 2023-01-23 ENCOUNTER — HOSPITAL ENCOUNTER (OUTPATIENT)
Age: 53
Setting detail: SPECIMEN
Discharge: HOME | End: 2023-01-23
Payer: COMMERCIAL

## 2023-01-23 DIAGNOSIS — Z01.818 PRE-OP TESTING: ICD-10-CM

## 2023-01-23 PROCEDURE — 87635 SARS-COV-2 COVID-19 AMP PRB: CPT

## 2023-01-24 ENCOUNTER — TELEPHONE (OUTPATIENT)
Dept: FAMILY MEDICINE CLINIC | Age: 53
End: 2023-01-24

## 2023-01-24 LAB — SARS-COV-2, PCR: NOT DETECTED

## 2023-02-01 ENCOUNTER — CARE COORDINATION (OUTPATIENT)
Dept: OTHER | Facility: CLINIC | Age: 53
End: 2023-02-01

## 2023-02-01 RX ORDER — COVID-19 ANTIGEN TEST
KIT MISCELLANEOUS
COMMUNITY
Start: 2022-12-02

## 2023-02-01 RX ORDER — OXYCODONE HYDROCHLORIDE 5 MG/1
5 CAPSULE ORAL EVERY 4 HOURS PRN
COMMUNITY

## 2023-02-01 NOTE — CARE COORDINATION
[facility] Care Transitions Initial Follow Up Call    Call within 2 business days of discharge: No    Care Transition Nurse contacted the patient by telephone to perform post hospital discharge assessment. Verified name and  with patient as identifiers. Provided introduction to self, and explanation of the Care Transition Nurse role. Patient: Do Hernandez Patient : 1970   MRN: [id number]  Reason for Admission: Vocal cord paralysis  Discharge Date: 2023 RARS: No data recorded    Last Discharge  [address]       Date Complaint Diagnosis Description Type Department Provider    22   Admission (Discharged) Anila Gonzalez MD            Was this an external facility discharge? Yes, 2023  Discharge Facility: [address] [place] [address] to be reviewed by the provider   Additional needs identified to be addressed with provider: No  none           Method of communication with provider: chart routing. Assessment:  Patient reports that he is doing well. Communication is limited as patient's voice is still very weak sounding and it is difficult for his to talk at length. He states he is doing well, has all of his medications filled, and able to state when his follow up appts are. He denies any immediate ACM needs and is agreeable to follow up contact. Care Transition Nurse reviewed discharge instructions, medical action plan, and red flags with patient who verbalized understanding. The patient was given an opportunity to ask questions and does not have any further questions or concerns at this time. Were discharge instructions available to patient? Yes. Reviewed appropriate site of care based on symptoms and resources available to patient including: PCP  Specialist  Benefits related nurse triage line  Urgent care clinics  MyChart Messaging. The patient agrees to contact the PCP office for questions related to their healthcare.      Advance Care Planning:   Does patient have an Advance Directive: reviewed and current. Medication reconciliation was performed with patient, who verbalizes understanding of administration of home medications. Medications reviewed, 1111F entered: N/A      Non-face-to-face services provided:  Obtained and reviewed discharge summary and/or continuity of care documents      Care Transitions 24 Hour Call    Schedule Follow Up Appointment with PCP: Completed  Do you have a copy of your discharge instructions?: Yes  Do you have all of your prescriptions and are they filled?: Yes  Have you been contacted by a Devang Trevino Pharmacist?: No  Have you scheduled your follow up appointment?: Yes  How are you going to get to your appointment?: Car - family or friend to transport  Do you feel like you have everything you need to keep you well at home?: Yes  Care Transitions Interventions    Specialty Service Referral: Completed             Follow Up  Ned Leggett MD, [address] Luis M Lebron MD, [address] [facility]      Next Steps: Go on 2/21/2023  Appointment:   Instructions: Hospital follow up, Post op follow up    [address] [facility]  [place] , [facility]     Next Steps: Go on 2/1/2023  Appointment:   Instructions: Scheduled follow up       Care Transition Nurse provided contact information. Plan for follow-up call in 5-7 days based on severity of symptoms and risk factors.     Plan for next call:   Red Flag symptoms to report  Symptom management  Provider follow up appointment compliance  Engagement with referred services: Speech Therapy  Discuss Plan of Care  Assess for Ongoing Needs     Rena Pink RN

## 2023-03-02 ENCOUNTER — OFFICE VISIT (OUTPATIENT)
Dept: FAMILY MEDICINE CLINIC | Age: 53
End: 2023-03-02
Payer: COMMERCIAL

## 2023-03-02 VITALS
OXYGEN SATURATION: 99 % | SYSTOLIC BLOOD PRESSURE: 126 MMHG | HEART RATE: 81 BPM | WEIGHT: 180 LBS | DIASTOLIC BLOOD PRESSURE: 74 MMHG | TEMPERATURE: 99.1 F | BODY MASS INDEX: 24.41 KG/M2

## 2023-03-02 DIAGNOSIS — J20.9 ACUTE BRONCHITIS, UNSPECIFIED ORGANISM: Primary | ICD-10-CM

## 2023-03-02 DIAGNOSIS — R53.83 FATIGUE, UNSPECIFIED TYPE: ICD-10-CM

## 2023-03-02 DIAGNOSIS — R05.9 COUGH, UNSPECIFIED TYPE: ICD-10-CM

## 2023-03-02 PROCEDURE — 3078F DIAST BP <80 MM HG: CPT | Performed by: PHYSICIAN ASSISTANT

## 2023-03-02 PROCEDURE — 99213 OFFICE O/P EST LOW 20 MIN: CPT | Performed by: PHYSICIAN ASSISTANT

## 2023-03-02 PROCEDURE — 3074F SYST BP LT 130 MM HG: CPT | Performed by: PHYSICIAN ASSISTANT

## 2023-03-02 RX ORDER — BENZONATATE 200 MG/1
200 CAPSULE ORAL 3 TIMES DAILY PRN
Qty: 30 CAPSULE | Refills: 0 | Status: SHIPPED | OUTPATIENT
Start: 2023-03-02 | End: 2023-03-12

## 2023-03-02 RX ORDER — MELATONIN
5000 DAILY
Qty: 35 TABLET | Refills: 0 | Status: SHIPPED | OUTPATIENT
Start: 2023-03-02 | End: 2023-03-09

## 2023-03-02 RX ORDER — AZITHROMYCIN 250 MG/1
TABLET, FILM COATED ORAL
Qty: 6 TABLET | Refills: 0 | Status: SHIPPED | OUTPATIENT
Start: 2023-03-02 | End: 2023-03-12

## 2023-03-02 RX ORDER — ALBUTEROL SULFATE 90 UG/1
2 AEROSOL, METERED RESPIRATORY (INHALATION) 4 TIMES DAILY
Qty: 18 G | Refills: 0 | Status: SHIPPED | OUTPATIENT
Start: 2023-03-02

## 2023-03-02 SDOH — ECONOMIC STABILITY: FOOD INSECURITY: WITHIN THE PAST 12 MONTHS, THE FOOD YOU BOUGHT JUST DIDN'T LAST AND YOU DIDN'T HAVE MONEY TO GET MORE.: NEVER TRUE

## 2023-03-02 SDOH — ECONOMIC STABILITY: HOUSING INSECURITY
IN THE LAST 12 MONTHS, WAS THERE A TIME WHEN YOU DID NOT HAVE A STEADY PLACE TO SLEEP OR SLEPT IN A SHELTER (INCLUDING NOW)?: NO

## 2023-03-02 SDOH — ECONOMIC STABILITY: INCOME INSECURITY: HOW HARD IS IT FOR YOU TO PAY FOR THE VERY BASICS LIKE FOOD, HOUSING, MEDICAL CARE, AND HEATING?: NOT HARD AT ALL

## 2023-03-02 SDOH — ECONOMIC STABILITY: FOOD INSECURITY: WITHIN THE PAST 12 MONTHS, YOU WORRIED THAT YOUR FOOD WOULD RUN OUT BEFORE YOU GOT MONEY TO BUY MORE.: NEVER TRUE

## 2023-03-02 ASSESSMENT — PATIENT HEALTH QUESTIONNAIRE - PHQ9
SUM OF ALL RESPONSES TO PHQ9 QUESTIONS 1 & 2: 0
SUM OF ALL RESPONSES TO PHQ QUESTIONS 1-9: 0
1. LITTLE INTEREST OR PLEASURE IN DOING THINGS: 0
2. FEELING DOWN, DEPRESSED OR HOPELESS: 0

## 2023-03-02 ASSESSMENT — ENCOUNTER SYMPTOMS
COUGH: 1
GASTROINTESTINAL NEGATIVE: 1
EYES NEGATIVE: 1

## 2023-03-02 NOTE — PROGRESS NOTES
[address] CARE  [address] [facility] [address]  Dept: [phone]  Loc: [phone]     Pt Name: Hector De La Cruz  MRN: [id number]  Armssilvanogfflorin 1970      HISTORY OF PRESENT ILLNESS    Hector De La Cruz is a 52 y.o. male who presents to the [facility] with chief complaint cough and congestion that started about 10 days ago that is progressively getting worse. He has had chills for 10 days and feels worn out. He had nausea when it first started, but no vomiting. No diarrhea that's new. He has felt more short of breath at rest. He has a Chest CT scheduled for next week. Negative Covid swabs at home. The last one was a few days ago. He has no other new concerns at this time. REVIEW OF SYSTEMS       Review of Systems   Constitutional:  Positive for chills. HENT:  Positive for congestion. Eyes: Negative. Respiratory:  Positive for cough. Cardiovascular: Negative. Gastrointestinal: Negative. Endocrine: Negative. Genitourinary: Negative. Musculoskeletal: Negative. Skin: Negative. Neurological: Negative. Psychiatric/Behavioral: Negative.          PAST MEDICAL HISTORY     Past Medical History:   Diagnosis Date    Acquired hypothyroidism 12/20/2017    meds > 10 yrs    Anxiety 2008    Benign non-nodular prostatic hyperplasia without lower urinary tract symptoms 03/03/2016    Central retinal vein occlusion of right eye 11/9/2022    Chronic bilateral low back pain 03/29/2019    History of 2019 novel coronavirus disease (COVID-19) 09/10/2021    Hypercalcemia 1/2/2022    Hypertriglyceridemia     Leukopenia 07/24/2012    Papillary carcinoma of thyroid ([facility] [address] 02/24/2022    PONV (postoperative nausea and vomiting)     severe n/v with choli    Primary hypertension 11/9/2022         SURGICAL HISTORY       Past Surgical History:   Procedure Laterality Date    CHOLECYSTECTOMY, LAPAROSCOPIC  2010    COLONOSCOPY 06/20/2018    int hemorr, 10y repeat (DR SCHERER)    CT NEEDLE BIOPSY LUNG PERCUTANEOUS Right 01/25/2022    performed by Dr. Lisy Milligan  01/25/2022    CT NEEDLE BIOPSY LUNG PERCUTANEOUS 1/25/2022 [facility] CT SCAN    ENDOSCOPY, COLON, DIAGNOSTIC      LARYNGOSCOPY N/A 09/20/2022    SUSPENSION MICROLARYNGOSCOPY performed by Mohini Pulido MD at [address] [facility] Right 03/01/2022    EXCISION OF RIGHT AXILLARY MASS performed by Mohini Pulido MD at [address] Right 11/23/2021    Deep jugular node dissection with removal of right jovanny mass conglomerate (DR CERVANTES)    NECK SURGERY Right 11/23/2021    REMOVAL OF RIGHT NECK MASS performed by Mohini Pulido MD at [address] [facility]  12/2021    UPPER GASTROINTESTINAL ENDOSCOPY  2009    gastritis    VASECTOMY  2007    VOCAL CORD SURGERY Right 03/01/2022    RIGHT NIGEL VOCAL CORD INJECTION.  performed by Mohini Pulido MD at [address] [facility]       Current Outpatient Medications   Medication Sig Dispense Refill    benzonatate (TESSALON) 200 MG capsule Take 1 capsule by mouth 3 times daily as needed for Cough 30 capsule 0    azithromycin (ZITHROMAX) 250 MG tablet 2 TABS DAY 1 THEN 1 TAB DAYS 2-5 6 tablet 0    albuterol sulfate HFA (VENTOLIN HFA) 108 (90 Base) MCG/ACT inhaler Inhale 2 puffs into the lungs 4 times daily 18 g 0    vitamin D3 (CHOLECALCIFEROL) 25 MCG (1000 UT) TABS tablet Take 5 tablets by mouth daily for 7 days 35 tablet 0    losartan-hydroCHLOROthiazide (HYZAAR) 50-12.5 MG per tablet Take 1 tablet by mouth daily 90 tablet 1    gabapentin (NEURONTIN) 300 MG capsule TAKE 1 CAPSULE BY MOUTH AT BEDTIME      levothyroxine (SYNTHROID) 200 MCG tablet       ibuprofen (ADVIL;MOTRIN) 600 MG tablet Take 600 mg by mouth every 6 hours as needed for Pain      Probiotic Product (ACIDOPHILUS) CHEW Take by mouth      Multiple Vitamins-Minerals (THERAPEUTIC MULTIVITAMIN-MINERALS) tablet Take 1 tablet by mouth daily No current facility-administered medications for this visit.       ALLERGIES     Iv dye [iodides], Diatrizoate, Iodine, and Codeine    FAMILY HISTORY       Family History   Problem Relation Age of Onset    Diabetes Mother     Anemia Mother     High Blood Pressure Mother     Kidney Disease Mother     Arthritis Mother     Asthma Mother     Cervical Cancer Mother 43    Heart Failure Mother     High Cholesterol Mother     High Blood Pressure Father     Diabetes Father     Cirrhosis Father         Hep C    Diabetes type 2  Brother     No Known Problems Brother     Mental Retardation Maternal Uncle     No Known Problems Maternal Grandmother     No Known Problems Maternal Grandfather     No Known Problems Paternal Grandmother     No Known Problems Paternal Grandfather     No Known Problems Daughter     No Known Problems Son           SOCIAL HISTORY       Social History     Socioeconomic History    Marital status:      Spouse name: Adele    Number of children: 2    Years of education: None    Highest education level: None   Occupational History    Occupation: nursing director     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   Tobacco Use    Smoking status: Former     Packs/day: 0.30     Years: 5.00     Pack years: 1.50     Types: Cigarettes     Start date:      Quit date: 2003     Years since quittin.1    Smokeless tobacco: Never   Vaping Use    Vaping Use: Never used   Substance and Sexual Activity    Alcohol use: Yes     Comment: social    Drug use: No    Sexual activity: Yes     Partners: Female     Comment: monogamous   Social History Narrative    Lives with wife and children        1 dog        Hobbies: reading, outdoor work     Social Determinants of Health     Financial Resource Strain: Low Risk     Difficulty of Paying Living Expenses: Not hard at all   Food Insecurity: No Food Insecurity    Worried About [address] in the Last Year: Never true    [facility] in the Last Year: Never true   Transportation Needs: Unknown    Lack of Transportation (Non-Medical): No   Housing Stability: Unknown    Unstable Housing in the Last Year: No         PHYSICAL EXAM    (up to 7 for level 4, 8 or more for level 5)       Physical Exam  Constitutional:       General: He is not in acute distress. Appearance: He is well-developed. HENT:      Head: Normocephalic and atraumatic. Ears:      Comments: Mild fluid behind both TM's without erythema      Mouth/Throat:      Pharynx: Posterior oropharyngeal erythema present. No oropharyngeal exudate or uvula swelling. Eyes:      Conjunctiva/sclera: Conjunctivae normal.      Pupils: Pupils are equal, round, and reactive to light. Cardiovascular:      Rate and Rhythm: Normal rate and regular rhythm. Heart sounds: No murmur heard. Pulmonary:      Effort: No respiratory distress. Breath sounds: Normal breath sounds. No wheezing or rales. Abdominal:      General: There is no distension. Palpations: Abdomen is soft. Tenderness: There is no abdominal tenderness. Musculoskeletal:         General: Normal range of motion. Cervical back: Normal range of motion and neck supple. Skin:     General: Skin is warm and dry. Findings: No erythema or rash. Neurological:      Mental Status: He is alert and oriented to person, place, and time. Cranial Nerves: No cranial nerve deficit. Psychiatric:         Judgment: Judgment normal.         All other labs were within normal range or not returned as of this dictation. Vitals:    Vitals:    03/02/23 1440   BP: 126/74   Pulse: 81   Temp: 99.1 °F (37.3 °C)   SpO2: 99%   Weight: 180 lb (81.6 kg)       Patient will be placed on Zithromax, vitamin D3, albuterol inhaler, and Tessalon Perles for treatment at home. Patient has probiotics to use.   Patient has follow-up appointment next week with current provider for a repeat CT chest.  He is to let us know if symptoms worsen and seek emergency assessment if new concerning symptoms arise. Patient verbalizes understanding of plan at discharge and has no further questions.     DISPOSITION/PLAN       Acute Bronchtis  Cough  Fatigue

## 2023-03-02 NOTE — PATIENT INSTRUCTIONS
Organic pomegranate juice x 2 weeks paired with your probiotic after the z-girma    Green tea with honey and plenty of water   Have your vitamin D level checked

## 2023-03-06 ENCOUNTER — HOSPITAL ENCOUNTER (OUTPATIENT)
Dept: LAB | Age: 53
Discharge: HOME | End: 2023-03-06
Payer: COMMERCIAL

## 2023-03-06 ENCOUNTER — TRANSCRIBE ORDERS (OUTPATIENT)
Dept: ADMINISTRATIVE | Age: 53
End: 2023-03-06

## 2023-03-06 DIAGNOSIS — C76.0 MALIGNANT TUMOR OF HEAD AND NECK (HCC): Primary | ICD-10-CM

## 2023-03-06 LAB — TSH SERPL DL<=0.05 MIU/L-ACNC: 0.13 UIU/ML (ref 0.44–3.86)

## 2023-03-06 PROCEDURE — 36415 COLL VENOUS BLD VENIPUNCTURE: CPT

## 2023-03-06 PROCEDURE — 84432 ASSAY OF THYROGLOBULIN: CPT

## 2023-03-06 PROCEDURE — 86800 THYROGLOBULIN ANTIBODY: CPT

## 2023-03-06 PROCEDURE — 84443 ASSAY THYROID STIM HORMONE: CPT

## 2023-03-08 ENCOUNTER — HOSPITAL ENCOUNTER (OUTPATIENT)
Dept: CT IMAGING | Age: 53
Discharge: HOME | End: 2023-03-10
Payer: COMMERCIAL

## 2023-03-08 DIAGNOSIS — C73 PAPILLARY CARCINOMA OF THYROID (HCC): ICD-10-CM

## 2023-03-08 PROCEDURE — 71250 CT THORAX DX C-: CPT

## 2023-03-09 LAB — THYROGLOBULIN ANTIBODY: 2679 IU/ML (ref 0–40)

## 2023-03-10 ENCOUNTER — HOSPITAL ENCOUNTER (OUTPATIENT)
Dept: MRI IMAGING | Age: 53
Discharge: HOME | End: 2023-03-10
Payer: COMMERCIAL

## 2023-03-10 DIAGNOSIS — C76.0 MALIGNANT TUMOR OF HEAD AND NECK (HCC): ICD-10-CM

## 2023-03-10 LAB — THYROGLOBULIN BY LC-MS/MS, SERUM/PLASMA: <0.5 NG/ML (ref 1.3–31.8)

## 2023-03-10 PROCEDURE — 6360000004 HC RX CONTRAST MEDICATION: Performed by: INTERNAL MEDICINE

## 2023-03-10 PROCEDURE — A9577 INJ MULTIHANCE: HCPCS | Performed by: INTERNAL MEDICINE

## 2023-03-10 PROCEDURE — 70543 MRI ORBT/FAC/NCK W/O &W/DYE: CPT

## 2023-03-10 RX ADMIN — GADOBENATE DIMEGLUMINE 20 ML: 529 INJECTION, SOLUTION INTRAVENOUS at 09:13

## 2023-03-13 ENCOUNTER — CARE COORDINATION (OUTPATIENT)
Dept: OTHER | Facility: CLINIC | Age: 53
End: 2023-03-13

## 2023-03-15 ENCOUNTER — CARE COORDINATION (OUTPATIENT)
Dept: OTHER | Facility: CLINIC | Age: 53
End: 2023-03-15

## 2023-03-15 NOTE — CARE COORDINATION
Ambulatory Care Coordination Note  3/15/2023    Patient Current Location: [facility]     ACM contacted the patient by telephone. Verified name and  with patient as identifiers. Challenges to be reviewed by the provider   Additional needs identified to be addressed with provider: No  none         Method of communication with provider: chart routing. ACM: Prince Leydi RN      Care Coordination Interventions    Referral from Primary Care Provider: No  Suggested Interventions and Community Resources  Specialty Services Referral: Completed (Comment: Established with Enocrinology and Onclogy)  Other Services or Interventions: OON providers        Assessment:  Patient reports he is doing very well. He was seen in the walk in clinic for a URI last week and was treated with antibiotics. He denies any ongoing symptoms. He has completed treatment for his thyroid and the treatment plan at this time is just to monitor him. He does still have residual vocal cord paralysis but is able to communicate effectively. He states he has all of his medications filled and taking as directed. He denies ay ongoing ACM needs at his time. Interventions:    ACM reviewed and updated CC protocol, SDOH, medications, goals, education and disease specific assessments. Counseling and education provided at today's visit on:   Red Flag symptoms to report  Specialist appointment compliance  Utilization of appropriate level of care: Right Care, Right Place, Right Time    Medication reconciliation was performed with patient, who verbalizes understanding of administration of home medications. Advised obtaining a 90-day supply of all daily and as-needed medications. Reinforced resources available to patient including:   PCP  Specialist  Benefits related nurse triage line  Urgent care clinics  MyChart Messaging.      Plan:  ACM will sign off as patient has follow up appointments with providers scheduled/ completed routine/ diagnostic testing completed/ scheduled compliant with medication regimen appropriate utilization of services no identified social determinants of health to be addressed. Patient denies any ongoing ACM needs at this time and has ACM contact information for any future needs. Patient verbalized understanding and is agreeable. No future appointments.

## 2023-03-15 NOTE — CARE COORDINATION
Ambulatory Care Coordination Note  3/13/23    Care Coordination Interventions    Referral from Primary Care Provider: No  Suggested Interventions and Community Resources  Other Services or Interventions: OON providers       ACM contacted patient by telephone to follow up on progress, reinforce previous education/ provide patient education, and discuss any new issues or concerns. HIPAA compliant message left requesting a return phone call at their convenience to discuss. Will continue to follow. Will follow up after appt on 3/15/23.     Follow up appointments:  MD Charley Terrazas MD   [phone] [address] [place] [address] [address]      Next Steps: Go on 3/15/2023  Appointment:   Instructions: Scheduled follow up

## 2023-04-26 ENCOUNTER — HOSPITAL ENCOUNTER (OUTPATIENT)
Dept: LAB | Age: 53
Discharge: HOME | End: 2023-04-26
Payer: COMMERCIAL

## 2023-04-26 LAB — TSH SERPL-MCNC: 0.14 UIU/ML (ref 0.44–3.86)

## 2023-04-26 PROCEDURE — 84443 ASSAY THYROID STIM HORMONE: CPT

## 2023-04-26 PROCEDURE — 36415 COLL VENOUS BLD VENIPUNCTURE: CPT

## 2023-06-22 ENCOUNTER — HOSPITAL ENCOUNTER (OUTPATIENT)
Dept: LAB | Age: 53
Discharge: HOME | End: 2023-06-22
Payer: COMMERCIAL

## 2023-06-22 LAB — TSH SERPL-MCNC: 0.06 UIU/ML (ref 0.44–3.86)

## 2023-06-22 PROCEDURE — 84443 ASSAY THYROID STIM HORMONE: CPT

## 2023-06-22 PROCEDURE — 36415 COLL VENOUS BLD VENIPUNCTURE: CPT

## 2023-07-10 ENCOUNTER — HOSPITAL ENCOUNTER (OUTPATIENT)
Dept: CT IMAGING | Age: 53
Discharge: HOME | End: 2023-07-12
Payer: COMMERCIAL

## 2023-07-10 ENCOUNTER — HOSPITAL ENCOUNTER (OUTPATIENT)
Dept: LAB | Age: 53
Discharge: HOME | End: 2023-07-10
Payer: COMMERCIAL

## 2023-07-10 DIAGNOSIS — C79.9 METASTASIS FROM THYROID CANCER (HCC): ICD-10-CM

## 2023-07-10 DIAGNOSIS — C73 METASTASIS FROM THYROID CANCER (HCC): ICD-10-CM

## 2023-07-10 LAB — TSH SERPL-MCNC: 0.09 UIU/ML (ref 0.44–3.86)

## 2023-07-10 PROCEDURE — 36415 COLL VENOUS BLD VENIPUNCTURE: CPT

## 2023-07-10 PROCEDURE — 84432 ASSAY OF THYROGLOBULIN: CPT

## 2023-07-10 PROCEDURE — 71250 CT THORAX DX C-: CPT

## 2023-07-10 PROCEDURE — 86800 THYROGLOBULIN ANTIBODY: CPT

## 2023-07-10 PROCEDURE — 84443 ASSAY THYROID STIM HORMONE: CPT

## 2023-07-12 LAB — THYROGLOB IGG SER-ACNC: 4744 IU/ML (ref 0–40)

## 2023-07-15 LAB — THYROGLOB SERPL-MCNC: <0.5 NG/ML (ref 1.3–31.8)

## 2023-07-25 DIAGNOSIS — I10 PRIMARY HYPERTENSION: ICD-10-CM

## 2023-07-25 RX ORDER — LOSARTAN POTASSIUM AND HYDROCHLOROTHIAZIDE 12.5; 5 MG/1; MG/1
1 TABLET ORAL DAILY
Qty: 90 TABLET | Refills: 0 | Status: SHIPPED | OUTPATIENT
Start: 2023-07-25

## 2023-07-25 NOTE — TELEPHONE ENCOUNTER
Comments:     Last Office Visit (last PCP visit):   12/7/2022    Next Visit Date:  No future appointments. **If hasn't been seen in over a year OR hasn't followed up according to last diabetes/ADHD visit, make appointment for patient before sending refill to provider.     Rx requested:  Requested Prescriptions     Pending Prescriptions Disp Refills    losartan-hydroCHLOROthiazide (HYZAAR) 50-12.5 MG per tablet 90 tablet 1     Sig: Take 1 tablet by mouth daily

## 2023-10-02 ENCOUNTER — PATIENT MESSAGE (OUTPATIENT)
Dept: FAMILY MEDICINE CLINIC | Age: 53
End: 2023-10-02

## 2023-10-02 DIAGNOSIS — R19.7 DIARRHEA, UNSPECIFIED TYPE: Primary | ICD-10-CM

## 2023-10-02 NOTE — TELEPHONE ENCOUNTER
From: Alverto Doe  To: Dr. Angel Posada: 10/2/2023 12:27 PM EDT  Subject: Crypto    Hi, I have been having major GI and respiratory issues for 3.5 weeks, coughing, diarrhea, SOB. I thought it was just a bug. It is clearing up some now but my wife was having the same issues and seen the doc and had stool analyzed. She is positive for crypto parasite and will take meds. It is reportable and the health department has interviewed her. I likely have the same thing and will need prescription. Will you require stool sample? I can  order and kit today.  Thanks

## 2023-10-03 ENCOUNTER — HOSPITAL ENCOUNTER (OUTPATIENT)
Age: 53
Setting detail: SPECIMEN
Discharge: HOME | End: 2023-10-03
Payer: COMMERCIAL

## 2023-10-03 DIAGNOSIS — R19.7 DIARRHEA, UNSPECIFIED TYPE: ICD-10-CM

## 2023-10-03 PROCEDURE — 82274 ASSAY TEST FOR BLOOD FECAL: CPT

## 2023-10-03 PROCEDURE — 87449 NOS EACH ORGANISM AG IA: CPT

## 2023-10-03 PROCEDURE — 87324 CLOSTRIDIUM AG IA: CPT

## 2023-10-03 PROCEDURE — 83630 LACTOFERRIN FECAL (QUAL): CPT

## 2023-10-03 PROCEDURE — 87507 IADNA-DNA/RNA PROBE TQ 12-25: CPT

## 2023-10-03 PROCEDURE — 87329 GIARDIA AG IA: CPT

## 2023-10-03 PROCEDURE — 87328 CRYPTOSPORIDIUM AG IA: CPT

## 2023-10-04 LAB
ADV 40+41 DNA STL QL NAA+NON-PROBE: NOT DETECTED
C CAYETANENSIS DNA STL QL NAA+NON-PROBE: NOT DETECTED
C COLI+JEJ+UPSA DNA STL QL NAA+NON-PROBE: DETECTED
C DIFF TOX A+B STL QL IA: NORMAL
CRYPTOSP AG STL QL IA: NORMAL
CRYPTOSP DNA STL QL NAA+NON-PROBE: DETECTED
E HISTOLYT DNA STL QL NAA+NON-PROBE: NOT DETECTED
EAEC PAA PLAS AGGR+AATA ST NAA+NON-PRB: NOT DETECTED
EC STX1+STX2 GENES STL QL NAA+NON-PROBE: NOT DETECTED
EPEC EAE GENE STL QL NAA+NON-PROBE: NOT DETECTED
ETEC LTA+ST1A+ST1B TOX ST NAA+NON-PROBE: NOT DETECTED
G LAMBLIA AG STL QL IA: NORMAL
G LAMBLIA DNA STL QL NAA+NON-PROBE: NOT DETECTED
GI PATH DNA+RNA PNL STL NAA+NON-PROBE: NOT DETECTED
HEMOCCULT STL QL IA: NORMAL
LACTOFERRIN, FECAL: NEGATIVE
NOROVIRUS GI+II RNA STL QL NAA+NON-PROBE: NOT DETECTED
P SHIGELLOIDES DNA STL QL NAA+NON-PROBE: NOT DETECTED
RVA RNA STL QL NAA+NON-PROBE: NOT DETECTED
S ENT+BONG DNA STL QL NAA+NON-PROBE: NOT DETECTED
SAPO I+II+IV+V RNA STL QL NAA+NON-PROBE: NOT DETECTED
SHIGELLA SP+EIEC IPAH ST NAA+NON-PROBE: NOT DETECTED
V CHOL+PARA+VUL DNA STL QL NAA+NON-PROBE: NOT DETECTED
V CHOLERAE DNA STL QL NAA+NON-PROBE: NOT DETECTED
Y ENTEROCOL DNA STL QL NAA+NON-PROBE: NOT DETECTED

## 2023-10-05 DIAGNOSIS — A04.5 CAMPYLOBACTER DIARRHEA: Primary | ICD-10-CM

## 2023-10-05 DIAGNOSIS — A07.2 DIARRHEA DUE TO CRYPTOSPORIDIUM (HCC): ICD-10-CM

## 2023-10-05 RX ORDER — NITAZOXANIDE 500 MG/1
500 TABLET ORAL 2 TIMES DAILY WITH MEALS
Qty: 6 TABLET | Refills: 0 | Status: SHIPPED | OUTPATIENT
Start: 2023-10-05 | End: 2023-10-08

## 2023-10-05 RX ORDER — AZITHROMYCIN 500 MG/1
500 TABLET, FILM COATED ORAL DAILY
Qty: 3 TABLET | Refills: 0 | Status: SHIPPED | OUTPATIENT
Start: 2023-10-05 | End: 2023-10-08

## 2023-10-24 ENCOUNTER — OFFICE VISIT (OUTPATIENT)
Dept: FAMILY MEDICINE CLINIC | Age: 53
End: 2023-10-24
Payer: COMMERCIAL

## 2023-10-24 VITALS
DIASTOLIC BLOOD PRESSURE: 68 MMHG | HEIGHT: 72 IN | SYSTOLIC BLOOD PRESSURE: 118 MMHG | WEIGHT: 178 LBS | HEART RATE: 82 BPM | BODY MASS INDEX: 24.11 KG/M2 | OXYGEN SATURATION: 97 %

## 2023-10-24 DIAGNOSIS — J40 BRONCHITIS: ICD-10-CM

## 2023-10-24 DIAGNOSIS — C77.0 SECONDARY AND UNSPECIFIED MALIGNANT NEOPLASM OF LYMPH NODES OF HEAD, FACE AND NECK (HCC): ICD-10-CM

## 2023-10-24 DIAGNOSIS — J01.00 ACUTE NON-RECURRENT MAXILLARY SINUSITIS: Primary | ICD-10-CM

## 2023-10-24 PROCEDURE — 3078F DIAST BP <80 MM HG: CPT

## 2023-10-24 PROCEDURE — 99213 OFFICE O/P EST LOW 20 MIN: CPT

## 2023-10-24 PROCEDURE — 3074F SYST BP LT 130 MM HG: CPT

## 2023-10-24 RX ORDER — DOXYCYCLINE HYCLATE 100 MG
100 TABLET ORAL 2 TIMES DAILY
Qty: 20 TABLET | Refills: 0 | Status: SHIPPED | OUTPATIENT
Start: 2023-10-24 | End: 2023-11-03

## 2023-10-24 RX ORDER — PREDNISONE 50 MG/1
50 TABLET ORAL DAILY
Qty: 5 TABLET | Refills: 0 | Status: SHIPPED | OUTPATIENT
Start: 2023-10-24 | End: 2023-10-29

## 2023-10-24 ASSESSMENT — ENCOUNTER SYMPTOMS
WHEEZING: 0
ABDOMINAL PAIN: 0
EYES NEGATIVE: 1
SINUS PRESSURE: 1
SORE THROAT: 1
SHORTNESS OF BREATH: 1
RHINORRHEA: 0
COUGH: 1

## 2023-10-24 NOTE — PROGRESS NOTES
VITAMINS-MINERALS (THERAPEUTIC MULTIVITAMIN-MINERALS) TABLET    Take 1 tablet by mouth daily    PROBIOTIC PRODUCT (ACIDOPHILUS) CHEW    Take by mouth    VITAMIN D3 (CHOLECALCIFEROL) 25 MCG (1000 UT) TABS TABLET    Take 5 tablets by mouth daily for 7 days     ALLERGIES     Patient is is allergic to iv dye [iodides], diatrizoate, iodine, and codeine. FAMILY HISTORY     Patient'sfamily history includes Anemia in his mother; Arthritis in his mother; Asthma in his mother; Cervical Cancer (age of onset: 43) in his mother; Cirrhosis in his father; Diabetes in his father and mother; Diabetes type 2  in his brother; Heart Failure in his mother; High Blood Pressure in his father and mother; High Cholesterol in his mother; Kidney Disease in his mother; Mental Retardation in his maternal uncle; No Known Problems in his brother, daughter, maternal grandfather, maternal grandmother, paternal grandfather, paternal grandmother, and son. HISTORY     Patient  reports that he quit smoking about 20 years ago. His smoking use included cigarettes. He started smoking about 34 years ago. He has a 1.50 pack-year smoking history. He has never used smokeless tobacco. He reports current alcohol use. He reports that he does not use drugs. READY CARE COURSE   No orders of the defined types were placed in this encounter. Labs:  No results found for this visit on 10/24/23. IMAGING:  No orders to display     Scheduled Meds:  Continuous Infusions:  PRN Meds:.

## 2023-11-03 DIAGNOSIS — I10 PRIMARY HYPERTENSION: ICD-10-CM

## 2023-11-04 RX ORDER — LOSARTAN POTASSIUM AND HYDROCHLOROTHIAZIDE 12.5; 5 MG/1; MG/1
1 TABLET ORAL DAILY
Qty: 90 TABLET | Refills: 0 | Status: SHIPPED | OUTPATIENT
Start: 2023-11-04

## 2023-11-04 NOTE — TELEPHONE ENCOUNTER
Comments:     Last Office Visit (last PCP visit):   12/7/2022    Next Visit Date:  No future appointments. **If hasn't been seen in over a year OR hasn't followed up according to last diabetes/ADHD visit, make appointment for patient before sending refill to provider.     Rx requested:  Requested Prescriptions     Pending Prescriptions Disp Refills    losartan-hydroCHLOROthiazide (HYZAAR) 50-12.5 MG per tablet [Pharmacy Med Name: LOSARTAN POTASSIUM-HCT 50-12.5 TABS] 90 tablet 0     Sig: TAKE ONE TABLET BY MOUTH ONCE A DAY

## 2023-11-07 ENCOUNTER — OFFICE VISIT (OUTPATIENT)
Dept: FAMILY MEDICINE CLINIC | Age: 53
End: 2023-11-07
Payer: COMMERCIAL

## 2023-11-07 VITALS
TEMPERATURE: 97.9 F | HEIGHT: 71 IN | DIASTOLIC BLOOD PRESSURE: 60 MMHG | HEART RATE: 85 BPM | BODY MASS INDEX: 25.2 KG/M2 | SYSTOLIC BLOOD PRESSURE: 100 MMHG | WEIGHT: 180 LBS | OXYGEN SATURATION: 98 %

## 2023-11-07 DIAGNOSIS — I10 PRIMARY HYPERTENSION: ICD-10-CM

## 2023-11-07 DIAGNOSIS — Z00.00 WELL ADULT EXAM: Primary | ICD-10-CM

## 2023-11-07 DIAGNOSIS — C73 PAPILLARY CARCINOMA OF THYROID (HCC): ICD-10-CM

## 2023-11-07 DIAGNOSIS — E78.1 HYPERTRIGLYCERIDEMIA: ICD-10-CM

## 2023-11-07 DIAGNOSIS — Z23 NEED FOR VACCINATION: ICD-10-CM

## 2023-11-07 DIAGNOSIS — Z12.5 SCREENING FOR PROSTATE CANCER: ICD-10-CM

## 2023-11-07 DIAGNOSIS — C78.02 SECONDARY MALIGNANT NEOPLASM OF LEFT LUNG (HCC): ICD-10-CM

## 2023-11-07 DIAGNOSIS — E03.9 ACQUIRED HYPOTHYROIDISM: Chronic | ICD-10-CM

## 2023-11-07 DIAGNOSIS — C77.0 SECONDARY AND UNSPECIFIED MALIGNANT NEOPLASM OF LYMPH NODES OF HEAD, FACE AND NECK (HCC): ICD-10-CM

## 2023-11-07 PROBLEM — R49.0 DYSPHONIA: Status: ACTIVE | Noted: 2023-11-07

## 2023-11-07 PROCEDURE — 3078F DIAST BP <80 MM HG: CPT | Performed by: FAMILY MEDICINE

## 2023-11-07 PROCEDURE — 99396 PREV VISIT EST AGE 40-64: CPT | Performed by: FAMILY MEDICINE

## 2023-11-07 PROCEDURE — 3074F SYST BP LT 130 MM HG: CPT | Performed by: FAMILY MEDICINE

## 2023-11-07 RX ORDER — OMEGA-3-ACID ETHYL ESTERS 1 G/1
2 CAPSULE, LIQUID FILLED ORAL 2 TIMES DAILY
COMMUNITY

## 2023-11-07 RX ORDER — LOSARTAN POTASSIUM AND HYDROCHLOROTHIAZIDE 12.5; 5 MG/1; MG/1
1 TABLET ORAL DAILY
Qty: 30 TABLET | Refills: 0 | Status: SHIPPED | OUTPATIENT
Start: 2023-11-07

## 2023-11-07 RX ORDER — DIPHENHYDRAMINE HCL 25 MG
CAPSULE ORAL
COMMUNITY
Start: 2021-09-10

## 2023-11-07 RX ORDER — ACETAMINOPHEN 325 MG/1
TABLET ORAL
COMMUNITY

## 2023-11-07 ASSESSMENT — ENCOUNTER SYMPTOMS
RHINORRHEA: 0
SHORTNESS OF BREATH: 0
SORE THROAT: 0
VOICE CHANGE: 1
EYE DISCHARGE: 0
DIARRHEA: 0
CHEST TIGHTNESS: 0
CONSTIPATION: 0
VOMITING: 0
ANAL BLEEDING: 0
ABDOMINAL PAIN: 0
EYE PAIN: 0
ABDOMINAL DISTENTION: 0
BLOOD IN STOOL: 0
WHEEZING: 0
TROUBLE SWALLOWING: 0
SINUS PRESSURE: 0
NAUSEA: 0
COLOR CHANGE: 0
COUGH: 0

## 2023-11-07 NOTE — PROGRESS NOTES
tablet by mouth every 6 hours as needed for Pain      Probiotic Product (ACIDOPHILUS) CHEW Take by mouth      Multiple Vitamins-Minerals (THERAPEUTIC MULTIVITAMIN-MINERALS) tablet Take 1 tablet by mouth daily      albuterol sulfate HFA (VENTOLIN HFA) 108 (90 Base) MCG/ACT inhaler Inhale 2 puffs into the lungs 4 times daily (Patient not taking: Reported on 11/7/2023) 18 g 0    vitamin D3 (CHOLECALCIFEROL) 25 MCG (1000 UT) TABS tablet Take 5 tablets by mouth daily for 7 days (Patient not taking: Reported on 11/7/2023) 35 tablet 0    gabapentin (NEURONTIN) 300 MG capsule TAKE 1 CAPSULE BY MOUTH AT BEDTIME (Patient not taking: Reported on 11/7/2023)       No current facility-administered medications on file prior to visit. Allergies   Allergen Reactions    Iv Dye [Iodides] Anaphylaxis    Diatrizoate      Other reaction(s): Unknown    Iodine Other (See Comments)    Codeine Nausea And Vomiting        Review of Systems   Constitutional:  Negative for appetite change, chills, diaphoresis, fatigue, fever and unexpected weight change. HENT:  Positive for voice change (hoarseness, chronic s/p thyroid surgery). Negative for congestion, ear pain, postnasal drip, rhinorrhea, sinus pressure, sore throat and trouble swallowing. Eyes:  Negative for pain, discharge and visual disturbance. Respiratory:  Negative for cough, chest tightness, shortness of breath and wheezing. Cardiovascular:  Negative for chest pain, palpitations and leg swelling. No orthopnea, No PND   Gastrointestinal:  Negative for abdominal distention, abdominal pain, anal bleeding, blood in stool, constipation, diarrhea, nausea and vomiting. No heartburn, No melena   Endocrine: Negative for cold intolerance, heat intolerance, polydipsia and polyuria. Genitourinary:  Negative for dysuria, flank pain, frequency, hematuria and urgency. Musculoskeletal:  Negative for gait problem and myalgias.    Skin:  Negative for color change and

## 2023-11-15 ENCOUNTER — HOSPITAL ENCOUNTER (OUTPATIENT)
Dept: LAB | Age: 53
Discharge: HOME | End: 2023-11-15
Payer: COMMERCIAL

## 2023-11-15 DIAGNOSIS — Z12.5 SCREENING FOR PROSTATE CANCER: ICD-10-CM

## 2023-11-15 DIAGNOSIS — Z00.00 WELL ADULT EXAM: ICD-10-CM

## 2023-11-15 DIAGNOSIS — E03.9 ACQUIRED HYPOTHYROIDISM: Chronic | ICD-10-CM

## 2023-11-15 DIAGNOSIS — E78.1 HYPERTRIGLYCERIDEMIA: ICD-10-CM

## 2023-11-15 LAB
ALBUMIN SERPL-MCNC: 4 G/DL (ref 3.5–4.6)
ALP SERPL-CCNC: 54 U/L (ref 35–104)
ALT SERPL-CCNC: 15 U/L (ref 0–41)
ANION GAP SERPL CALCULATED.3IONS-SCNC: 10 MEQ/L (ref 9–15)
AST SERPL-CCNC: 23 U/L (ref 0–40)
BASOPHILS # BLD: 0 K/UL (ref 0–0.2)
BASOPHILS NFR BLD: 0.4 %
BILIRUB SERPL-MCNC: 0.7 MG/DL (ref 0.2–0.7)
BUN SERPL-MCNC: 18 MG/DL (ref 6–20)
CALCIUM SERPL-MCNC: 9.2 MG/DL (ref 8.5–9.9)
CHLORIDE SERPL-SCNC: 105 MEQ/L (ref 95–107)
CHOLEST SERPL-MCNC: 129 MG/DL (ref 0–199)
CO2 SERPL-SCNC: 27 MEQ/L (ref 20–31)
CREAT SERPL-MCNC: 1.22 MG/DL (ref 0.7–1.2)
EOSINOPHIL # BLD: 0.1 K/UL (ref 0–0.7)
EOSINOPHIL NFR BLD: 1.3 %
ERYTHROCYTE [DISTWIDTH] IN BLOOD BY AUTOMATED COUNT: 13.5 % (ref 11.5–14.5)
GLOBULIN SER CALC-MCNC: 2.8 G/DL (ref 2.3–3.5)
GLUCOSE SERPL-MCNC: 88 MG/DL (ref 70–99)
HCT VFR BLD AUTO: 39.1 % (ref 42–52)
HDLC SERPL-MCNC: 43 MG/DL (ref 40–59)
HGB BLD-MCNC: 12.8 G/DL (ref 14–18)
LDLC SERPL CALC-MCNC: 68 MG/DL (ref 0–129)
LYMPHOCYTES # BLD: 1.1 K/UL (ref 1–4.8)
LYMPHOCYTES NFR BLD: 23.4 %
MCH RBC QN AUTO: 31.2 PG (ref 27–31.3)
MCHC RBC AUTO-ENTMCNC: 32.7 % (ref 33–37)
MCV RBC AUTO: 95.4 FL (ref 79–92.2)
MONOCYTES # BLD: 0.5 K/UL (ref 0.2–0.8)
MONOCYTES NFR BLD: 11.3 %
NEUTROPHILS # BLD: 3 K/UL (ref 1.4–6.5)
NEUTS SEG NFR BLD: 63.4 %
PLATELET # BLD AUTO: 238 K/UL (ref 130–400)
POTASSIUM SERPL-SCNC: 3.8 MEQ/L (ref 3.4–4.9)
PROT SERPL-MCNC: 6.8 G/DL (ref 6.3–8)
PSA SERPL-MCNC: 0.49 NG/ML (ref 0–4)
RBC # BLD AUTO: 4.1 M/UL (ref 4.7–6.1)
SODIUM SERPL-SCNC: 142 MEQ/L (ref 135–144)
T4 FREE SERPL-MCNC: 1.82 NG/DL (ref 0.84–1.68)
TRIGL SERPL-MCNC: 88 MG/DL (ref 0–150)
TSH SERPL-MCNC: 0.03 UIU/ML (ref 0.44–3.86)
WBC # BLD AUTO: 4.7 K/UL (ref 4.8–10.8)

## 2023-11-15 PROCEDURE — 80053 COMPREHEN METABOLIC PANEL: CPT

## 2023-11-15 PROCEDURE — 85025 COMPLETE CBC W/AUTO DIFF WBC: CPT

## 2023-11-15 PROCEDURE — 84439 ASSAY OF FREE THYROXINE: CPT

## 2023-11-15 PROCEDURE — 84153 ASSAY OF PSA TOTAL: CPT

## 2023-11-15 PROCEDURE — 84443 ASSAY THYROID STIM HORMONE: CPT

## 2023-11-15 PROCEDURE — 80061 LIPID PANEL: CPT

## 2023-11-15 PROCEDURE — 36415 COLL VENOUS BLD VENIPUNCTURE: CPT

## 2023-11-21 DIAGNOSIS — E03.9 ACQUIRED HYPOTHYROIDISM: Chronic | ICD-10-CM

## 2023-11-21 DIAGNOSIS — D64.9 ANEMIA, UNSPECIFIED TYPE: Primary | ICD-10-CM

## 2023-11-21 DIAGNOSIS — C73 PAPILLARY CARCINOMA OF THYROID (HCC): ICD-10-CM

## 2023-12-04 ENCOUNTER — OFFICE VISIT (OUTPATIENT)
Dept: FAMILY MEDICINE CLINIC | Age: 53
End: 2023-12-04
Payer: COMMERCIAL

## 2023-12-04 VITALS
WEIGHT: 175 LBS | TEMPERATURE: 99.4 F | BODY MASS INDEX: 24.41 KG/M2 | DIASTOLIC BLOOD PRESSURE: 62 MMHG | SYSTOLIC BLOOD PRESSURE: 118 MMHG | OXYGEN SATURATION: 97 % | HEART RATE: 92 BPM

## 2023-12-04 DIAGNOSIS — J01.90 ACUTE BACTERIAL SINUSITIS: Primary | ICD-10-CM

## 2023-12-04 DIAGNOSIS — J20.9 ACUTE BRONCHITIS, UNSPECIFIED ORGANISM: ICD-10-CM

## 2023-12-04 DIAGNOSIS — B96.89 ACUTE BACTERIAL SINUSITIS: Primary | ICD-10-CM

## 2023-12-04 PROCEDURE — 3078F DIAST BP <80 MM HG: CPT

## 2023-12-04 PROCEDURE — 3074F SYST BP LT 130 MM HG: CPT

## 2023-12-04 PROCEDURE — 99213 OFFICE O/P EST LOW 20 MIN: CPT

## 2023-12-04 RX ORDER — PREDNISONE 10 MG/1
TABLET ORAL
Qty: 20 TABLET | Refills: 0 | Status: SHIPPED | OUTPATIENT
Start: 2023-12-04 | End: 2023-12-17

## 2023-12-04 RX ORDER — AZITHROMYCIN 250 MG/1
250 TABLET, FILM COATED ORAL SEE ADMIN INSTRUCTIONS
Qty: 6 TABLET | Refills: 0 | Status: SHIPPED | OUTPATIENT
Start: 2023-12-04 | End: 2023-12-09

## 2023-12-04 RX ORDER — AMOXICILLIN AND CLAVULANATE POTASSIUM 875; 125 MG/1; MG/1
1 TABLET, FILM COATED ORAL 2 TIMES DAILY
Qty: 20 TABLET | Refills: 0 | Status: SHIPPED | OUTPATIENT
Start: 2023-12-04 | End: 2023-12-14

## 2023-12-04 ASSESSMENT — ENCOUNTER SYMPTOMS
SORE THROAT: 0
DIARRHEA: 0
NAUSEA: 0
COUGH: 1
VOMITING: 0
RHINORRHEA: 1
SHORTNESS OF BREATH: 1

## 2023-12-04 NOTE — PROGRESS NOTES
Oropharynx is clear. No oropharyngeal exudate or posterior oropharyngeal erythema. Eyes:      Conjunctiva/sclera: Conjunctivae normal.   Cardiovascular:      Rate and Rhythm: Normal rate and regular rhythm. Heart sounds: Normal heart sounds. Pulmonary:      Effort: Pulmonary effort is normal.      Breath sounds: Normal breath sounds. Lymphadenopathy:      Cervical: No cervical adenopathy. Skin:     General: Skin is warm and dry. Neurological:      General: No focal deficit present. Mental Status: He is alert. Psychiatric:         Mood and Affect: Mood normal.         Behavior: Behavior normal.         ASSESSMENT/PLAN:  1. Acute bacterial sinusitis  -     amoxicillin-clavulanate (AUGMENTIN) 875-125 MG per tablet; Take 1 tablet by mouth 2 times daily for 10 days, Disp-20 tablet, R-0Normal  -     azithromycin (ZITHROMAX) 250 MG tablet; Take 1 tablet by mouth See Admin Instructions for 5 days 500mg on day 1 followed by 250mg on days 2 - 5, Disp-6 tablet, R-0Normal  -     predniSONE (DELTASONE) 10 MG tablet; Take 3 tabs for 3 days, then 2 tabs for 3 days, then 1 tab for 3 days, then 1/2 tab for 4 days, then stop., Disp-20 tablet, R-0Normal  2. Acute bronchitis, unspecified organism  -     amoxicillin-clavulanate (AUGMENTIN) 875-125 MG per tablet; Take 1 tablet by mouth 2 times daily for 10 days, Disp-20 tablet, R-0Normal  -     azithromycin (ZITHROMAX) 250 MG tablet; Take 1 tablet by mouth See Admin Instructions for 5 days 500mg on day 1 followed by 250mg on days 2 - 5, Disp-6 tablet, R-0Normal  -     predniSONE (DELTASONE) 10 MG tablet;  Take 3 tabs for 3 days, then 2 tabs for 3 days, then 1 tab for 3 days, then 1/2 tab for 4 days, then stop., Disp-20 tablet, R-0Normal        Electronically signed by YUE Rodriguez CNP on 12/4/23 at 11:49 AM EST

## 2023-12-13 ENCOUNTER — HOSPITAL ENCOUNTER (OUTPATIENT)
Dept: LAB | Age: 53
Discharge: HOME | End: 2023-12-13
Payer: COMMERCIAL

## 2023-12-13 ENCOUNTER — HOSPITAL ENCOUNTER (OUTPATIENT)
Dept: CT IMAGING | Age: 53
Discharge: HOME | End: 2023-12-15
Payer: COMMERCIAL

## 2023-12-13 DIAGNOSIS — C73 MALIGNANT NEOPLASM OF THYROID GLAND (HCC): ICD-10-CM

## 2023-12-13 LAB — TSH SERPL-MCNC: 0.14 UIU/ML (ref 0.44–3.86)

## 2023-12-13 PROCEDURE — 71250 CT THORAX DX C-: CPT

## 2023-12-13 PROCEDURE — 86800 THYROGLOBULIN ANTIBODY: CPT

## 2023-12-13 PROCEDURE — 84432 ASSAY OF THYROGLOBULIN: CPT

## 2023-12-13 PROCEDURE — 84443 ASSAY THYROID STIM HORMONE: CPT

## 2023-12-13 PROCEDURE — 36415 COLL VENOUS BLD VENIPUNCTURE: CPT

## 2023-12-15 ENCOUNTER — HOSPITAL ENCOUNTER (OUTPATIENT)
Dept: MRI IMAGING | Age: 53
Discharge: HOME | End: 2023-12-15
Payer: COMMERCIAL

## 2023-12-15 DIAGNOSIS — C76.0 MALIGNANT NEOPLASM OF HEAD, FACE, AND NECK (HCC): ICD-10-CM

## 2023-12-15 PROCEDURE — A9577 INJ MULTIHANCE: HCPCS | Performed by: INTERNAL MEDICINE

## 2023-12-15 PROCEDURE — 70543 MRI ORBT/FAC/NCK W/O &W/DYE: CPT

## 2023-12-15 PROCEDURE — 6360000004 HC RX CONTRAST MEDICATION: Performed by: INTERNAL MEDICINE

## 2023-12-15 RX ADMIN — GADOBENATE DIMEGLUMINE 15 ML: 529 INJECTION, SOLUTION INTRAVENOUS at 14:59

## 2023-12-17 LAB
THYROGLOB IGG SER-ACNC: 3010 IU/ML (ref 0–40)
THYROGLOB SERPL-MCNC: 0.6 NG/ML (ref 1.3–31.8)

## 2024-01-05 ENCOUNTER — OFFICE VISIT (OUTPATIENT)
Dept: OTOLARYNGOLOGY | Facility: CLINIC | Age: 54
End: 2024-01-05
Payer: COMMERCIAL

## 2024-01-05 DIAGNOSIS — C73 PAPILLARY THYROID CARCINOMA (MULTI): ICD-10-CM

## 2024-01-05 DIAGNOSIS — L03.221 CELLULITIS OF NECK: Primary | ICD-10-CM

## 2024-01-05 PROCEDURE — 99213 OFFICE O/P EST LOW 20 MIN: CPT | Performed by: OTOLARYNGOLOGY

## 2024-01-05 RX ORDER — DOXYCYCLINE 100 MG/1
100 TABLET ORAL 2 TIMES DAILY
Qty: 28 TABLET | Refills: 0 | Status: SHIPPED | OUTPATIENT
Start: 2024-01-05 | End: 2024-01-19

## 2024-01-05 NOTE — PROGRESS NOTES
Patient returns.  We are seeing him back today for follow-up check history of progressive thyroid cancer now with evidence of possible cystic lesion in the skin of the left submandibular region.  This developed over the last several weeks and then just yesterday started draining little bit.  He did see purulent drainage.  Denies any symptoms of pain.  All remaining ENT inquiry is otherwise unremarkable and unchanged.  Voice has been stable.  Heading back down to [facility] next week for surveillance with history of this cancer being treated by endocrine oncology at [facility].  There have been no significant changes in past medical or past surgical histories except as mentioned.    Physical exam:  No acute distress.  The external ear structures appear normal. The ear canals patent and the tympanic membranes are intact without evidence of air-fluid levels, retraction, or congenital defects.  Anterior rhinoscopy notes essentially a midline nasal septum. Examination is noted for normal healthy mucosal membranes without any evidence of lesions, polyps, or exudate. The tongue is normally mobile. There are no lesions on the gingiva, buccal, or oral mucosa. There are no oral cavity masses.  The neck is negative for mass lymphadenopathy. The trachea and parotid are clear.  There is evidence of a possible abscess noted left neck at the site of concern.  There are several associated lymph nodes present likely reactive.  These are all very superficial.  The thyroid bed is grossly unremarkable. The salivary gland structures are grossly unremarkable.    Assessment and plan: 1.  History of aggressive metastatic papillary thyroid carcinoma.  Overall doing fairly well.  He will see [facility] medical oncology in the endocrine department next week.  2.  Suspected cellulitis or abscess in the skin as described.  This is very superficial in the skin itself so I suspect it truly is of skin origin and not more concern for dermal  met.  Will be treated with antibiotics and he will see me back in a couple weeks.  All questions were answered in this regard accordingly.

## 2024-02-20 DIAGNOSIS — I10 PRIMARY HYPERTENSION: ICD-10-CM

## 2024-02-21 RX ORDER — LOSARTAN POTASSIUM AND HYDROCHLOROTHIAZIDE 12.5; 5 MG/1; MG/1
1 TABLET ORAL DAILY
Qty: 90 TABLET | Refills: 1 | Status: SHIPPED | OUTPATIENT
Start: 2024-02-21

## 2024-02-21 NOTE — TELEPHONE ENCOUNTER
Comments:     Last Office Visit (last PCP visit):   11/7/2023    Next Visit Date:  No future appointments.    **If hasn't been seen in over a year OR hasn't followed up according to last diabetes/ADHD visit, make appointment for patient before sending refill to provider.    Rx requested:  Requested Prescriptions     Pending Prescriptions Disp Refills    losartan-hydroCHLOROthiazide (HYZAAR) 50-12.5 MG per tablet [Pharmacy Med Name: LOSARTAN POTASSIUM-HCT 50-12.5 TABS] 90 tablet 0     Sig: TAKE ONE TABLET BY MOUTH ONCE A DAY

## 2024-03-20 ENCOUNTER — HOSPITAL ENCOUNTER (OUTPATIENT)
Dept: LAB | Age: 54
Discharge: HOME | End: 2024-03-20
Payer: COMMERCIAL

## 2024-03-20 DIAGNOSIS — D64.9 ANEMIA, UNSPECIFIED TYPE: ICD-10-CM

## 2024-03-20 DIAGNOSIS — E03.9 ACQUIRED HYPOTHYROIDISM: Chronic | ICD-10-CM

## 2024-03-20 DIAGNOSIS — C73 PAPILLARY CARCINOMA OF THYROID (HCC): ICD-10-CM

## 2024-03-20 LAB
BASOPHILS # BLD: 0 K/UL (ref 0–0.2)
BASOPHILS NFR BLD: 0.5 %
EOSINOPHIL # BLD: 0.1 K/UL (ref 0–0.7)
EOSINOPHIL NFR BLD: 2.3 %
ERYTHROCYTE [DISTWIDTH] IN BLOOD BY AUTOMATED COUNT: 13.1 % (ref 11.5–14.5)
HCT VFR BLD AUTO: 38.6 % (ref 42–52)
HGB BLD-MCNC: 12.7 G/DL (ref 14–18)
LYMPHOCYTES # BLD: 1.3 K/UL (ref 1–4.8)
LYMPHOCYTES NFR BLD: 29.6 %
MCH RBC QN AUTO: 29.7 PG (ref 27–31.3)
MCHC RBC AUTO-ENTMCNC: 32.9 % (ref 33–37)
MCV RBC AUTO: 90.4 FL (ref 79–92.2)
MONOCYTES # BLD: 0.5 K/UL (ref 0.2–0.8)
MONOCYTES NFR BLD: 10.8 %
NEUTROPHILS # BLD: 2.5 K/UL (ref 1.4–6.5)
NEUTS SEG NFR BLD: 56.6 %
PLATELET # BLD AUTO: 241 K/UL (ref 130–400)
RBC # BLD AUTO: 4.27 M/UL (ref 4.7–6.1)
T4 FREE SERPL-MCNC: 2.19 NG/DL (ref 0.84–1.68)
TSH SERPL-MCNC: <0.01 UIU/ML (ref 0.44–3.86)
WBC # BLD AUTO: 4.4 K/UL (ref 4.8–10.8)

## 2024-03-20 PROCEDURE — 85025 COMPLETE CBC W/AUTO DIFF WBC: CPT

## 2024-03-20 PROCEDURE — 84439 ASSAY OF FREE THYROXINE: CPT

## 2024-03-20 PROCEDURE — 84443 ASSAY THYROID STIM HORMONE: CPT

## 2024-03-20 PROCEDURE — 36415 COLL VENOUS BLD VENIPUNCTURE: CPT

## 2024-03-28 ENCOUNTER — OFFICE VISIT (OUTPATIENT)
Dept: FAMILY MEDICINE CLINIC | Age: 54
End: 2024-03-28
Payer: COMMERCIAL

## 2024-03-28 VITALS
OXYGEN SATURATION: 98 % | HEART RATE: 88 BPM | SYSTOLIC BLOOD PRESSURE: 110 MMHG | WEIGHT: 180 LBS | TEMPERATURE: 97.7 F | BODY MASS INDEX: 24.38 KG/M2 | DIASTOLIC BLOOD PRESSURE: 68 MMHG | HEIGHT: 72 IN

## 2024-03-28 DIAGNOSIS — C73 PAPILLARY CARCINOMA OF THYROID (HCC): ICD-10-CM

## 2024-03-28 DIAGNOSIS — N52.9 ERECTILE DYSFUNCTION, UNSPECIFIED ERECTILE DYSFUNCTION TYPE: ICD-10-CM

## 2024-03-28 DIAGNOSIS — C78.02 SECONDARY MALIGNANT NEOPLASM OF LEFT LUNG (HCC): ICD-10-CM

## 2024-03-28 DIAGNOSIS — E78.1 HYPERTRIGLYCERIDEMIA: ICD-10-CM

## 2024-03-28 DIAGNOSIS — E03.9 ACQUIRED HYPOTHYROIDISM: Chronic | ICD-10-CM

## 2024-03-28 DIAGNOSIS — D64.9 NORMOCYTIC ANEMIA: ICD-10-CM

## 2024-03-28 DIAGNOSIS — C77.0 SECONDARY AND UNSPECIFIED MALIGNANT NEOPLASM OF LYMPH NODES OF HEAD, FACE AND NECK (HCC): ICD-10-CM

## 2024-03-28 DIAGNOSIS — I10 PRIMARY HYPERTENSION: Primary | ICD-10-CM

## 2024-03-28 DIAGNOSIS — Z12.5 SCREENING FOR PROSTATE CANCER: ICD-10-CM

## 2024-03-28 PROBLEM — R22.1 MASS OF RIGHT SIDE OF NECK: Status: RESOLVED | Noted: 2021-11-16 | Resolved: 2024-03-28

## 2024-03-28 PROCEDURE — 3078F DIAST BP <80 MM HG: CPT | Performed by: FAMILY MEDICINE

## 2024-03-28 PROCEDURE — 3074F SYST BP LT 130 MM HG: CPT | Performed by: FAMILY MEDICINE

## 2024-03-28 PROCEDURE — 99214 OFFICE O/P EST MOD 30 MIN: CPT | Performed by: FAMILY MEDICINE

## 2024-03-28 RX ORDER — TADALAFIL 20 MG/1
20 TABLET ORAL DAILY PRN
Qty: 10 TABLET | Refills: 0 | Status: SHIPPED | OUTPATIENT
Start: 2024-03-28

## 2024-03-28 RX ORDER — LOSARTAN POTASSIUM AND HYDROCHLOROTHIAZIDE 12.5; 5 MG/1; MG/1
1 TABLET ORAL DAILY
Qty: 90 TABLET | Refills: 1 | Status: SHIPPED | OUTPATIENT
Start: 2024-03-28

## 2024-03-28 SDOH — ECONOMIC STABILITY: FOOD INSECURITY: WITHIN THE PAST 12 MONTHS, YOU WORRIED THAT YOUR FOOD WOULD RUN OUT BEFORE YOU GOT MONEY TO BUY MORE.: NEVER TRUE

## 2024-03-28 SDOH — ECONOMIC STABILITY: FOOD INSECURITY: WITHIN THE PAST 12 MONTHS, THE FOOD YOU BOUGHT JUST DIDN'T LAST AND YOU DIDN'T HAVE MONEY TO GET MORE.: NEVER TRUE

## 2024-03-28 SDOH — ECONOMIC STABILITY: INCOME INSECURITY: HOW HARD IS IT FOR YOU TO PAY FOR THE VERY BASICS LIKE FOOD, HOUSING, MEDICAL CARE, AND HEATING?: NOT HARD AT ALL

## 2024-03-28 ASSESSMENT — ENCOUNTER SYMPTOMS
VOMITING: 0
RECTAL PAIN: 0
COUGH: 0
ABDOMINAL DISTENTION: 0
ABDOMINAL PAIN: 0
ANAL BLEEDING: 0
DIARRHEA: 0
NAUSEA: 0
WHEEZING: 0
BLOOD IN STOOL: 0
CHEST TIGHTNESS: 0
CONSTIPATION: 0
SHORTNESS OF BREATH: 0

## 2024-03-28 ASSESSMENT — PATIENT HEALTH QUESTIONNAIRE - PHQ9
SUM OF ALL RESPONSES TO PHQ QUESTIONS 1-9: 0
6. FEELING BAD ABOUT YOURSELF - OR THAT YOU ARE A FAILURE OR HAVE LET YOURSELF OR YOUR FAMILY DOWN: NOT AT ALL
SUM OF ALL RESPONSES TO PHQ9 QUESTIONS 1 & 2: 0
4. FEELING TIRED OR HAVING LITTLE ENERGY: NOT AT ALL
7. TROUBLE CONCENTRATING ON THINGS, SUCH AS READING THE NEWSPAPER OR WATCHING TELEVISION: NOT AT ALL
3. TROUBLE FALLING OR STAYING ASLEEP: NOT AT ALL
SUM OF ALL RESPONSES TO PHQ QUESTIONS 1-9: 0
2. FEELING DOWN, DEPRESSED OR HOPELESS: NOT AT ALL
1. LITTLE INTEREST OR PLEASURE IN DOING THINGS: NOT AT ALL
SUM OF ALL RESPONSES TO PHQ QUESTIONS 1-9: 0
SUM OF ALL RESPONSES TO PHQ QUESTIONS 1-9: 0
9. THOUGHTS THAT YOU WOULD BE BETTER OFF DEAD, OR OF HURTING YOURSELF: NOT AT ALL
5. POOR APPETITE OR OVEREATING: NOT AT ALL
8. MOVING OR SPEAKING SO SLOWLY THAT OTHER PEOPLE COULD HAVE NOTICED. OR THE OPPOSITE, BEING SO FIGETY OR RESTLESS THAT YOU HAVE BEEN MOVING AROUND A LOT MORE THAN USUAL: NOT AT ALL
10. IF YOU CHECKED OFF ANY PROBLEMS, HOW DIFFICULT HAVE THESE PROBLEMS MADE IT FOR YOU TO DO YOUR WORK, TAKE CARE OF THINGS AT HOME, OR GET ALONG WITH OTHER PEOPLE: NOT DIFFICULT AT ALL

## 2024-03-28 NOTE — ASSESSMENT & PLAN NOTE
Likely related to known BPH. Will have patient use PRN Cialis for management. Instructions given to use 0.5 tablet of prescribed dose and continue with this if effective. If not, would have him use full tablet. If there is insufficient benefit despite medication we would refer to urology for further evaluation and potential further management.

## 2024-03-28 NOTE — ASSESSMENT & PLAN NOTE
Blood pressure within normal limits today in the office.  Patient instructed to continue with current dose of losartan-hydrochlorothiazide.  Should BP values remain frequently in the low 100s or drop to the 90s systolic with more frequent bouts of lightheadedness patient was instructed to call the office for reduction in dosing.

## 2024-03-28 NOTE — ASSESSMENT & PLAN NOTE
Most recent lipid panel at goal control.  Patient instructed to continue with Lovaza at current dosing.

## 2024-03-28 NOTE — ASSESSMENT & PLAN NOTE
Medication management per endocrinology at [facility].  Goal is for TSH <0.1 per specialist. We will continue to follow labwork over time.  Patient instructed to continue with current dose of levothyroxine as recommended by endocrinology office and contact the specialist should he develop and worsening temperature intolerance, persistent sweats, palpitations/tachycardia, diarrhea, change in appetite or unintentional weight changes.

## 2024-03-28 NOTE — PROGRESS NOTES
Jony Mcallister (: 1970) is a 54 y.o. male, Established patient, who presents today for:    Chief Complaint   Patient presents with    Follow-up     No concerns          ASSESSMENT/PLAN:    1. Primary hypertension  Assessment & Plan:  Blood pressure within normal limits today in the office.  Patient instructed to continue with current dose of losartan-hydrochlorothiazide.  Should BP values remain frequently in the low 100s or drop to the 90s systolic with more frequent bouts of lightheadedness patient was instructed to call the office for reduction in dosing.  Orders:  -     losartan-hydroCHLOROthiazide (HYZAAR) 50-12.5 MG per tablet; Take 1 tablet by mouth daily, Disp-90 tablet, R-1Normal  2. Hypertriglyceridemia  Assessment & Plan:  Most recent lipid panel at goal control.  Patient instructed to continue with Lovaza at current dosing.  Orders:  -     Comprehensive Metabolic Panel; Future  -     Lipid Panel; Future  3. Papillary carcinoma of thyroid (HCC)  Assessment & Plan:   Monitored by specialist- no acute findings meriting change in the plan  Orders:  -     TSH; Future  -     T4, Free; Future  4. Secondary and unspecified malignant neoplasm of lymph nodes of head, face and neck (HCC)  Assessment & Plan:   Monitored by specialist- no acute findings meriting change in the plan  5. Secondary malignant neoplasm of left lung (HCC)  Assessment & Plan:   Monitored by specialist- no acute findings meriting change in the plan  6. Acquired hypothyroidism  Assessment & Plan:  Medication management per endocrinology at [facility].  Goal is for TSH <0.1 per specialist. We will continue to follow labwork over time.  Patient instructed to continue with current dose of levothyroxine as recommended by endocrinology office and contact the specialist should he develop and worsening temperature intolerance, persistent sweats, palpitations/tachycardia, diarrhea, change in appetite or unintentional weight changes.

## 2024-04-22 ENCOUNTER — OFFICE VISIT (OUTPATIENT)
Dept: FAMILY MEDICINE CLINIC | Age: 54
End: 2024-04-22
Payer: COMMERCIAL

## 2024-04-22 VITALS
BODY MASS INDEX: 24.41 KG/M2 | OXYGEN SATURATION: 94 % | DIASTOLIC BLOOD PRESSURE: 62 MMHG | WEIGHT: 180 LBS | HEART RATE: 82 BPM | SYSTOLIC BLOOD PRESSURE: 114 MMHG

## 2024-04-22 DIAGNOSIS — J40 BRONCHITIS: Primary | ICD-10-CM

## 2024-04-22 PROCEDURE — 3074F SYST BP LT 130 MM HG: CPT

## 2024-04-22 PROCEDURE — 99213 OFFICE O/P EST LOW 20 MIN: CPT

## 2024-04-22 PROCEDURE — 3078F DIAST BP <80 MM HG: CPT

## 2024-04-22 RX ORDER — PREDNISONE 20 MG/1
20 TABLET ORAL DAILY
Qty: 5 TABLET | Refills: 0 | Status: SHIPPED | OUTPATIENT
Start: 2024-04-22 | End: 2024-04-27

## 2024-04-22 RX ORDER — AZITHROMYCIN 250 MG/1
TABLET, FILM COATED ORAL
Qty: 6 TABLET | Refills: 0 | Status: SHIPPED | OUTPATIENT
Start: 2024-04-22 | End: 2024-05-02

## 2024-04-22 ASSESSMENT — ENCOUNTER SYMPTOMS
EYES NEGATIVE: 1
COUGH: 1
SINUS PRESSURE: 0
SHORTNESS OF BREATH: 0
WHEEZING: 1
RHINORRHEA: 0
ABDOMINAL PAIN: 0
SORE THROAT: 0

## 2024-04-22 NOTE — PROGRESS NOTES
[facility] PRIMARY CARE          ASSESSMENT/PLAN     Jony Mcallister is a 54 y.o. male who presents with:  Chief Complaint   Patient presents with    Respiratory Distress     Reports respiratory symptoms chest congestion worsening over the last several days.  Symptoms first began 2 weeks ago as upper respiratory cold.  Symptoms began to improve prior to worsening.  Reports wheezing and evening mild shortness of breath.  Cough sinus congestion with postnasal drip.  Patient has history of thyroid cancer with metastasis to lungs.        1. Bronchitis  -     azithromycin (ZITHROMAX) 250 MG tablet; 500mg on day 1 followed by 250mg on days 2 - 5, Disp-6 tablet, R-0Normal  -     predniSONE (DELTASONE) 20 MG tablet; Take 1 tablet by mouth daily for 5 days, Disp-5 tablet, R-0Normal        PATIENT EDUCATION:    Patient discharge instructions bronchitis         PATIENT REFERRED TO:    Return if symptoms worsen or fail to improve.    DISCHARGE MEDICATIONS:  New Prescriptions    AZITHROMYCIN (ZITHROMAX) 250 MG TABLET    500mg on day 1 followed by 250mg on days 2 - 5    PREDNISONE (DELTASONE) 20 MG TABLET    Take 1 tablet by mouth daily for 5 days     Cannot display discharge medications since this is not an admission.       Fabiano Daly, APRN - CNP      SUBJECTIVE/REVIEW OF SYSTEMS     Review of Systems   Constitutional:  Positive for chills. Negative for fatigue and fever.   HENT:  Negative for congestion, ear pain, rhinorrhea, sinus pressure and sore throat.    Eyes: Negative.    Respiratory:  Positive for cough and wheezing. Negative for shortness of breath.    Cardiovascular: Negative.    Gastrointestinal:  Negative for abdominal pain.   Endocrine: Negative.    Musculoskeletal:  Negative for myalgias.   Skin:  Negative for rash.   Neurological:  Negative for weakness and light-headedness.   Hematological:  Negative for adenopathy.   Psychiatric/Behavioral: Negative.         OBJECTIVE/PHYSICAL EXAM     Physical

## 2024-04-30 DIAGNOSIS — J40 BRONCHITIS: Primary | ICD-10-CM

## 2024-04-30 RX ORDER — DOXYCYCLINE HYCLATE 100 MG
100 TABLET ORAL 2 TIMES DAILY
Qty: 14 TABLET | Refills: 0 | Status: SHIPPED | OUTPATIENT
Start: 2024-04-30 | End: 2024-05-07

## 2024-07-06 ENCOUNTER — PATIENT MESSAGE (OUTPATIENT)
Dept: FAMILY MEDICINE CLINIC | Age: 54
End: 2024-07-06

## 2024-07-06 DIAGNOSIS — N52.9 ERECTILE DYSFUNCTION, UNSPECIFIED ERECTILE DYSFUNCTION TYPE: ICD-10-CM

## 2024-07-06 NOTE — TELEPHONE ENCOUNTER
Pt is requesting medication refill. Please approve or deny this request.    Rx requested:  Requested Prescriptions     Pending Prescriptions Disp Refills    tadalafil (CIALIS) 20 MG tablet 10 tablet 0     Sig: Take 1 tablet by mouth daily as needed for Erectile Dysfunction         Last Office Visit:   3/28/2024      Next Visit Date:  Future Appointments   Date Time Provider Department Center   7/22/2024  1:00 PM [facility]

## 2024-07-06 NOTE — TELEPHONE ENCOUNTER
From: Jony Mcallister  To: Dr. Minor Sim  Sent: 7/6/2024 10:17 AM EDT  Subject: Refill     Please submit a full prescription for Tadalafil 20 MG to [facility]. This medication and dosage has been effective.

## 2024-07-08 RX ORDER — TADALAFIL 20 MG/1
20 TABLET ORAL DAILY PRN
Qty: 10 TABLET | Refills: 2 | Status: SHIPPED | OUTPATIENT
Start: 2024-07-08

## 2024-07-22 ENCOUNTER — HOSPITAL ENCOUNTER (OUTPATIENT)
Dept: CT IMAGING | Age: 54
Discharge: HOME | End: 2024-07-24
Payer: COMMERCIAL

## 2024-07-22 DIAGNOSIS — C73 PAPILLARY CARCINOMA OF THYROID (HCC): ICD-10-CM

## 2024-07-22 PROCEDURE — 71250 CT THORAX DX C-: CPT

## 2024-07-23 ENCOUNTER — HOSPITAL ENCOUNTER (OUTPATIENT)
Dept: LAB | Age: 54
Discharge: HOME | End: 2024-07-23
Payer: COMMERCIAL

## 2024-07-23 LAB — TSH SERPL-MCNC: 0.01 UIU/ML (ref 0.44–3.86)

## 2024-07-23 PROCEDURE — 36415 COLL VENOUS BLD VENIPUNCTURE: CPT

## 2024-07-23 PROCEDURE — 86800 THYROGLOBULIN ANTIBODY: CPT

## 2024-07-23 PROCEDURE — 84432 ASSAY OF THYROGLOBULIN: CPT

## 2024-07-23 PROCEDURE — 84443 ASSAY THYROID STIM HORMONE: CPT

## 2024-07-26 LAB — THYROGLOB IGG SER-ACNC: >4794 IU/ML (ref 0–40)

## 2024-07-28 LAB — THYROGLOB SERPL-MCNC: <0.5 NG/ML (ref 1.3–31.8)

## 2024-08-01 ENCOUNTER — TRANSCRIBE ORDERS (OUTPATIENT)
Dept: ADMINISTRATIVE | Age: 54
End: 2024-08-01

## 2024-08-01 DIAGNOSIS — C73 PAPILLARY CARCINOMA OF THYROID (HCC): Primary | ICD-10-CM

## 2024-08-21 ENCOUNTER — HOSPITAL ENCOUNTER (OUTPATIENT)
Dept: CT IMAGING | Age: 54
Discharge: HOME | End: 2024-08-23
Payer: COMMERCIAL

## 2024-08-21 DIAGNOSIS — C73 PAPILLARY CARCINOMA OF THYROID (HCC): ICD-10-CM

## 2024-08-21 PROCEDURE — A9609 HC RX DIAGNOSTIC RADIOPHARMACEUTICAL: HCPCS | Performed by: INTERNAL MEDICINE

## 2024-08-21 PROCEDURE — 78815 PET IMAGE W/CT SKULL-THIGH: CPT

## 2024-08-21 PROCEDURE — 3430000000 HC RX DIAGNOSTIC RADIOPHARMACEUTICAL: Performed by: INTERNAL MEDICINE

## 2024-08-21 RX ORDER — FLUDEOXYGLUCOSE F 18 200 MCI/ML
13.9 INJECTION, SOLUTION INTRAVENOUS
Status: COMPLETED | OUTPATIENT
Start: 2024-08-21 | End: 2024-08-21

## 2024-08-21 RX ADMIN — FLUDEOXYGLUCOSE F 18 13.9 MILLICURIE: 200 INJECTION, SOLUTION INTRAVENOUS at 12:38

## 2024-08-30 ENCOUNTER — HOSPITAL ENCOUNTER (OUTPATIENT)
Dept: LAB | Age: 54
Discharge: HOME | End: 2024-08-30
Payer: COMMERCIAL

## 2024-08-30 LAB — TSH SERPL-MCNC: <0.01 UIU/ML (ref 0.44–3.86)

## 2024-08-30 PROCEDURE — 84443 ASSAY THYROID STIM HORMONE: CPT

## 2024-08-30 PROCEDURE — 84432 ASSAY OF THYROGLOBULIN: CPT

## 2024-08-30 PROCEDURE — 36415 COLL VENOUS BLD VENIPUNCTURE: CPT

## 2024-08-30 PROCEDURE — 86800 THYROGLOBULIN ANTIBODY: CPT

## 2024-09-01 LAB — THYROGLOB IGG SER-ACNC: >4794 IU/ML (ref 0–40)

## 2024-09-03 LAB — THYROGLOB SERPL-MCNC: <0.5 NG/ML (ref 1.3–31.8)

## 2024-11-15 ENCOUNTER — HOSPITAL ENCOUNTER (OUTPATIENT)
Dept: LAB | Age: 54
Discharge: HOME | End: 2024-11-15
Payer: COMMERCIAL

## 2024-11-15 LAB — TSH SERPL-MCNC: <0.01 UIU/ML (ref 0.44–3.86)

## 2024-11-15 PROCEDURE — 36415 COLL VENOUS BLD VENIPUNCTURE: CPT

## 2024-11-15 PROCEDURE — 86800 THYROGLOBULIN ANTIBODY: CPT

## 2024-11-15 PROCEDURE — 84432 ASSAY OF THYROGLOBULIN: CPT

## 2024-11-15 PROCEDURE — 84443 ASSAY THYROID STIM HORMONE: CPT

## 2024-11-17 LAB — THYROGLOB IGG SER-ACNC: 4645 IU/ML (ref 0–40)

## 2024-11-18 ENCOUNTER — HOSPITAL ENCOUNTER (OUTPATIENT)
Dept: CT IMAGING | Age: 54
Discharge: HOME | End: 2024-11-20
Payer: COMMERCIAL

## 2024-11-18 DIAGNOSIS — C73 PAPILLARY CARCINOMA OF THYROID (HCC): ICD-10-CM

## 2024-11-18 PROCEDURE — 71250 CT THORAX DX C-: CPT

## 2024-11-23 DIAGNOSIS — I10 PRIMARY HYPERTENSION: ICD-10-CM

## 2024-11-25 ENCOUNTER — HOSPITAL ENCOUNTER (OUTPATIENT)
Dept: LAB | Age: 54
Discharge: HOME | End: 2024-11-25
Payer: COMMERCIAL

## 2024-11-25 DIAGNOSIS — E78.1 HYPERTRIGLYCERIDEMIA: ICD-10-CM

## 2024-11-25 DIAGNOSIS — Z12.5 SCREENING FOR PROSTATE CANCER: ICD-10-CM

## 2024-11-25 DIAGNOSIS — E03.9 ACQUIRED HYPOTHYROIDISM: Chronic | ICD-10-CM

## 2024-11-25 DIAGNOSIS — C73 PAPILLARY CARCINOMA OF THYROID (HCC): ICD-10-CM

## 2024-11-25 DIAGNOSIS — D64.9 NORMOCYTIC ANEMIA: ICD-10-CM

## 2024-11-25 LAB
ALBUMIN SERPL-MCNC: 4.3 G/DL (ref 3.5–4.6)
ALP SERPL-CCNC: 71 U/L (ref 35–104)
ALT SERPL-CCNC: 20 U/L (ref 0–41)
ANION GAP SERPL CALCULATED.3IONS-SCNC: 10 MEQ/L (ref 9–15)
AST SERPL-CCNC: 26 U/L (ref 0–40)
BASOPHILS # BLD: 0 K/UL (ref 0–0.2)
BASOPHILS NFR BLD: 0.6 %
BILIRUB SERPL-MCNC: 0.5 MG/DL (ref 0.2–0.7)
BUN SERPL-MCNC: 17 MG/DL (ref 6–20)
CALCIUM SERPL-MCNC: 9.4 MG/DL (ref 8.5–9.9)
CHLORIDE SERPL-SCNC: 106 MEQ/L (ref 95–107)
CHOLEST SERPL-MCNC: 135 MG/DL (ref 0–199)
CO2 SERPL-SCNC: 27 MEQ/L (ref 20–31)
CREAT SERPL-MCNC: 1.29 MG/DL (ref 0.7–1.2)
EOSINOPHIL # BLD: 0.1 K/UL (ref 0–0.7)
EOSINOPHIL NFR BLD: 1.5 %
ERYTHROCYTE [DISTWIDTH] IN BLOOD BY AUTOMATED COUNT: 13.3 % (ref 11.5–14.5)
GLOBULIN SER CALC-MCNC: 3.2 G/DL (ref 2.3–3.5)
GLUCOSE SERPL-MCNC: 104 MG/DL (ref 70–99)
HCT VFR BLD AUTO: 39 % (ref 42–52)
HDLC SERPL-MCNC: 41 MG/DL (ref 40–59)
HGB BLD-MCNC: 13.5 G/DL (ref 14–18)
LDLC SERPL CALC-MCNC: 70 MG/DL (ref 0–129)
LYMPHOCYTES # BLD: 1.3 K/UL (ref 1–4.8)
LYMPHOCYTES NFR BLD: 27 %
MCH RBC QN AUTO: 31.4 PG (ref 27–31.3)
MCHC RBC AUTO-ENTMCNC: 34.6 % (ref 33–37)
MCV RBC AUTO: 90.7 FL (ref 79–92.2)
MONOCYTES # BLD: 0.5 K/UL (ref 0.2–0.8)
MONOCYTES NFR BLD: 11.3 %
NEUTROPHILS # BLD: 2.8 K/UL (ref 1.4–6.5)
NEUTS SEG NFR BLD: 59 %
PLATELET # BLD AUTO: 244 K/UL (ref 130–400)
POTASSIUM SERPL-SCNC: 4.5 MEQ/L (ref 3.4–4.9)
PROT SERPL-MCNC: 7.5 G/DL (ref 6.3–8)
PSA SERPL-MCNC: 0.65 NG/ML (ref 0–4)
RBC # BLD AUTO: 4.3 M/UL (ref 4.7–6.1)
SODIUM SERPL-SCNC: 143 MEQ/L (ref 135–144)
T4 FREE SERPL-MCNC: 1.97 NG/DL (ref 0.84–1.68)
TRIGL SERPL-MCNC: 120 MG/DL (ref 0–150)
TSH SERPL-MCNC: 0.01 UIU/ML (ref 0.44–3.86)
WBC # BLD AUTO: 4.7 K/UL (ref 4.8–10.8)

## 2024-11-25 PROCEDURE — 84153 ASSAY OF PSA TOTAL: CPT

## 2024-11-25 PROCEDURE — 84439 ASSAY OF FREE THYROXINE: CPT

## 2024-11-25 PROCEDURE — 80061 LIPID PANEL: CPT

## 2024-11-25 PROCEDURE — 80053 COMPREHEN METABOLIC PANEL: CPT

## 2024-11-25 PROCEDURE — 84443 ASSAY THYROID STIM HORMONE: CPT

## 2024-11-25 PROCEDURE — 36415 COLL VENOUS BLD VENIPUNCTURE: CPT

## 2024-11-25 PROCEDURE — 85025 COMPLETE CBC W/AUTO DIFF WBC: CPT

## 2024-11-25 RX ORDER — LOSARTAN POTASSIUM AND HYDROCHLOROTHIAZIDE 12.5; 5 MG/1; MG/1
1 TABLET ORAL DAILY
Qty: 90 TABLET | Refills: 1 | Status: SHIPPED | OUTPATIENT
Start: 2024-11-25

## 2024-11-25 NOTE — TELEPHONE ENCOUNTER
Comments:     Last Office Visit (last PCP visit):   3/28/2024    Next Visit Date:  No future appointments.    **If hasn't been seen in over a year OR hasn't followed up according to last diabetes/ADHD visit, make appointment for patient before sending refill to provider.    Rx requested:  Requested Prescriptions     Pending Prescriptions Disp Refills    losartan-hydroCHLOROthiazide (HYZAAR) 50-12.5 MG per tablet [Pharmacy Med Name: LOSARTAN POTASSIUM-HCT 50-12.5 TABS] 90 tablet 1     Sig: TAKE ONE TABLET BY MOUTH ONCE A DAY

## 2024-11-26 NOTE — RESULT ENCOUNTER NOTE
Stable low TSH 0.011 with high free T4 1.97, stable low WBC 4.7, stable low Hgb 13.5  [employer] message sent to patient

## 2024-12-03 ENCOUNTER — OFFICE VISIT (OUTPATIENT)
Dept: FAMILY MEDICINE CLINIC | Age: 54
End: 2024-12-03
Payer: COMMERCIAL

## 2024-12-03 VITALS
OXYGEN SATURATION: 97 % | TEMPERATURE: 97.3 F | SYSTOLIC BLOOD PRESSURE: 122 MMHG | WEIGHT: 185 LBS | HEART RATE: 72 BPM | HEIGHT: 72 IN | BODY MASS INDEX: 25.06 KG/M2 | DIASTOLIC BLOOD PRESSURE: 74 MMHG

## 2024-12-03 DIAGNOSIS — E78.1 HYPERTRIGLYCERIDEMIA: ICD-10-CM

## 2024-12-03 DIAGNOSIS — I10 PRIMARY HYPERTENSION: Primary | ICD-10-CM

## 2024-12-03 DIAGNOSIS — C77.0 SECONDARY AND UNSPECIFIED MALIGNANT NEOPLASM OF LYMPH NODES OF HEAD, FACE AND NECK (HCC): ICD-10-CM

## 2024-12-03 DIAGNOSIS — C78.02 SECONDARY MALIGNANT NEOPLASM OF LEFT LUNG (HCC): ICD-10-CM

## 2024-12-03 DIAGNOSIS — D64.9 NORMOCYTIC ANEMIA: ICD-10-CM

## 2024-12-03 DIAGNOSIS — C73 PAPILLARY CARCINOMA OF THYROID (HCC): ICD-10-CM

## 2024-12-03 DIAGNOSIS — E03.9 ACQUIRED HYPOTHYROIDISM: Chronic | ICD-10-CM

## 2024-12-03 DIAGNOSIS — N52.9 ERECTILE DYSFUNCTION, UNSPECIFIED ERECTILE DYSFUNCTION TYPE: ICD-10-CM

## 2024-12-03 DIAGNOSIS — Z23 NEED FOR VACCINATION: ICD-10-CM

## 2024-12-03 PROCEDURE — 3074F SYST BP LT 130 MM HG: CPT | Performed by: FAMILY MEDICINE

## 2024-12-03 PROCEDURE — 99214 OFFICE O/P EST MOD 30 MIN: CPT | Performed by: FAMILY MEDICINE

## 2024-12-03 PROCEDURE — 3078F DIAST BP <80 MM HG: CPT | Performed by: FAMILY MEDICINE

## 2024-12-03 RX ORDER — TADALAFIL 20 MG/1
20 TABLET ORAL DAILY PRN
Qty: 10 TABLET | Refills: 2 | Status: SHIPPED | OUTPATIENT
Start: 2024-12-03

## 2024-12-03 RX ORDER — LOSARTAN POTASSIUM AND HYDROCHLOROTHIAZIDE 12.5; 5 MG/1; MG/1
1 TABLET ORAL DAILY
Qty: 90 TABLET | Refills: 1 | Status: SHIPPED | OUTPATIENT
Start: 2024-12-03

## 2024-12-03 ASSESSMENT — ENCOUNTER SYMPTOMS
CONSTIPATION: 0
DIARRHEA: 0
VOMITING: 0
CHEST TIGHTNESS: 0
COUGH: 0
SHORTNESS OF BREATH: 0
ANAL BLEEDING: 0
NAUSEA: 0
ABDOMINAL PAIN: 0
BLOOD IN STOOL: 0
WHEEZING: 0

## 2024-12-03 NOTE — ASSESSMENT & PLAN NOTE
Medication management per endocrinology at [facility].  Goal is for TSH <0.1 per specialist. We will continue to follow labwork over time.  Patient instructed to continue with current dose of levothyroxine as recommended by endocrinology office and contact the specialist should he develop and worsening temperature intolerance, persistent sweats, palpitations/tachycardia, diarrhea, change in appetite or unintentional weight changes.

## 2024-12-03 NOTE — PROGRESS NOTES
Jony Mcallister (: 1970) is a 54 y.o. male, Established patient, who presents today for:    Chief Complaint   Patient presents with    Follow-up     No concerns          Assessment & Plan     1. Primary hypertension  Assessment & Plan:  Blood pressure within normal limits today in the office.  Patient instructed to continue with current dose of losartan-hydrochlorothiazide.    Orders:  -     losartan-hydroCHLOROthiazide (HYZAAR) 50-12.5 MG per tablet; Take 1 tablet by mouth daily, Disp-90 tablet, R-1Normal  2. Hypertriglyceridemia  Assessment & Plan:  Most recent lipid panel at goal control.  Patient instructed to continue with Lovaza at current dosing.  3. Secondary malignant neoplasm of left lung (HCC)  Assessment & Plan:   Monitored by specialist- no acute findings meriting change in the plan  4. Normocytic anemia  Assessment & Plan:  Most recent blood counts with stable hemoglobin level.  No reported signs of bleeding.  We will continue to follow over time.    5. Acquired hypothyroidism  Assessment & Plan:  Medication management per endocrinology at [facility].  Goal is for TSH <0.1 per specialist. We will continue to follow labwork over time.  Patient instructed to continue with current dose of levothyroxine as recommended by endocrinology office and contact the specialist should he develop and worsening temperature intolerance, persistent sweats, palpitations/tachycardia, diarrhea, change in appetite or unintentional weight changes.   6. Papillary carcinoma of thyroid (HCC)  Assessment & Plan:   Monitored by specialist- no acute findings meriting change in the plan  7. Secondary and unspecified malignant neoplasm of lymph nodes of head, face and neck (HCC)  Assessment & Plan:   Monitored by specialist- no acute findings meriting change in the plan  8. Erectile dysfunction, unspecified erectile dysfunction type  -     tadalafil (CIALIS) 20 MG tablet; Take 1 tablet by mouth daily as needed for

## 2024-12-03 NOTE — ASSESSMENT & PLAN NOTE
Most recent blood counts with stable hemoglobin level.  No reported signs of bleeding.  We will continue to follow over time.

## 2024-12-03 NOTE — ASSESSMENT & PLAN NOTE
Blood pressure within normal limits today in the office.  Patient instructed to continue with current dose of losartan-hydrochlorothiazide.

## 2025-01-04 LAB — THYROGLOB SERPL-MCNC: <0.5 NG/ML (ref 1.3–31.8)

## 2025-01-22 ENCOUNTER — TELEPHONE (OUTPATIENT)
Dept: FAMILY MEDICINE CLINIC | Age: 55
End: 2025-01-22

## 2025-01-22 ENCOUNTER — OFFICE VISIT (OUTPATIENT)
Dept: FAMILY MEDICINE CLINIC | Age: 55
End: 2025-01-22
Payer: COMMERCIAL

## 2025-01-22 VITALS
SYSTOLIC BLOOD PRESSURE: 102 MMHG | HEIGHT: 72 IN | HEART RATE: 96 BPM | TEMPERATURE: 97.6 F | OXYGEN SATURATION: 100 % | BODY MASS INDEX: 25.06 KG/M2 | DIASTOLIC BLOOD PRESSURE: 68 MMHG | WEIGHT: 185 LBS

## 2025-01-22 DIAGNOSIS — J06.9 UPPER RESPIRATORY INFECTION, ACUTE: Primary | ICD-10-CM

## 2025-01-22 PROCEDURE — 3074F SYST BP LT 130 MM HG: CPT | Performed by: NURSE PRACTITIONER

## 2025-01-22 PROCEDURE — 3078F DIAST BP <80 MM HG: CPT | Performed by: NURSE PRACTITIONER

## 2025-01-22 PROCEDURE — 99213 OFFICE O/P EST LOW 20 MIN: CPT | Performed by: NURSE PRACTITIONER

## 2025-01-22 RX ORDER — PREDNISONE 20 MG/1
20 TABLET ORAL 2 TIMES DAILY
Qty: 10 TABLET | Refills: 1 | Status: SHIPPED | OUTPATIENT
Start: 2025-01-22 | End: 2025-02-01

## 2025-01-22 SDOH — ECONOMIC STABILITY: FOOD INSECURITY: WITHIN THE PAST 12 MONTHS, THE FOOD YOU BOUGHT JUST DIDN'T LAST AND YOU DIDN'T HAVE MONEY TO GET MORE.: NEVER TRUE

## 2025-01-22 SDOH — ECONOMIC STABILITY: FOOD INSECURITY: WITHIN THE PAST 12 MONTHS, YOU WORRIED THAT YOUR FOOD WOULD RUN OUT BEFORE YOU GOT MONEY TO BUY MORE.: NEVER TRUE

## 2025-01-22 ASSESSMENT — PATIENT HEALTH QUESTIONNAIRE - PHQ9
2. FEELING DOWN, DEPRESSED OR HOPELESS: NOT AT ALL
SUM OF ALL RESPONSES TO PHQ QUESTIONS 1-9: 0
SUM OF ALL RESPONSES TO PHQ QUESTIONS 1-9: 0
SUM OF ALL RESPONSES TO PHQ9 QUESTIONS 1 & 2: 0
SUM OF ALL RESPONSES TO PHQ QUESTIONS 1-9: 0
1. LITTLE INTEREST OR PLEASURE IN DOING THINGS: NOT AT ALL
SUM OF ALL RESPONSES TO PHQ QUESTIONS 1-9: 0

## 2025-01-22 ASSESSMENT — ENCOUNTER SYMPTOMS
CHEST TIGHTNESS: 1
WHEEZING: 1
SINUS PAIN: 0
COLOR CHANGE: 0
VOICE CHANGE: 1
EYE REDNESS: 0
COUGH: 1
ABDOMINAL PAIN: 0
SORE THROAT: 1
SHORTNESS OF BREATH: 1
EYE DISCHARGE: 0

## 2025-01-22 NOTE — PROGRESS NOTES
Jony Mcallister (: 1970) is a 54 y.o. male, Established patient, who presents today for:    Chief Complaint   Patient presents with    Congestion     2 weeks, upper respiratory issues, thought he started to get better over the weekend but has had no change since then         Subjective     HPI:  HPI   Patient presents with upper respiratory infection symptoms x 2 weeks  Pt with fever, chills, cough, yellow phlegm  He feels shortness of breath and chest tightness with occasional wheezing  He reports history of Thyroid cancer that has been eradicated, malignant lung nodules remain and he sees pulmonology/oncology for close monitoring.  He reports he has a CT and MRI of the chest every 6 months most recently and this was done 2024  His symptoms currently include Cough, chest tightness, SOB, wheezing  He has been taking over-the-counter cough suppressants with some relief of cough but the chest tightness and shortness of breath is not improving.  He did test for Covid and Influenza and both were negative        Review of Systems   Constitutional:  Positive for chills and fever (As high as 102). Negative for unexpected weight change.   HENT:  Positive for congestion, postnasal drip, sore throat (Due to postnasal drainage) and voice change (This is chronic due to thyroid total resection status post cancer treatment.  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 Negative for ear pain and sinus pain.    Eyes:  Negative for discharge, redness and visual disturbance.   Respiratory:  Positive for cough (With yellow sputum, productive), chest tightness, shortness of breath and wheezing.    Cardiovascular:  Negative for chest pain, palpitations and leg swelling.   Gastrointestinal:  Negative for abdominal pain.   Skin:  Negative for color change and rash.   Neurological:  Negative for headaches.   Hematological:  Negative for adenopathy.   Psychiatric/Behavioral:  Negative for sleep

## 2025-01-22 NOTE — TELEPHONE ENCOUNTER
[facility] in [place] calling to get clarification regarding predniSONE (DELTASONE) 20 MG tablet. Directions read \"Take 1 tablet by mouth 2 times daily for 10 days\" but dispense quantity was 10 tablets. They are wondering if it meant to say \"Take 1 tablet by mouth 2 times daily for 5 days\". Please advise.    [facility] Ph: [phone]

## 2025-02-19 ENCOUNTER — OFFICE VISIT (OUTPATIENT)
Dept: FAMILY MEDICINE CLINIC | Age: 55
End: 2025-02-19

## 2025-02-19 VITALS
DIASTOLIC BLOOD PRESSURE: 68 MMHG | HEART RATE: 80 BPM | TEMPERATURE: 97.5 F | OXYGEN SATURATION: 97 % | SYSTOLIC BLOOD PRESSURE: 106 MMHG

## 2025-02-19 DIAGNOSIS — J02.9 SORE THROAT: Primary | ICD-10-CM

## 2025-02-19 LAB
INFLUENZA A ANTIBODY: NORMAL
INFLUENZA B ANTIBODY: NORMAL
S PYO AG THROAT QL: NORMAL

## 2025-02-19 ASSESSMENT — ENCOUNTER SYMPTOMS
DIARRHEA: 0
NAUSEA: 0
EYE REDNESS: 0
SHORTNESS OF BREATH: 0
COLOR CHANGE: 0
ABDOMINAL PAIN: 0
COUGH: 0
CONSTIPATION: 0
VOMITING: 0
EYE DISCHARGE: 0
SORE THROAT: 1
CHEST TIGHTNESS: 0
WHEEZING: 0

## 2025-02-19 NOTE — PROGRESS NOTES
Jony Mcallister (: 1970) is a 54 y.o. male, Established patient, who presents today for:    Chief Complaint   Patient presents with    Cold Symptoms     Congestion, in the sinus area, sore throat, when swallowing he has pain in his right eat          Subjective     HPI:    Patient presents for sinus and nasal congestion, sore throat and pain that is radiating up into his right ear  Pt went on a cruise a couple weeks ago and most people came back with influenza  Negative Covid, he tested at home  His sore throat began  , and he reports it hurts to swallow, right side worse and pain up into right ear  Sweats and fever 4 days ago, nothing recent  Throat is bothering him the most at this time  He has been using Tylenol for discomfort          Review of Systems   Constitutional:  Positive for chills and fever. Negative for unexpected weight change.   HENT:  Positive for congestion, postnasal drip and sore throat. Negative for ear pain.    Eyes:  Negative for discharge and redness.   Respiratory:  Negative for cough, chest tightness, shortness of breath and wheezing.    Cardiovascular:  Negative for chest pain, palpitations and leg swelling.   Gastrointestinal:  Negative for abdominal pain, constipation, diarrhea, nausea and vomiting.   Musculoskeletal:  Negative for arthralgias and myalgias.   Skin:  Negative for color change and rash.   Allergic/Immunologic: Negative for environmental allergies.   Neurological:  Negative for dizziness, weakness, light-headedness, numbness and headaches.   Hematological:  Negative for adenopathy.        Past Medical History:   Diagnosis Date    Acquired hypothyroidism 2017    meds > 10 yrs    Anxiety     Benign non-nodular prostatic hyperplasia without lower urinary tract symptoms 2016    Central retinal vein occlusion of right eye (HCC) 2022    Chronic bilateral low back pain 2019    History of 2019 novel coronavirus disease (COVID-19)

## 2025-02-21 ENCOUNTER — PATIENT MESSAGE (OUTPATIENT)
Dept: FAMILY MEDICINE CLINIC | Age: 55
End: 2025-02-21

## 2025-02-21 DIAGNOSIS — J01.10 ACUTE NON-RECURRENT FRONTAL SINUSITIS: Primary | ICD-10-CM

## 2025-02-21 NOTE — PROGRESS NOTES
Patient stopped by the office this morning and reports his symptoms are persisting  He feels he may need an antibiotic to cover his symptoms as they are not improving  Will send in Augmentin

## 2025-03-10 ENCOUNTER — TRANSCRIBE ORDERS (OUTPATIENT)
Dept: ADMINISTRATIVE | Age: 55
End: 2025-03-10

## 2025-03-10 DIAGNOSIS — C73 PAPILLARY CARCINOMA OF THYROID (HCC): Primary | ICD-10-CM

## 2025-03-14 ENCOUNTER — PATIENT MESSAGE (OUTPATIENT)
Dept: FAMILY MEDICINE CLINIC | Age: 55
End: 2025-03-14

## 2025-03-14 DIAGNOSIS — J06.9 UPPER RESPIRATORY INFECTION, ACUTE: Primary | ICD-10-CM

## 2025-03-14 RX ORDER — DOXYCYCLINE HYCLATE 100 MG
100 TABLET ORAL 2 TIMES DAILY
Qty: 20 TABLET | Refills: 0 | Status: SHIPPED | OUTPATIENT
Start: 2025-03-14 | End: 2025-03-24

## 2025-03-14 RX ORDER — DOXYCYCLINE HYCLATE 100 MG
100 TABLET ORAL 2 TIMES DAILY
Qty: 20 TABLET | Refills: 0 | Status: SHIPPED | OUTPATIENT
Start: 2025-03-14 | End: 2025-03-14 | Stop reason: SDUPTHER

## 2025-05-05 ENCOUNTER — OFFICE VISIT (OUTPATIENT)
Dept: FAMILY MEDICINE CLINIC | Age: 55
End: 2025-05-05
Payer: COMMERCIAL

## 2025-05-05 VITALS
WEIGHT: 185 LBS | SYSTOLIC BLOOD PRESSURE: 114 MMHG | TEMPERATURE: 97.5 F | BODY MASS INDEX: 25.09 KG/M2 | HEART RATE: 97 BPM | OXYGEN SATURATION: 98 % | DIASTOLIC BLOOD PRESSURE: 76 MMHG

## 2025-05-05 DIAGNOSIS — J06.9 UPPER RESPIRATORY INFECTION, ACUTE: Primary | ICD-10-CM

## 2025-05-05 PROCEDURE — 3078F DIAST BP <80 MM HG: CPT | Performed by: NURSE PRACTITIONER

## 2025-05-05 PROCEDURE — 99213 OFFICE O/P EST LOW 20 MIN: CPT | Performed by: NURSE PRACTITIONER

## 2025-05-05 PROCEDURE — 3074F SYST BP LT 130 MM HG: CPT | Performed by: NURSE PRACTITIONER

## 2025-05-05 RX ORDER — PREDNISONE 20 MG/1
TABLET ORAL
Qty: 21 TABLET | Refills: 0 | Status: SHIPPED | OUTPATIENT
Start: 2025-05-05 | End: 2025-05-14

## 2025-05-05 RX ORDER — DOXYCYCLINE HYCLATE 100 MG
100 TABLET ORAL 2 TIMES DAILY
Qty: 28 TABLET | Refills: 0 | Status: SHIPPED | OUTPATIENT
Start: 2025-05-05 | End: 2025-05-19

## 2025-05-05 ASSESSMENT — ENCOUNTER SYMPTOMS
SORE THROAT: 1
VOICE CHANGE: 1
COLOR CHANGE: 0
CHEST TIGHTNESS: 1
WHEEZING: 0
SINUS PRESSURE: 1
EYE REDNESS: 0
TROUBLE SWALLOWING: 1
EYE DISCHARGE: 0
SHORTNESS OF BREATH: 0
COUGH: 1
SINUS PAIN: 1

## 2025-05-05 NOTE — PROGRESS NOTES
Jony Mcallister (: 1970) is a 55 y.o. male, Established patient, who presents today for:    Chief Complaint   Patient presents with   • Cough     Congestion , drainage, x 2 weeks,          Subjective     HPI:  History of Present Illness  The patient is a 55-year-old male who presents today for upper respiratory complaints of hoarseness, congestion, and drainage that have persisted for a couple of weeks.    He was last seen in 2025, at which time he was prescribed Augmentin. He subsequently reported via MyChart that Doxycycline had been effective in the past, leading to a 10-day course of this medication on 2025. His symptoms improved but recurred approximately 1.5 weeks ago. He describes experiencing chest tightness, coughing, and drainage. 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 He experiences partial airway collapse when his prosthetic dries out, typically at night, which manifests as stridor. He manages this by sipping water until the airway reopens. He suspects aspiration due to the presence of yellow mucus, although in minimal amounts.    He reports difficulty swallowing, a sore throat, and ear pressure, although he does not believe he has a sinus infection. He also reports sensitivity when cleaning his ear. He has not noticed any lymph node enlargement. He experiences night sweats and intermittent chills. He reports significant chest tightness and uses an inhaler for relief. He is scheduled for a PET scan and CT scan at the end of the month, followed by an oncology appointment in early 2025. He is considering another round of Doxycycline, as recommended by his oncologist if his symptoms do not resolve within a week. He also requests a steroid taper.    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            Results         Review of Systems   Constitutional:  Positive for chills and

## 2025-05-23 NOTE — ANESTHESIA POSTPROCEDURE EVALUATION
Department of Anesthesiology  Postprocedure Note    Patient: Noel Coreas  MRN: [id number]  YOB: 1970  Date of evaluation: 3/1/2022  Time:  12:00 PM     Procedure Summary     Date: 03/01/22 Room / Location: [address]    Anesthesia Start: 1053 Anesthesia Stop: 1200    Procedures:       RIGHT NIGEL VOCAL CORD INJECTION. (Right Throat)      EXCISION OF RIGHT AXILLARY MASS (Right Arm Upper) Diagnosis: (RIGHT NIGEL VOCAL CORD PARALYSIS)    Surgeons: Lalit Damian MD Responsible Provider: Jacobo Kenyon DO    Anesthesia Type: general ASA Status: 2          Anesthesia Type: general    Rogelio Phase I:      Rogelio Phase II:      Last vitals: Reviewed and per EMR flowsheets.        Anesthesia Post Evaluation    Patient location during evaluation: PACU  Patient participation: complete - patient participated  Level of consciousness: agitated and awake and alert  Pain score: 0  Airway patency: patent  Nausea & Vomiting: no nausea and no vomiting  Complications: no  Cardiovascular status: hemodynamically stable and blood pressure returned to baseline  Respiratory status: acceptable, spontaneous ventilation, nonlabored ventilation, face mask and airway suctioned  Hydration status: euvolemic
Ambulatory

## 2025-05-26 DIAGNOSIS — I10 PRIMARY HYPERTENSION: ICD-10-CM

## 2025-05-27 NOTE — TELEPHONE ENCOUNTER
Comments:     Last Office Visit (last PCP visit):   Visit date not found    Next Visit Date:  Future Appointments   Date Time Provider Department Center   5/28/2025  8:00 AM [facility] PET (MOBILE) [facility]   5/28/2025 10:00 AM [facility] ROOM 1 [facility] RAD       **If hasn't been seen in over a year OR hasn't followed up according to last diabetes/ADHD visit, make appointment for patient before sending refill to provider.    Rx requested:  Requested Prescriptions     Pending Prescriptions Disp Refills    losartan-hydroCHLOROthiazide (HYZAAR) 50-12.5 MG per tablet 90 tablet 1     Sig: Take 1 tablet by mouth daily

## 2025-05-28 ENCOUNTER — HOSPITAL ENCOUNTER (OUTPATIENT)
Dept: CT IMAGING | Age: 55
Discharge: HOME | End: 2025-05-30
Payer: COMMERCIAL

## 2025-05-28 DIAGNOSIS — C73 PAPILLARY CARCINOMA OF THYROID (HCC): ICD-10-CM

## 2025-05-28 PROCEDURE — 3430000000 HC RX DIAGNOSTIC RADIOPHARMACEUTICAL: Performed by: STUDENT IN AN ORGANIZED HEALTH CARE EDUCATION/TRAINING PROGRAM

## 2025-05-28 PROCEDURE — A9609 HC RX DIAGNOSTIC RADIOPHARMACEUTICAL: HCPCS | Performed by: STUDENT IN AN ORGANIZED HEALTH CARE EDUCATION/TRAINING PROGRAM

## 2025-05-28 PROCEDURE — 71250 CT THORAX DX C-: CPT

## 2025-05-28 PROCEDURE — 78815 PET IMAGE W/CT SKULL-THIGH: CPT

## 2025-05-28 RX ORDER — LOSARTAN POTASSIUM AND HYDROCHLOROTHIAZIDE 12.5; 5 MG/1; MG/1
1 TABLET ORAL DAILY
Qty: 90 TABLET | Refills: 1 | Status: SHIPPED | OUTPATIENT
Start: 2025-05-28

## 2025-05-28 RX ORDER — FLUDEOXYGLUCOSE F 18 200 MCI/ML
17.6 INJECTION, SOLUTION INTRAVENOUS
Status: COMPLETED | OUTPATIENT
Start: 2025-05-28 | End: 2025-05-28

## 2025-05-28 RX ADMIN — FLUDEOXYGLUCOSE F 18 17.6 MILLICURIE: 200 INJECTION, SOLUTION INTRAVENOUS at 08:45

## 2025-06-10 ENCOUNTER — HOSPITAL ENCOUNTER (OUTPATIENT)
Dept: LAB | Age: 55
Discharge: HOME | End: 2025-06-10
Payer: COMMERCIAL

## 2025-06-10 LAB
ALBUMIN SERPL-MCNC: 4.1 G/DL (ref 3.5–4.6)
ALP SERPL-CCNC: 69 U/L (ref 35–104)
ALT SERPL-CCNC: 16 U/L (ref 0–41)
ANION GAP SERPL CALCULATED.3IONS-SCNC: 9 MEQ/L (ref 9–15)
AST SERPL-CCNC: 24 U/L (ref 0–40)
BASOPHILS # BLD: 0 K/UL (ref 0–0.2)
BASOPHILS NFR BLD: 0.3 %
BILIRUB SERPL-MCNC: 0.6 MG/DL (ref 0.2–0.7)
BUN SERPL-MCNC: 17 MG/DL (ref 6–20)
CALCIUM SERPL-MCNC: 9.3 MG/DL (ref 8.5–9.9)
CHLORIDE SERPL-SCNC: 106 MEQ/L (ref 95–107)
CO2 SERPL-SCNC: 28 MEQ/L (ref 20–31)
CREAT SERPL-MCNC: 1.12 MG/DL (ref 0.7–1.2)
EOSINOPHIL # BLD: 0.1 K/UL (ref 0–0.7)
EOSINOPHIL NFR BLD: 1.3 %
ERYTHROCYTE [DISTWIDTH] IN BLOOD BY AUTOMATED COUNT: 13.8 % (ref 11.5–14.5)
GLOBULIN SER CALC-MCNC: 3.2 G/DL (ref 2.3–3.5)
GLUCOSE SERPL-MCNC: 84 MG/DL (ref 70–99)
HCT VFR BLD AUTO: 35.8 % (ref 42–52)
HGB BLD-MCNC: 12.2 G/DL (ref 14–18)
LDH SERPL-CCNC: 222 U/L (ref 135–225)
LYMPHOCYTES # BLD: 1 K/UL (ref 1–4.8)
LYMPHOCYTES NFR BLD: 16.8 %
MCH RBC QN AUTO: 31.5 PG (ref 27–31.3)
MCHC RBC AUTO-ENTMCNC: 34.1 % (ref 33–37)
MCV RBC AUTO: 92.5 FL (ref 79–92.2)
MONOCYTES # BLD: 0.6 K/UL (ref 0.2–0.8)
MONOCYTES NFR BLD: 9.7 %
NEUTROPHILS # BLD: 4.2 K/UL (ref 1.4–6.5)
NEUTS SEG NFR BLD: 69.8 %
PLATELET # BLD AUTO: 308 K/UL (ref 130–400)
POTASSIUM SERPL-SCNC: 4.1 MEQ/L (ref 3.4–4.9)
PROT SERPL-MCNC: 7.3 G/DL (ref 6.3–8)
RBC # BLD AUTO: 3.87 M/UL (ref 4.7–6.1)
SODIUM SERPL-SCNC: 143 MEQ/L (ref 135–144)
TSH SERPL-MCNC: 0.01 UIU/ML (ref 0.44–3.86)
WBC # BLD AUTO: 6.1 K/UL (ref 4.8–10.8)

## 2025-06-10 PROCEDURE — 83615 LACTATE (LD) (LDH) ENZYME: CPT

## 2025-06-10 PROCEDURE — 36415 COLL VENOUS BLD VENIPUNCTURE: CPT

## 2025-06-10 PROCEDURE — 86800 THYROGLOBULIN ANTIBODY: CPT

## 2025-06-10 PROCEDURE — 84443 ASSAY THYROID STIM HORMONE: CPT

## 2025-06-10 PROCEDURE — 80053 COMPREHEN METABOLIC PANEL: CPT

## 2025-06-10 PROCEDURE — 85025 COMPLETE CBC W/AUTO DIFF WBC: CPT

## 2025-06-10 PROCEDURE — 84432 ASSAY OF THYROGLOBULIN: CPT

## 2025-06-11 LAB — THYROGLOB IGG SER-ACNC: 3201 IU/ML (ref 0–40)

## 2025-06-14 LAB — THYROGLOB SERPL-MCNC: <0.5 NG/ML (ref 1.3–31.8)

## 2025-09-05 DIAGNOSIS — N52.9 ERECTILE DYSFUNCTION, UNSPECIFIED ERECTILE DYSFUNCTION TYPE: ICD-10-CM

## 2025-09-05 RX ORDER — TADALAFIL 20 MG/1
20 TABLET ORAL DAILY PRN
Qty: 10 TABLET | Refills: 2 | Status: SHIPPED | OUTPATIENT
Start: 2025-09-05

## (undated) DEVICE — BANDAGE ADH W2XL4IN NITRL FAB STRP CURAD

## (undated) DEVICE — 3M™ STERI-STRIP™ REINFORCED ADHESIVE SKIN CLOSURES, R1547, 1/2 IN X 4 IN (12 MM X 100 MM), 6 STRIPS/ENVELOPE: Brand: 3M™ STERI-STRIP™

## (undated) DEVICE — LABEL MED MINI W/ MARKER

## (undated) DEVICE — PACK,EENT,TURBAN DRAPE,PK II: Brand: MEDLINE

## (undated) DEVICE — GLOVE ORANGE PI 7 1/2   MSG9075

## (undated) DEVICE — SYRINGE MED 10ML LUERLOCK TIP W/O SFTY DISP

## (undated) DEVICE — SUTURE ETHLN SZ 4-0 L18IN NONABSORBABLE BLK L19MM PS-2 3/8 1667H

## (undated) DEVICE — NEEDLE BX 14GA LAIN 20MM SPEC NOTCH SCALP SHRP SURG STL CUT

## (undated) DEVICE — SINGLE PORT MANIFOLD: Brand: NEPTUNE 2

## (undated) DEVICE — TOWEL,OR,DSP,ST,BLUE,STD,4/PK,20PK/CS: Brand: MEDLINE

## (undated) DEVICE — TELFA NON-ADHERENT ABSORBENT DRESSING: Brand: TELFA

## (undated) DEVICE — CORD,CAUTERY,BIPOLAR,STERILE: Brand: MEDLINE

## (undated) DEVICE — MARKER SURG SKIN GENTIAN VLT REG TIP W/ 6IN RUL

## (undated) DEVICE — COUNTER NDL 40 COUNT HLD 70 FOAM BLK ADH W/ MAG

## (undated) DEVICE — TUBING, SUCTION, 1/4" X 10', STRAIGHT: Brand: MEDLINE

## (undated) DEVICE — SYRINGE IRRIG 60ML SFT PLIABLE BLB EZ TO GRP 1 HND USE W/

## (undated) DEVICE — GAUZE,SPONGE,4"X4",16PLY,XRAY,STRL,LF: Brand: MEDLINE

## (undated) DEVICE — GUARD TEETH AD NYL FOR LARYNSCP POS PROTCT

## (undated) DEVICE — HYPODERMIC SAFETY NEEDLE: Brand: MAGELLAN

## (undated) DEVICE — JELLY,LUBE,STERILE,FLIP TOP,TUBE,2-OZ: Brand: MEDLINE

## (undated) DEVICE — NEEDLE HYPO 25GA L1.5IN BLU POLYPR HUB S STL REG BVL STR

## (undated) DEVICE — 3M™ STERI-STRIP™ REINFORCED ADHESIVE SKIN CLOSURES, R1541, 1/4 IN X 3 IN (6 MM X 75 MM), 3 STRIPS/ENVELOPE: Brand: 3M™ STERI-STRIP™

## (undated) DEVICE — NEPTUNE E-SEP SMOKE EVACUATION PENCIL, COATED, 70MM BLADE, PUSH BUTTON SWITCH: Brand: NEPTUNE E-SEP

## (undated) DEVICE — SUTURE VCRL SZ 4-0 L18IN ABSRB UD L19MM PS-2 3/8 CIR PRIM J496H

## (undated) DEVICE — GOWN,AURORA,NONREINFORCED,LARGE: Brand: MEDLINE

## (undated) DEVICE — PACK,BASIC: Brand: MEDLINE

## (undated) DEVICE — PAD,NON-ADHERENT,3X8,STERILE,LF,1/PK: Brand: MEDLINE

## (undated) DEVICE — SUTURE VCRL SZ 3-0 L27IN ABSRB UD L26MM SH 1/2 CIR J416H

## (undated) DEVICE — CONTAINER,SPECIMEN,OR STERILE,4OZ: Brand: MEDLINE

## (undated) DEVICE — BLADE ES ELASTOMERIC COAT INSUL DURABLE BEND UPTO 90DEG

## (undated) DEVICE — ELECTRODE PT RET AD L9FT HI MOIST COND ADH HYDRGEL CORDED

## (undated) DEVICE — VIAL ACCESS CANNULA,SMART TIP: Brand: MONOJECT

## (undated) DEVICE — APPLICATOR MEDICATED 10.5 CC SOLUTION HI LT ORNG CHLORAPREP

## (undated) DEVICE — INTENDED FOR TISSUE SEPARATION, AND OTHER PROCEDURES THAT REQUIRE A SHARP SURGICAL BLADE TO PUNCTURE OR CUT.: Brand: BARD-PARKER ® CARBON RIB-BACK BLADES

## (undated) DEVICE — SHEET,DRAPE,53X77,STERILE: Brand: MEDLINE

## (undated) DEVICE — CODMAN® SURGICAL PATTIES 3/4" X 3/4" (1.91CM X 1.91CM): Brand: CODMAN®

## (undated) DEVICE — Z DISCONTINUED USE 2272117 DRAPE SURG 3 QTR N INVASIVE 2 LAYR DISP

## (undated) DEVICE — STANDARD HYPODERMIC NEEDLE,POLYPROPYLENE HUB: Brand: MONOJECT